# Patient Record
Sex: MALE | Race: WHITE | NOT HISPANIC OR LATINO | Employment: OTHER | URBAN - METROPOLITAN AREA
[De-identification: names, ages, dates, MRNs, and addresses within clinical notes are randomized per-mention and may not be internally consistent; named-entity substitution may affect disease eponyms.]

---

## 2021-11-20 LAB
LEFT EYE DM RETINOPATHY: NEGATIVE
RIGHT EYE DM RETINOPATHY: NEGATIVE

## 2021-11-30 ENCOUNTER — TELEPHONE (OUTPATIENT)
Dept: INTERNAL MEDICINE | Facility: CLINIC | Age: 46
End: 2021-11-30
Payer: COMMERCIAL

## 2021-12-02 ENCOUNTER — HOSPITAL ENCOUNTER (OUTPATIENT)
Dept: RADIOLOGY | Facility: HOSPITAL | Age: 46
Discharge: HOME | End: 2021-12-02
Attending: FAMILY MEDICINE
Payer: COMMERCIAL

## 2021-12-02 ENCOUNTER — OFFICE VISIT (OUTPATIENT)
Dept: INTERNAL MEDICINE | Facility: CLINIC | Age: 46
End: 2021-12-02
Payer: COMMERCIAL

## 2021-12-02 VITALS
OXYGEN SATURATION: 98 % | HEART RATE: 78 BPM | DIASTOLIC BLOOD PRESSURE: 84 MMHG | BODY MASS INDEX: 38.09 KG/M2 | SYSTOLIC BLOOD PRESSURE: 120 MMHG | HEIGHT: 71 IN | WEIGHT: 272.06 LBS | TEMPERATURE: 98 F

## 2021-12-02 DIAGNOSIS — E34.9 TESTOSTERONE DEFICIENCY: ICD-10-CM

## 2021-12-02 DIAGNOSIS — R10.11 RUQ ABDOMINAL PAIN: ICD-10-CM

## 2021-12-02 DIAGNOSIS — G89.29 CHRONIC BILATERAL LOW BACK PAIN WITHOUT SCIATICA: ICD-10-CM

## 2021-12-02 DIAGNOSIS — E11.9 TYPE 2 DIABETES MELLITUS WITHOUT COMPLICATION, WITHOUT LONG-TERM CURRENT USE OF INSULIN: Primary | ICD-10-CM

## 2021-12-02 DIAGNOSIS — E78.5 HYPERLIPIDEMIA, UNSPECIFIED HYPERLIPIDEMIA TYPE: ICD-10-CM

## 2021-12-02 DIAGNOSIS — M54.50 CHRONIC BILATERAL LOW BACK PAIN WITHOUT SCIATICA: ICD-10-CM

## 2021-12-02 DIAGNOSIS — N50.89 TESTICULAR MASS: ICD-10-CM

## 2021-12-02 PROCEDURE — 74019 RADEX ABDOMEN 2 VIEWS: CPT | Mod: TC,PO

## 2021-12-02 PROCEDURE — 96372 THER/PROPH/DIAG INJ SC/IM: CPT | Mod: S$GLB,,, | Performed by: FAMILY MEDICINE

## 2021-12-02 PROCEDURE — 99204 PR OFFICE/OUTPT VISIT, NEW, LEVL IV, 45-59 MIN: ICD-10-PCS | Mod: 25,S$GLB,, | Performed by: FAMILY MEDICINE

## 2021-12-02 PROCEDURE — 99204 OFFICE O/P NEW MOD 45 MIN: CPT | Mod: 25,S$GLB,, | Performed by: FAMILY MEDICINE

## 2021-12-02 PROCEDURE — 99999 PR PBB SHADOW E&M-EST. PATIENT-LVL III: ICD-10-PCS | Mod: PBBFAC,,, | Performed by: FAMILY MEDICINE

## 2021-12-02 PROCEDURE — 72110 X-RAY EXAM L-2 SPINE 4/>VWS: CPT | Mod: TC,PO

## 2021-12-02 PROCEDURE — 74019 XR ABDOMEN FLAT AND ERECT: ICD-10-PCS | Mod: 26,,, | Performed by: RADIOLOGY

## 2021-12-02 PROCEDURE — 74019 RADEX ABDOMEN 2 VIEWS: CPT | Mod: 26,,, | Performed by: RADIOLOGY

## 2021-12-02 PROCEDURE — 96372 PR INJECTION,THERAP/PROPH/DIAG2ST, IM OR SUBCUT: ICD-10-PCS | Mod: S$GLB,,, | Performed by: FAMILY MEDICINE

## 2021-12-02 PROCEDURE — 99999 PR PBB SHADOW E&M-EST. PATIENT-LVL III: CPT | Mod: PBBFAC,,, | Performed by: FAMILY MEDICINE

## 2021-12-02 PROCEDURE — 72110 X-RAY EXAM L-2 SPINE 4/>VWS: CPT | Mod: 26,,, | Performed by: RADIOLOGY

## 2021-12-02 PROCEDURE — 72110 XR LUMBAR SPINE COMPLETE 5 VIEW: ICD-10-PCS | Mod: 26,,, | Performed by: RADIOLOGY

## 2021-12-02 RX ORDER — METFORMIN HYDROCHLORIDE 500 MG/1
500 TABLET ORAL 2 TIMES DAILY WITH MEALS
Qty: 180 TABLET | Refills: 3 | Status: SHIPPED | OUTPATIENT
Start: 2021-12-02 | End: 2022-03-01 | Stop reason: SDUPTHER

## 2021-12-02 RX ORDER — TESTOSTERONE CYPIONATE 200 MG/ML
200 INJECTION, SOLUTION INTRAMUSCULAR
Status: DISCONTINUED | OUTPATIENT
Start: 2021-12-02 | End: 2022-01-21

## 2021-12-02 RX ADMIN — TESTOSTERONE CYPIONATE 200 MG: 200 INJECTION, SOLUTION INTRAMUSCULAR at 11:12

## 2021-12-03 ENCOUNTER — TELEPHONE (OUTPATIENT)
Dept: INTERNAL MEDICINE | Facility: CLINIC | Age: 46
End: 2021-12-03
Payer: COMMERCIAL

## 2021-12-03 RX ORDER — ROSUVASTATIN CALCIUM 20 MG/1
20 TABLET, COATED ORAL DAILY
Qty: 90 TABLET | Refills: 3 | Status: SHIPPED | OUTPATIENT
Start: 2021-12-03 | End: 2022-06-07 | Stop reason: SDUPTHER

## 2021-12-03 RX ORDER — MELOXICAM 15 MG/1
15 TABLET ORAL DAILY
Qty: 30 TABLET | Refills: 1 | Status: SHIPPED | OUTPATIENT
Start: 2021-12-03 | End: 2022-02-03

## 2021-12-05 PROBLEM — G89.29 CHRONIC BILATERAL LOW BACK PAIN WITHOUT SCIATICA: Status: ACTIVE | Noted: 2021-12-05

## 2021-12-05 PROBLEM — M54.50 CHRONIC BILATERAL LOW BACK PAIN WITHOUT SCIATICA: Status: ACTIVE | Noted: 2021-12-05

## 2021-12-05 PROBLEM — N50.89 TESTICULAR MASS: Status: ACTIVE | Noted: 2021-12-05

## 2021-12-06 ENCOUNTER — TELEPHONE (OUTPATIENT)
Dept: INTERNAL MEDICINE | Facility: CLINIC | Age: 46
End: 2021-12-06
Payer: COMMERCIAL

## 2021-12-13 ENCOUNTER — TELEPHONE (OUTPATIENT)
Dept: INTERNAL MEDICINE | Facility: CLINIC | Age: 46
End: 2021-12-13
Payer: COMMERCIAL

## 2021-12-13 DIAGNOSIS — N50.89 TESTICULAR MASS: Primary | ICD-10-CM

## 2021-12-14 ENCOUNTER — TELEPHONE (OUTPATIENT)
Dept: INTERNAL MEDICINE | Facility: CLINIC | Age: 46
End: 2021-12-14
Payer: COMMERCIAL

## 2021-12-16 ENCOUNTER — TELEPHONE (OUTPATIENT)
Dept: INTERNAL MEDICINE | Facility: CLINIC | Age: 46
End: 2021-12-16
Payer: COMMERCIAL

## 2021-12-23 ENCOUNTER — TELEPHONE (OUTPATIENT)
Dept: INTERNAL MEDICINE | Facility: CLINIC | Age: 46
End: 2021-12-23
Payer: COMMERCIAL

## 2021-12-23 ENCOUNTER — PATIENT MESSAGE (OUTPATIENT)
Dept: INTERNAL MEDICINE | Facility: CLINIC | Age: 46
End: 2021-12-23
Payer: COMMERCIAL

## 2021-12-28 ENCOUNTER — OFFICE VISIT (OUTPATIENT)
Dept: UROLOGY | Facility: CLINIC | Age: 46
End: 2021-12-28
Payer: COMMERCIAL

## 2021-12-28 VITALS
SYSTOLIC BLOOD PRESSURE: 121 MMHG | HEIGHT: 71 IN | TEMPERATURE: 98 F | WEIGHT: 270.63 LBS | DIASTOLIC BLOOD PRESSURE: 81 MMHG | HEART RATE: 82 BPM | BODY MASS INDEX: 37.89 KG/M2

## 2021-12-28 DIAGNOSIS — E34.9 TESTOSTERONE DEFICIENCY: Primary | ICD-10-CM

## 2021-12-28 DIAGNOSIS — N50.89 TESTICULAR MASS: ICD-10-CM

## 2021-12-28 PROCEDURE — 3046F HEMOGLOBIN A1C LEVEL >9.0%: CPT | Mod: CPTII,S$GLB,, | Performed by: NURSE PRACTITIONER

## 2021-12-28 PROCEDURE — 3008F PR BODY MASS INDEX (BMI) DOCUMENTED: ICD-10-PCS | Mod: CPTII,S$GLB,, | Performed by: NURSE PRACTITIONER

## 2021-12-28 PROCEDURE — 3008F BODY MASS INDEX DOCD: CPT | Mod: CPTII,S$GLB,, | Performed by: NURSE PRACTITIONER

## 2021-12-28 PROCEDURE — 1159F PR MEDICATION LIST DOCUMENTED IN MEDICAL RECORD: ICD-10-PCS | Mod: CPTII,S$GLB,, | Performed by: NURSE PRACTITIONER

## 2021-12-28 PROCEDURE — 3079F DIAST BP 80-89 MM HG: CPT | Mod: CPTII,S$GLB,, | Performed by: NURSE PRACTITIONER

## 2021-12-28 PROCEDURE — 3046F PR MOST RECENT HEMOGLOBIN A1C LEVEL > 9.0%: ICD-10-PCS | Mod: CPTII,S$GLB,, | Performed by: NURSE PRACTITIONER

## 2021-12-28 PROCEDURE — 99204 OFFICE O/P NEW MOD 45 MIN: CPT | Mod: 25,S$GLB,, | Performed by: NURSE PRACTITIONER

## 2021-12-28 PROCEDURE — 1159F MED LIST DOCD IN RCRD: CPT | Mod: CPTII,S$GLB,, | Performed by: NURSE PRACTITIONER

## 2021-12-28 PROCEDURE — 99204 PR OFFICE/OUTPT VISIT, NEW, LEVL IV, 45-59 MIN: ICD-10-PCS | Mod: 25,S$GLB,, | Performed by: NURSE PRACTITIONER

## 2021-12-28 PROCEDURE — 99999 PR PBB SHADOW E&M-EST. PATIENT-LVL III: CPT | Mod: PBBFAC,,, | Performed by: NURSE PRACTITIONER

## 2021-12-28 PROCEDURE — 99999 PR PBB SHADOW E&M-EST. PATIENT-LVL III: ICD-10-PCS | Mod: PBBFAC,,, | Performed by: NURSE PRACTITIONER

## 2021-12-28 PROCEDURE — 3074F PR MOST RECENT SYSTOLIC BLOOD PRESSURE < 130 MM HG: ICD-10-PCS | Mod: CPTII,S$GLB,, | Performed by: NURSE PRACTITIONER

## 2021-12-28 PROCEDURE — 96372 THER/PROPH/DIAG INJ SC/IM: CPT | Mod: S$GLB,,, | Performed by: NURSE PRACTITIONER

## 2021-12-28 PROCEDURE — 96372 PR INJECTION,THERAP/PROPH/DIAG2ST, IM OR SUBCUT: ICD-10-PCS | Mod: S$GLB,,, | Performed by: NURSE PRACTITIONER

## 2021-12-28 PROCEDURE — 3079F PR MOST RECENT DIASTOLIC BLOOD PRESSURE 80-89 MM HG: ICD-10-PCS | Mod: CPTII,S$GLB,, | Performed by: NURSE PRACTITIONER

## 2021-12-28 PROCEDURE — 3074F SYST BP LT 130 MM HG: CPT | Mod: CPTII,S$GLB,, | Performed by: NURSE PRACTITIONER

## 2021-12-28 RX ORDER — TESTOSTERONE CYPIONATE 200 MG/ML
200 INJECTION, SOLUTION INTRAMUSCULAR
Status: COMPLETED | OUTPATIENT
Start: 2021-12-28 | End: 2021-12-28

## 2021-12-28 RX ADMIN — TESTOSTERONE CYPIONATE 200 MG: 200 INJECTION, SOLUTION INTRAMUSCULAR at 04:12

## 2022-01-06 ENCOUNTER — HOSPITAL ENCOUNTER (OUTPATIENT)
Dept: RADIOLOGY | Facility: HOSPITAL | Age: 47
Discharge: HOME | End: 2022-01-06
Attending: NURSE PRACTITIONER
Payer: COMMERCIAL

## 2022-01-06 DIAGNOSIS — N50.89 TESTICULAR MASS: ICD-10-CM

## 2022-01-06 PROCEDURE — 76870 US EXAM SCROTUM: CPT | Mod: 26,,, | Performed by: RADIOLOGY

## 2022-01-06 PROCEDURE — 76870 US SCROTUM AND TESTICLES: ICD-10-PCS | Mod: 26,,, | Performed by: RADIOLOGY

## 2022-01-06 PROCEDURE — 76870 US EXAM SCROTUM: CPT | Mod: TC

## 2022-01-07 DIAGNOSIS — N50.89 SCROTAL MASS: Primary | ICD-10-CM

## 2022-01-18 ENCOUNTER — PATIENT MESSAGE (OUTPATIENT)
Dept: INTERNAL MEDICINE | Facility: CLINIC | Age: 47
End: 2022-01-18
Payer: COMMERCIAL

## 2022-01-21 ENCOUNTER — TELEPHONE (OUTPATIENT)
Dept: INTERNAL MEDICINE | Facility: CLINIC | Age: 47
End: 2022-01-21

## 2022-01-21 ENCOUNTER — PATIENT MESSAGE (OUTPATIENT)
Dept: INTERNAL MEDICINE | Facility: CLINIC | Age: 47
End: 2022-01-21

## 2022-01-21 ENCOUNTER — CLINICAL SUPPORT (OUTPATIENT)
Dept: INTERNAL MEDICINE | Facility: CLINIC | Age: 47
End: 2022-01-21
Payer: COMMERCIAL

## 2022-01-21 DIAGNOSIS — E34.9 TESTOSTERONE DEFICIENCY: Primary | ICD-10-CM

## 2022-01-21 PROCEDURE — 99999 PR PBB SHADOW E&M-EST. PATIENT-LVL I: ICD-10-PCS | Mod: PBBFAC,,,

## 2022-01-21 PROCEDURE — 96372 THER/PROPH/DIAG INJ SC/IM: CPT | Mod: S$GLB,,, | Performed by: FAMILY MEDICINE

## 2022-01-21 PROCEDURE — 99999 PR PBB SHADOW E&M-EST. PATIENT-LVL I: CPT | Mod: PBBFAC,,,

## 2022-01-21 PROCEDURE — 96372 PR INJECTION,THERAP/PROPH/DIAG2ST, IM OR SUBCUT: ICD-10-PCS | Mod: S$GLB,,, | Performed by: FAMILY MEDICINE

## 2022-01-21 RX ORDER — TESTOSTERONE CYPIONATE 200 MG/ML
200 INJECTION, SOLUTION INTRAMUSCULAR
Status: DISCONTINUED | OUTPATIENT
Start: 2022-01-21 | End: 2022-02-28

## 2022-01-21 RX ORDER — TESTOSTERONE CYPIONATE 200 MG/ML
50 INJECTION, SOLUTION INTRAMUSCULAR
Status: CANCELLED | OUTPATIENT
Start: 2022-01-21 | End: 2022-01-21

## 2022-01-21 RX ADMIN — TESTOSTERONE CYPIONATE 200 MG: 200 INJECTION, SOLUTION INTRAMUSCULAR at 03:01

## 2022-01-21 NOTE — PROGRESS NOTES
Testosterone 200 mg administered via IM injection to right  ventrogluteal. Pt tolerated well. Pt advised to wait 15 mins for observation of adverse reaction. No adverse reaction noted.

## 2022-02-01 ENCOUNTER — PATIENT MESSAGE (OUTPATIENT)
Dept: INTERNAL MEDICINE | Facility: CLINIC | Age: 47
End: 2022-02-01
Payer: COMMERCIAL

## 2022-02-03 RX ORDER — MELOXICAM 15 MG/1
TABLET ORAL
Qty: 30 TABLET | Refills: 1 | Status: SHIPPED | OUTPATIENT
Start: 2022-02-03 | End: 2022-03-09 | Stop reason: SDUPTHER

## 2022-02-17 ENCOUNTER — CLINICAL SUPPORT (OUTPATIENT)
Dept: INTERNAL MEDICINE | Facility: CLINIC | Age: 47
End: 2022-02-17
Payer: COMMERCIAL

## 2022-02-17 ENCOUNTER — LAB VISIT (OUTPATIENT)
Dept: LAB | Facility: HOSPITAL | Age: 47
End: 2022-02-17
Attending: FAMILY MEDICINE
Payer: COMMERCIAL

## 2022-02-17 DIAGNOSIS — E34.9 TESTOSTERONE DEFICIENCY: ICD-10-CM

## 2022-02-17 DIAGNOSIS — E34.9 TESTOSTERONE DEFICIENCY: Primary | ICD-10-CM

## 2022-02-17 PROCEDURE — 99999 PR PBB SHADOW E&M-EST. PATIENT-LVL II: ICD-10-PCS | Mod: PBBFAC,,,

## 2022-02-17 PROCEDURE — 82040 ASSAY OF SERUM ALBUMIN: CPT | Performed by: FAMILY MEDICINE

## 2022-02-17 PROCEDURE — 36415 COLL VENOUS BLD VENIPUNCTURE: CPT | Mod: PO | Performed by: FAMILY MEDICINE

## 2022-02-17 PROCEDURE — 96372 PR INJECTION,THERAP/PROPH/DIAG2ST, IM OR SUBCUT: ICD-10-PCS | Mod: S$GLB,,, | Performed by: FAMILY MEDICINE

## 2022-02-17 PROCEDURE — 99999 PR PBB SHADOW E&M-EST. PATIENT-LVL II: CPT | Mod: PBBFAC,,,

## 2022-02-17 PROCEDURE — 96372 THER/PROPH/DIAG INJ SC/IM: CPT | Mod: S$GLB,,, | Performed by: FAMILY MEDICINE

## 2022-02-17 RX ADMIN — TESTOSTERONE CYPIONATE 200 MG: 200 INJECTION, SOLUTION INTRAMUSCULAR at 01:02

## 2022-02-17 NOTE — PROGRESS NOTES
Testosterone 200 mg administered via IM injection to left  ventrogluteal. Pt tolerated well. Pt advised to wait 15 mins for observation of adverse reaction. No adverse reaction noted.

## 2022-02-23 ENCOUNTER — PATIENT MESSAGE (OUTPATIENT)
Dept: INTERNAL MEDICINE | Facility: CLINIC | Age: 47
End: 2022-02-23
Payer: COMMERCIAL

## 2022-02-23 LAB
ALBUMIN SERPL-MCNC: 4.5 G/DL (ref 3.6–5.1)
SHBG SERPL-SCNC: 17 NMOL/L (ref 10–50)
TESTOST FREE SERPL-MCNC: 27.3 PG/ML (ref 46–224)
TESTOST SERPL-MCNC: 141 NG/DL (ref 250–1100)
TESTOSTERONE.FREE+WB SERPL-MCNC: 56.2 NG/DL (ref 110–575)

## 2022-02-23 RX ORDER — TESTOSTERONE CYPIONATE 200 MG/ML
400 INJECTION, SOLUTION INTRAMUSCULAR
Status: DISCONTINUED | OUTPATIENT
Start: 2022-02-24 | End: 2022-02-28

## 2022-02-24 ENCOUNTER — TELEPHONE (OUTPATIENT)
Dept: INTERNAL MEDICINE | Facility: CLINIC | Age: 47
End: 2022-02-24
Payer: COMMERCIAL

## 2022-02-24 ENCOUNTER — PATIENT MESSAGE (OUTPATIENT)
Dept: INTERNAL MEDICINE | Facility: CLINIC | Age: 47
End: 2022-02-24
Payer: COMMERCIAL

## 2022-02-24 NOTE — TELEPHONE ENCOUNTER
Patient would like to have two injections monthly instead of one . He is requesting testosterone 200 mg every 2 weeks instead of testosterone 400 mg q 28 days and wants to know if he can have one next week .   Please advise

## 2022-02-28 ENCOUNTER — OFFICE VISIT (OUTPATIENT)
Dept: INTERNAL MEDICINE | Facility: CLINIC | Age: 47
End: 2022-02-28
Payer: COMMERCIAL

## 2022-02-28 ENCOUNTER — LAB VISIT (OUTPATIENT)
Dept: LAB | Facility: HOSPITAL | Age: 47
End: 2022-02-28
Attending: FAMILY MEDICINE
Payer: COMMERCIAL

## 2022-02-28 VITALS
HEIGHT: 71 IN | TEMPERATURE: 98 F | HEART RATE: 73 BPM | DIASTOLIC BLOOD PRESSURE: 76 MMHG | OXYGEN SATURATION: 98 % | BODY MASS INDEX: 38.82 KG/M2 | WEIGHT: 277.31 LBS | SYSTOLIC BLOOD PRESSURE: 119 MMHG

## 2022-02-28 DIAGNOSIS — E11.9 TYPE 2 DIABETES MELLITUS WITHOUT COMPLICATION, WITHOUT LONG-TERM CURRENT USE OF INSULIN: ICD-10-CM

## 2022-02-28 DIAGNOSIS — H93.A9 PULSATILE TINNITUS: ICD-10-CM

## 2022-02-28 DIAGNOSIS — E34.9 TESTOSTERONE DEFICIENCY: Primary | ICD-10-CM

## 2022-02-28 LAB
ALBUMIN SERPL BCP-MCNC: 4.1 G/DL (ref 3.5–5.2)
ALP SERPL-CCNC: 62 U/L (ref 55–135)
ALT SERPL W/O P-5'-P-CCNC: 25 U/L (ref 10–44)
ANION GAP SERPL CALC-SCNC: 11 MMOL/L (ref 8–16)
AST SERPL-CCNC: 17 U/L (ref 10–40)
BILIRUB SERPL-MCNC: 0.5 MG/DL (ref 0.1–1)
BUN SERPL-MCNC: 12 MG/DL (ref 6–20)
CALCIUM SERPL-MCNC: 9.5 MG/DL (ref 8.7–10.5)
CHLORIDE SERPL-SCNC: 96 MMOL/L (ref 95–110)
CHOLEST SERPL-MCNC: 162 MG/DL (ref 120–199)
CHOLEST/HDLC SERPL: 4.8 {RATIO} (ref 2–5)
CO2 SERPL-SCNC: 27 MMOL/L (ref 23–29)
CREAT SERPL-MCNC: 0.9 MG/DL (ref 0.5–1.4)
EST. GFR  (AFRICAN AMERICAN): >60 ML/MIN/1.73 M^2
EST. GFR  (NON AFRICAN AMERICAN): >60 ML/MIN/1.73 M^2
ESTIMATED AVG GLUCOSE: 275 MG/DL (ref 68–131)
GLUCOSE SERPL-MCNC: 200 MG/DL (ref 70–110)
HBA1C MFR BLD: 11.2 % (ref 4–5.6)
HDLC SERPL-MCNC: 34 MG/DL (ref 40–75)
HDLC SERPL: 21 % (ref 20–50)
LDLC SERPL CALC-MCNC: 76 MG/DL (ref 63–159)
NONHDLC SERPL-MCNC: 128 MG/DL
POTASSIUM SERPL-SCNC: 4.1 MMOL/L (ref 3.5–5.1)
PROT SERPL-MCNC: 6.8 G/DL (ref 6–8.4)
SODIUM SERPL-SCNC: 134 MMOL/L (ref 136–145)
TRIGL SERPL-MCNC: 260 MG/DL (ref 30–150)

## 2022-02-28 PROCEDURE — 3074F SYST BP LT 130 MM HG: CPT | Mod: CPTII,S$GLB,, | Performed by: FAMILY MEDICINE

## 2022-02-28 PROCEDURE — 96372 PR INJECTION,THERAP/PROPH/DIAG2ST, IM OR SUBCUT: ICD-10-PCS | Mod: S$GLB,,, | Performed by: FAMILY MEDICINE

## 2022-02-28 PROCEDURE — 99999 PR PBB SHADOW E&M-EST. PATIENT-LVL III: ICD-10-PCS | Mod: PBBFAC,,, | Performed by: FAMILY MEDICINE

## 2022-02-28 PROCEDURE — 1159F MED LIST DOCD IN RCRD: CPT | Mod: CPTII,S$GLB,, | Performed by: FAMILY MEDICINE

## 2022-02-28 PROCEDURE — 96372 THER/PROPH/DIAG INJ SC/IM: CPT | Mod: S$GLB,,, | Performed by: FAMILY MEDICINE

## 2022-02-28 PROCEDURE — 83036 HEMOGLOBIN GLYCOSYLATED A1C: CPT | Performed by: FAMILY MEDICINE

## 2022-02-28 PROCEDURE — 3008F PR BODY MASS INDEX (BMI) DOCUMENTED: ICD-10-PCS | Mod: CPTII,S$GLB,, | Performed by: FAMILY MEDICINE

## 2022-02-28 PROCEDURE — 1159F PR MEDICATION LIST DOCUMENTED IN MEDICAL RECORD: ICD-10-PCS | Mod: CPTII,S$GLB,, | Performed by: FAMILY MEDICINE

## 2022-02-28 PROCEDURE — 3078F DIAST BP <80 MM HG: CPT | Mod: CPTII,S$GLB,, | Performed by: FAMILY MEDICINE

## 2022-02-28 PROCEDURE — 3008F BODY MASS INDEX DOCD: CPT | Mod: CPTII,S$GLB,, | Performed by: FAMILY MEDICINE

## 2022-02-28 PROCEDURE — 36415 COLL VENOUS BLD VENIPUNCTURE: CPT | Mod: PO | Performed by: FAMILY MEDICINE

## 2022-02-28 PROCEDURE — 99213 PR OFFICE/OUTPT VISIT, EST, LEVL III, 20-29 MIN: ICD-10-PCS | Mod: 25,S$GLB,, | Performed by: FAMILY MEDICINE

## 2022-02-28 PROCEDURE — 80061 LIPID PANEL: CPT | Performed by: FAMILY MEDICINE

## 2022-02-28 PROCEDURE — 99213 OFFICE O/P EST LOW 20 MIN: CPT | Mod: 25,S$GLB,, | Performed by: FAMILY MEDICINE

## 2022-02-28 PROCEDURE — 3046F PR MOST RECENT HEMOGLOBIN A1C LEVEL > 9.0%: ICD-10-PCS | Mod: CPTII,S$GLB,, | Performed by: FAMILY MEDICINE

## 2022-02-28 PROCEDURE — 3046F HEMOGLOBIN A1C LEVEL >9.0%: CPT | Mod: CPTII,S$GLB,, | Performed by: FAMILY MEDICINE

## 2022-02-28 PROCEDURE — 80053 COMPREHEN METABOLIC PANEL: CPT | Performed by: FAMILY MEDICINE

## 2022-02-28 PROCEDURE — 3074F PR MOST RECENT SYSTOLIC BLOOD PRESSURE < 130 MM HG: ICD-10-PCS | Mod: CPTII,S$GLB,, | Performed by: FAMILY MEDICINE

## 2022-02-28 PROCEDURE — 3078F PR MOST RECENT DIASTOLIC BLOOD PRESSURE < 80 MM HG: ICD-10-PCS | Mod: CPTII,S$GLB,, | Performed by: FAMILY MEDICINE

## 2022-02-28 PROCEDURE — 99999 PR PBB SHADOW E&M-EST. PATIENT-LVL III: CPT | Mod: PBBFAC,,, | Performed by: FAMILY MEDICINE

## 2022-02-28 RX ORDER — TESTOSTERONE CYPIONATE 200 MG/ML
200 INJECTION, SOLUTION INTRAMUSCULAR
Status: DISCONTINUED | OUTPATIENT
Start: 2022-02-28 | End: 2022-05-31

## 2022-02-28 RX ADMIN — TESTOSTERONE CYPIONATE 200 MG: 200 INJECTION, SOLUTION INTRAMUSCULAR at 02:02

## 2022-02-28 NOTE — PROGRESS NOTES
Administered testosterone  200 mg      into  lvg    . Patient tolerated well ,suggested 15 min wait .

## 2022-03-01 RX ORDER — METFORMIN HYDROCHLORIDE 1000 MG/1
1000 TABLET ORAL 2 TIMES DAILY WITH MEALS
Qty: 180 TABLET | Refills: 3 | Status: SHIPPED | OUTPATIENT
Start: 2022-03-01 | End: 2022-06-16

## 2022-03-01 NOTE — PROGRESS NOTES
"Subjective:      Patient ID: Kendrick Nava is a 46 y.o. male.    Chief Complaint: Follow-up      Patient here to discuss testosterone replacement therapy, reports he is feeling tired and fatigued between injections, felt better overall when he was getting injections q.2 weeks.  Also reports often hearing pulse in his ears, unable to localize which side is worse, several nights ago was keeping him awake and had difficulty sleeping because it was so loud.  Denies any congestion, pressure or pain in the ears    Follow-up  Associated symptoms include fatigue. Pertinent negatives include no congestion.     Review of Systems   Constitutional: Positive for fatigue.   HENT: Negative for congestion, ear discharge and ear pain.      Past Medical History:   Diagnosis Date    Diabetes mellitus, type 2     Hyperlipidemia     Testosterone deficiency     LOW Testosterone          History reviewed. No pertinent surgical history.  Family History   Problem Relation Age of Onset    Diabetes type II Mother     Diabetes type II Father     Hypertension Father     Diabetes type II Brother     Cancer Maternal Grandmother     Diabetes Maternal Grandfather     No Known Problems Paternal Grandmother     Heart attack Paternal Grandfather      Social History     Socioeconomic History    Marital status:    Tobacco Use    Smoking status: Current Some Day Smoker     Types: Vaping with nicotine    Smokeless tobacco: Never Used   Substance and Sexual Activity    Alcohol use: Yes     Alcohol/week: 1.0 standard drink     Types: 1 Shots of liquor per week     Comment: once a month    Drug use: Never    Sexual activity: Yes     Partners: Male     Review of patient's allergies indicates:   Allergen Reactions    Lipitor [atorvastatin]      depression       Objective:       /76 (BP Location: Right arm, Patient Position: Sitting, BP Method: Large (Automatic))   Pulse 73   Temp 98.4 °F (36.9 °C) (Tympanic)   Ht 5' 11" " (1.803 m)   Wt 125.8 kg (277 lb 5.4 oz)   SpO2 98%   BMI 38.68 kg/m²   Physical Exam  Constitutional:       General: He is not in acute distress.     Appearance: Normal appearance. He is well-developed. He is not ill-appearing or diaphoretic.   HENT:      Right Ear: Tympanic membrane, ear canal and external ear normal. There is no impacted cerumen.      Left Ear: Tympanic membrane, ear canal and external ear normal. There is no impacted cerumen.      Mouth/Throat:      Mouth: Mucous membranes are moist.      Pharynx: No posterior oropharyngeal erythema.   Cardiovascular:      Rate and Rhythm: Normal rate and regular rhythm.      Heart sounds: Normal heart sounds.   Pulmonary:      Effort: Pulmonary effort is normal.      Breath sounds: Normal breath sounds.   Neurological:      General: No focal deficit present.      Mental Status: He is alert and oriented to person, place, and time.   Psychiatric:         Mood and Affect: Mood normal.         Behavior: Behavior normal.         Thought Content: Thought content normal.         Judgment: Judgment normal.       Assessment:     1. Testosterone deficiency    2. Pulsatile tinnitus      Plan:   Testosterone deficiency    Pulsatile tinnitus    Other orders  -     testosterone cypionate injection 200 mg    Will increase his testosterone replacement to 200 mg q.2 weeks  He needs to have diabetes labs drawn today  Recommended patient try using decongestant or nasal spray, if pulse sound worsens or becomes more frequent may consider ENT referral  Medication List with Changes/Refills   Current Medications    MELOXICAM (MOBIC) 15 MG TABLET    TAKE 1 TABLET(15 MG) BY MOUTH EVERY DAY WITH MEAL    METFORMIN (GLUCOPHAGE) 500 MG TABLET    Take 1 tablet (500 mg total) by mouth 2 (two) times daily with meals.    ROSUVASTATIN (CRESTOR) 20 MG TABLET    Take 1 tablet (20 mg total) by mouth once daily.

## 2022-03-10 ENCOUNTER — PATIENT MESSAGE (OUTPATIENT)
Dept: INTERNAL MEDICINE | Facility: CLINIC | Age: 47
End: 2022-03-10
Payer: COMMERCIAL

## 2022-03-14 ENCOUNTER — CLINICAL SUPPORT (OUTPATIENT)
Dept: INTERNAL MEDICINE | Facility: CLINIC | Age: 47
End: 2022-03-14
Payer: COMMERCIAL

## 2022-03-14 DIAGNOSIS — E34.9 TESTOSTERONE DEFICIENCY: Primary | ICD-10-CM

## 2022-03-14 PROCEDURE — 96372 THER/PROPH/DIAG INJ SC/IM: CPT | Mod: S$GLB,,, | Performed by: FAMILY MEDICINE

## 2022-03-14 PROCEDURE — 96372 PR INJECTION,THERAP/PROPH/DIAG2ST, IM OR SUBCUT: ICD-10-PCS | Mod: S$GLB,,, | Performed by: FAMILY MEDICINE

## 2022-03-14 PROCEDURE — 99999 PR PBB SHADOW E&M-EST. PATIENT-LVL II: ICD-10-PCS | Mod: PBBFAC,,,

## 2022-03-14 PROCEDURE — 99999 PR PBB SHADOW E&M-EST. PATIENT-LVL II: CPT | Mod: PBBFAC,,,

## 2022-03-14 RX ADMIN — TESTOSTERONE CYPIONATE 200 MG: 200 INJECTION, SOLUTION INTRAMUSCULAR at 02:03

## 2022-03-25 ENCOUNTER — PATIENT MESSAGE (OUTPATIENT)
Dept: INTERNAL MEDICINE | Facility: CLINIC | Age: 47
End: 2022-03-25
Payer: COMMERCIAL

## 2022-03-28 ENCOUNTER — CLINICAL SUPPORT (OUTPATIENT)
Dept: INTERNAL MEDICINE | Facility: CLINIC | Age: 47
End: 2022-03-28
Payer: COMMERCIAL

## 2022-03-28 DIAGNOSIS — E34.9 TESTOSTERONE DEFICIENCY: Primary | ICD-10-CM

## 2022-03-28 PROCEDURE — 96372 THER/PROPH/DIAG INJ SC/IM: CPT | Mod: S$GLB,,, | Performed by: FAMILY MEDICINE

## 2022-03-28 PROCEDURE — 99999 PR PBB SHADOW E&M-EST. PATIENT-LVL II: CPT | Mod: PBBFAC,,,

## 2022-03-28 PROCEDURE — 96372 PR INJECTION,THERAP/PROPH/DIAG2ST, IM OR SUBCUT: ICD-10-PCS | Mod: S$GLB,,, | Performed by: FAMILY MEDICINE

## 2022-03-28 PROCEDURE — 99999 PR PBB SHADOW E&M-EST. PATIENT-LVL II: ICD-10-PCS | Mod: PBBFAC,,,

## 2022-03-28 RX ADMIN — TESTOSTERONE CYPIONATE 200 MG: 200 INJECTION, SOLUTION INTRAMUSCULAR at 09:03

## 2022-04-18 ENCOUNTER — CLINICAL SUPPORT (OUTPATIENT)
Dept: INTERNAL MEDICINE | Facility: CLINIC | Age: 47
End: 2022-04-18
Payer: COMMERCIAL

## 2022-04-18 DIAGNOSIS — E34.9 TESTOSTERONE DEFICIENCY: Primary | ICD-10-CM

## 2022-04-18 PROCEDURE — 99999 PR PBB SHADOW E&M-EST. PATIENT-LVL II: CPT | Mod: PBBFAC,,,

## 2022-04-18 PROCEDURE — 96372 THER/PROPH/DIAG INJ SC/IM: CPT | Mod: S$GLB,,, | Performed by: FAMILY MEDICINE

## 2022-04-18 PROCEDURE — 96372 PR INJECTION,THERAP/PROPH/DIAG2ST, IM OR SUBCUT: ICD-10-PCS | Mod: S$GLB,,, | Performed by: FAMILY MEDICINE

## 2022-04-18 PROCEDURE — 99999 PR PBB SHADOW E&M-EST. PATIENT-LVL II: ICD-10-PCS | Mod: PBBFAC,,,

## 2022-04-18 RX ORDER — TESTOSTERONE CYPIONATE 200 MG/ML
200 INJECTION, SOLUTION INTRAMUSCULAR
Status: DISCONTINUED | OUTPATIENT
Start: 2022-04-18 | End: 2022-08-16

## 2022-04-18 RX ADMIN — TESTOSTERONE CYPIONATE 200 MG: 200 INJECTION, SOLUTION INTRAMUSCULAR at 03:04

## 2022-05-02 ENCOUNTER — CLINICAL SUPPORT (OUTPATIENT)
Dept: INTERNAL MEDICINE | Facility: CLINIC | Age: 47
End: 2022-05-02
Payer: COMMERCIAL

## 2022-05-02 PROCEDURE — 96372 PR INJECTION,THERAP/PROPH/DIAG2ST, IM OR SUBCUT: ICD-10-PCS | Mod: S$GLB,,, | Performed by: FAMILY MEDICINE

## 2022-05-02 PROCEDURE — 99999 PR PBB SHADOW E&M-EST. PATIENT-LVL II: ICD-10-PCS | Mod: PBBFAC,,,

## 2022-05-02 PROCEDURE — 99999 PR PBB SHADOW E&M-EST. PATIENT-LVL II: CPT | Mod: PBBFAC,,,

## 2022-05-02 PROCEDURE — 96372 THER/PROPH/DIAG INJ SC/IM: CPT | Mod: S$GLB,,, | Performed by: FAMILY MEDICINE

## 2022-05-02 RX ADMIN — TESTOSTERONE CYPIONATE 200 MG: 200 INJECTION, SOLUTION INTRAMUSCULAR at 02:05

## 2022-05-16 ENCOUNTER — CLINICAL SUPPORT (OUTPATIENT)
Dept: INTERNAL MEDICINE | Facility: CLINIC | Age: 47
End: 2022-05-16
Payer: COMMERCIAL

## 2022-05-16 DIAGNOSIS — E34.9 TESTOSTERONE DEFICIENCY: Primary | ICD-10-CM

## 2022-05-16 PROCEDURE — 96372 THER/PROPH/DIAG INJ SC/IM: CPT | Mod: S$GLB,,, | Performed by: FAMILY MEDICINE

## 2022-05-16 PROCEDURE — 99999 PR PBB SHADOW E&M-EST. PATIENT-LVL II: ICD-10-PCS | Mod: PBBFAC,,,

## 2022-05-16 PROCEDURE — 99999 PR PBB SHADOW E&M-EST. PATIENT-LVL II: CPT | Mod: PBBFAC,,,

## 2022-05-16 PROCEDURE — 96372 PR INJECTION,THERAP/PROPH/DIAG2ST, IM OR SUBCUT: ICD-10-PCS | Mod: S$GLB,,, | Performed by: FAMILY MEDICINE

## 2022-05-16 RX ORDER — MELOXICAM 15 MG/1
TABLET ORAL
Qty: 30 TABLET | Refills: 1 | Status: SHIPPED | OUTPATIENT
Start: 2022-05-16 | End: 2022-06-16 | Stop reason: SDUPTHER

## 2022-05-16 RX ADMIN — TESTOSTERONE CYPIONATE 200 MG: 200 INJECTION, SOLUTION INTRAMUSCULAR at 01:05

## 2022-05-16 NOTE — TELEPHONE ENCOUNTER
Refill Routing Note   Medication(s) are not appropriate for processing by [facility] for the following reason(s):      - Outside of protocol  - Unclear if patient follows with you     [facility] action(s):  Route          Medication reconciliation completed: No     Appointments  past 12m or future 3m with PCP    Date Provider   Last Visit   2/28/2022 Genevieve Mclaughlin MD   Next Visit   6/13/2022 Genevieve Mclaughlin MD   ED visits in past 90 days: 0        Note composed:9:39 PM 05/15/2022

## 2022-05-31 ENCOUNTER — PATIENT MESSAGE (OUTPATIENT)
Dept: INTERNAL MEDICINE | Facility: CLINIC | Age: 47
End: 2022-05-31
Payer: COMMERCIAL

## 2022-05-31 RX ORDER — TESTOSTERONE CYPIONATE 200 MG/ML
200 INJECTION, SOLUTION INTRAMUSCULAR
Status: ACTIVE | OUTPATIENT
Start: 2022-06-06 | End: 2022-08-15

## 2022-06-02 ENCOUNTER — CLINICAL SUPPORT (OUTPATIENT)
Dept: INTERNAL MEDICINE | Facility: CLINIC | Age: 47
End: 2022-06-02
Payer: COMMERCIAL

## 2022-06-02 DIAGNOSIS — E34.9 TESTOSTERONE DEFICIENCY: Primary | ICD-10-CM

## 2022-06-02 PROCEDURE — 96372 PR INJECTION,THERAP/PROPH/DIAG2ST, IM OR SUBCUT: ICD-10-PCS | Mod: S$GLB,,, | Performed by: FAMILY MEDICINE

## 2022-06-02 PROCEDURE — 99999 PR PBB SHADOW E&M-EST. PATIENT-LVL I: CPT | Mod: PBBFAC,,,

## 2022-06-02 PROCEDURE — 96372 THER/PROPH/DIAG INJ SC/IM: CPT | Mod: S$GLB,,, | Performed by: FAMILY MEDICINE

## 2022-06-02 PROCEDURE — 99999 PR PBB SHADOW E&M-EST. PATIENT-LVL I: ICD-10-PCS | Mod: PBBFAC,,,

## 2022-06-02 RX ADMIN — TESTOSTERONE CYPIONATE 200 MG: 200 INJECTION, SOLUTION INTRAMUSCULAR at 02:06

## 2022-06-06 ENCOUNTER — LAB VISIT (OUTPATIENT)
Dept: LAB | Facility: HOSPITAL | Age: 47
End: 2022-06-06
Attending: FAMILY MEDICINE
Payer: COMMERCIAL

## 2022-06-06 DIAGNOSIS — E11.9 TYPE 2 DIABETES MELLITUS WITHOUT COMPLICATION, WITHOUT LONG-TERM CURRENT USE OF INSULIN: ICD-10-CM

## 2022-06-06 PROCEDURE — 80053 COMPREHEN METABOLIC PANEL: CPT | Performed by: FAMILY MEDICINE

## 2022-06-06 PROCEDURE — 83036 HEMOGLOBIN GLYCOSYLATED A1C: CPT | Performed by: FAMILY MEDICINE

## 2022-06-06 PROCEDURE — 36415 COLL VENOUS BLD VENIPUNCTURE: CPT | Mod: PO | Performed by: FAMILY MEDICINE

## 2022-06-07 LAB
ALBUMIN SERPL BCP-MCNC: 4.4 G/DL (ref 3.5–5.2)
ALP SERPL-CCNC: 56 U/L (ref 55–135)
ALT SERPL W/O P-5'-P-CCNC: 25 U/L (ref 10–44)
ANION GAP SERPL CALC-SCNC: 10 MMOL/L (ref 8–16)
AST SERPL-CCNC: 18 U/L (ref 10–40)
BILIRUB SERPL-MCNC: 0.6 MG/DL (ref 0.1–1)
BUN SERPL-MCNC: 12 MG/DL (ref 6–20)
CALCIUM SERPL-MCNC: 9 MG/DL (ref 8.7–10.5)
CHLORIDE SERPL-SCNC: 101 MMOL/L (ref 95–110)
CO2 SERPL-SCNC: 27 MMOL/L (ref 23–29)
CREAT SERPL-MCNC: 0.9 MG/DL (ref 0.5–1.4)
EST. GFR  (AFRICAN AMERICAN): >60 ML/MIN/1.73 M^2
EST. GFR  (NON AFRICAN AMERICAN): >60 ML/MIN/1.73 M^2
ESTIMATED AVG GLUCOSE: 237 MG/DL (ref 68–131)
GLUCOSE SERPL-MCNC: 192 MG/DL (ref 70–110)
HBA1C MFR BLD: 9.9 % (ref 4–5.6)
POTASSIUM SERPL-SCNC: 4.4 MMOL/L (ref 3.5–5.1)
PROT SERPL-MCNC: 6.9 G/DL (ref 6–8.4)
SODIUM SERPL-SCNC: 138 MMOL/L (ref 136–145)

## 2022-06-08 DIAGNOSIS — E11.9 TYPE 2 DIABETES MELLITUS WITHOUT COMPLICATION: ICD-10-CM

## 2022-06-13 ENCOUNTER — PATIENT OUTREACH (OUTPATIENT)
Dept: ADMINISTRATIVE | Facility: HOSPITAL | Age: 47
End: 2022-06-13
Payer: COMMERCIAL

## 2022-06-13 NOTE — LETTER
AUTHORIZATION FOR RELEASE OF   CONFIDENTIAL INFORMATION      We are seeing Kendrick Nava, date of birth 1975, in the clinic at [facility]. Genevieve Mclaughlin MD is the patient's PCP. Kendrick Nava has an outstanding lab/procedure at the time we reviewed his chart. In order to help keep his health information updated, he has authorized us to request the following medical record(s):        (  )  MAMMOGRAM                                      (  )  COLONOSCOPY      (  )  PAP SMEAR                                          (  )  OUTSIDE LAB RESULTS     (  )  DEXA SCAN                                          ( x )  EYE EXAM            (  )  FOOT EXAM                                          (  )  ENTIRE RECORD     (  )  OUTSIDE IMMUNIZATIONS                 (  )  _______________         Please fax records to [facility], Jeanenne C Brignac, MD, [phone]     If you have any questions, please contact    Halina VERDUGO [facility] at [phone]        Patient Name: Kendrick Nava  : 1975  Patient Phone #: [phone]

## 2022-06-13 NOTE — PROGRESS NOTES
Working eye exam report; Called patient who states that he had an eye exam at the end of 2021 @ [facility] [address] [place] [address]  - Will complete dm eye exam at next visit.     CAROL faxed to [address] to request eye exam records. Remind me set 1 week.

## 2022-06-16 ENCOUNTER — OFFICE VISIT (OUTPATIENT)
Dept: INTERNAL MEDICINE | Facility: CLINIC | Age: 47
End: 2022-06-16
Payer: COMMERCIAL

## 2022-06-16 ENCOUNTER — LAB VISIT (OUTPATIENT)
Dept: LAB | Facility: HOSPITAL | Age: 47
End: 2022-06-16
Attending: FAMILY MEDICINE
Payer: COMMERCIAL

## 2022-06-16 VITALS
TEMPERATURE: 98 F | HEIGHT: 71 IN | HEART RATE: 91 BPM | DIASTOLIC BLOOD PRESSURE: 64 MMHG | WEIGHT: 269.63 LBS | SYSTOLIC BLOOD PRESSURE: 129 MMHG | OXYGEN SATURATION: 96 % | BODY MASS INDEX: 37.75 KG/M2

## 2022-06-16 DIAGNOSIS — E34.9 TESTOSTERONE DEFICIENCY: ICD-10-CM

## 2022-06-16 DIAGNOSIS — G89.29 CHRONIC BILATERAL LOW BACK PAIN WITHOUT SCIATICA: ICD-10-CM

## 2022-06-16 DIAGNOSIS — E11.9 TYPE 2 DIABETES MELLITUS WITHOUT COMPLICATION, WITHOUT LONG-TERM CURRENT USE OF INSULIN: Primary | ICD-10-CM

## 2022-06-16 DIAGNOSIS — M54.50 CHRONIC BILATERAL LOW BACK PAIN WITHOUT SCIATICA: ICD-10-CM

## 2022-06-16 DIAGNOSIS — E11.9 TYPE 2 DIABETES MELLITUS WITHOUT COMPLICATION: ICD-10-CM

## 2022-06-16 PROCEDURE — 99214 PR OFFICE/OUTPT VISIT, EST, LEVL IV, 30-39 MIN: ICD-10-PCS | Mod: S$GLB,,, | Performed by: FAMILY MEDICINE

## 2022-06-16 PROCEDURE — 3008F PR BODY MASS INDEX (BMI) DOCUMENTED: ICD-10-PCS | Mod: CPTII,S$GLB,, | Performed by: FAMILY MEDICINE

## 2022-06-16 PROCEDURE — 3078F DIAST BP <80 MM HG: CPT | Mod: CPTII,S$GLB,, | Performed by: FAMILY MEDICINE

## 2022-06-16 PROCEDURE — 99999 PR PBB SHADOW E&M-EST. PATIENT-LVL IV: ICD-10-PCS | Mod: PBBFAC,,, | Performed by: FAMILY MEDICINE

## 2022-06-16 PROCEDURE — 1159F MED LIST DOCD IN RCRD: CPT | Mod: CPTII,S$GLB,, | Performed by: FAMILY MEDICINE

## 2022-06-16 PROCEDURE — 82043 UR ALBUMIN QUANTITATIVE: CPT | Performed by: FAMILY MEDICINE

## 2022-06-16 PROCEDURE — 3060F PR POS MICROALBUMINURIA RESULT DOCUMENTED/REVIEW: ICD-10-PCS | Mod: CPTII,S$GLB,, | Performed by: FAMILY MEDICINE

## 2022-06-16 PROCEDURE — 3008F BODY MASS INDEX DOCD: CPT | Mod: CPTII,S$GLB,, | Performed by: FAMILY MEDICINE

## 2022-06-16 PROCEDURE — 3066F PR DOCUMENTATION OF TREATMENT FOR NEPHROPATHY: ICD-10-PCS | Mod: CPTII,S$GLB,, | Performed by: FAMILY MEDICINE

## 2022-06-16 PROCEDURE — 1159F PR MEDICATION LIST DOCUMENTED IN MEDICAL RECORD: ICD-10-PCS | Mod: CPTII,S$GLB,, | Performed by: FAMILY MEDICINE

## 2022-06-16 PROCEDURE — 3078F PR MOST RECENT DIASTOLIC BLOOD PRESSURE < 80 MM HG: ICD-10-PCS | Mod: CPTII,S$GLB,, | Performed by: FAMILY MEDICINE

## 2022-06-16 PROCEDURE — 3060F POS MICROALBUMINURIA REV: CPT | Mod: CPTII,S$GLB,, | Performed by: FAMILY MEDICINE

## 2022-06-16 PROCEDURE — 82570 ASSAY OF URINE CREATININE: CPT | Performed by: FAMILY MEDICINE

## 2022-06-16 PROCEDURE — 3046F HEMOGLOBIN A1C LEVEL >9.0%: CPT | Mod: CPTII,S$GLB,, | Performed by: FAMILY MEDICINE

## 2022-06-16 PROCEDURE — 3074F PR MOST RECENT SYSTOLIC BLOOD PRESSURE < 130 MM HG: ICD-10-PCS | Mod: CPTII,S$GLB,, | Performed by: FAMILY MEDICINE

## 2022-06-16 PROCEDURE — 3074F SYST BP LT 130 MM HG: CPT | Mod: CPTII,S$GLB,, | Performed by: FAMILY MEDICINE

## 2022-06-16 PROCEDURE — 99214 OFFICE O/P EST MOD 30 MIN: CPT | Mod: S$GLB,,, | Performed by: FAMILY MEDICINE

## 2022-06-16 PROCEDURE — 99999 PR PBB SHADOW E&M-EST. PATIENT-LVL IV: CPT | Mod: PBBFAC,,, | Performed by: FAMILY MEDICINE

## 2022-06-16 PROCEDURE — 3046F PR MOST RECENT HEMOGLOBIN A1C LEVEL > 9.0%: ICD-10-PCS | Mod: CPTII,S$GLB,, | Performed by: FAMILY MEDICINE

## 2022-06-16 PROCEDURE — 3066F NEPHROPATHY DOC TX: CPT | Mod: CPTII,S$GLB,, | Performed by: FAMILY MEDICINE

## 2022-06-16 RX ORDER — METFORMIN HYDROCHLORIDE 750 MG/1
750 TABLET, EXTENDED RELEASE ORAL 2 TIMES DAILY WITH MEALS
Qty: 60 TABLET | Refills: 11 | Status: SHIPPED | OUTPATIENT
Start: 2022-06-16 | End: 2022-11-21 | Stop reason: SDUPTHER

## 2022-06-16 RX ORDER — EMPAGLIFLOZIN 25 MG/1
25 TABLET, FILM COATED ORAL DAILY
Qty: 30 TABLET | Refills: 11 | Status: SHIPPED | OUTPATIENT
Start: 2022-06-16 | End: 2023-04-04 | Stop reason: SDUPTHER

## 2022-06-16 RX ORDER — TESTOSTERONE CYPIONATE 200 MG/ML
300 INJECTION, SOLUTION INTRAMUSCULAR
Status: ACTIVE | OUTPATIENT
Start: 2022-06-16 | End: 2022-07-28

## 2022-06-17 LAB
ALBUMIN/CREAT UR: 123.2 UG/MG (ref 0–30)
CREAT UR-MCNC: 280 MG/DL (ref 23–375)
MICROALBUMIN UR DL<=1MG/L-MCNC: 345 UG/ML

## 2022-06-18 NOTE — PROGRESS NOTES
Subjective:      Patient ID: Kendrick Nava is a 46 y.o. male.    Chief Complaint: Follow-up      The patient is here today for routine follow up. Labs drawn earlier discussed today with the patient.  A1c improved to 9.9 - patient taking medication as prescribed but reports difficulty eating as he should, works long hours with long commute, eats a lot of fast foods.  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   Reports continued fatigue, not feeling better with increased dose testosterone.    Follow-up  Associated symptoms include fatigue. Pertinent negatives include no abdominal pain or coughing.     Review of Systems   Constitutional: Positive for fatigue.   Respiratory: Negative for cough and shortness of breath.    Cardiovascular: Negative for leg swelling.   Gastrointestinal: Negative for abdominal pain.   Musculoskeletal: Positive for back pain.     Past Medical History:   Diagnosis Date    Diabetes mellitus, type 2     Hyperlipidemia     Testosterone deficiency     LOW Testosterone          History reviewed. No pertinent surgical history.  Family History   Problem Relation Age of Onset    Diabetes type II Mother     Diabetes type II Father     Hypertension Father     Diabetes type II Brother     Cancer Maternal Grandmother     Diabetes Maternal Grandfather     No Known Problems Paternal Grandmother     Heart attack Paternal Grandfather      Social History     Socioeconomic History    Marital status:    Tobacco Use    Smoking status: Current Some Day Smoker     Types: Vaping with nicotine    Smokeless tobacco: Never Used   Substance and Sexual Activity    Alcohol use: Yes     Alcohol/week: 1.0 standard drink     Types: 1 Shots of liquor per week     Comment: once a month    Drug use: Never    Sexual activity: Yes     Partners: Male     Review of patient's allergies indicates:   Allergen Reactions    Lipitor [atorvastatin]      depression  "      Objective:       /64 (BP Location: Right arm, Patient Position: Sitting, BP Method: Large (Automatic))   Pulse 91   Temp 98 °F (36.7 °C) (Tympanic)   Ht 5' 11" (1.803 m)   Wt 122.3 kg (269 lb 10 oz)   SpO2 96%   BMI 37.60 kg/m²   Physical Exam  Constitutional:       General: He is not in acute distress.     Appearance: Normal appearance. He is well-developed. He is not ill-appearing or diaphoretic.   Cardiovascular:      Rate and Rhythm: Normal rate and regular rhythm.      Heart sounds: Normal heart sounds.   Pulmonary:      Effort: Pulmonary effort is normal.      Breath sounds: Normal breath sounds.   Neurological:      General: No focal deficit present.      Mental Status: He is alert and oriented to person, place, and time.   Psychiatric:         Mood and Affect: Mood normal.         Behavior: Behavior normal.         Thought Content: Thought content normal.         Judgment: Judgment normal.       Assessment:     1. Type 2 diabetes mellitus without complication, without long-term current use of insulin    2. Chronic bilateral low back pain without sciatica    3. Testosterone deficiency      Plan:   Type 2 diabetes mellitus without complication, without long-term current use of insulin  -     Lipid Panel; Future; Expected date: 09/14/2022  -     Hemoglobin A1C; Future; Expected date: 09/14/2022    Chronic bilateral low back pain without sciatica  -     Ambulatory referral/consult to Pain Clinic; Future; Expected date: 06/23/2022    Testosterone deficiency  -     Testosterone Panel; Future; Expected date: 09/14/2022    Other orders  -     metFORMIN (GLUCOPHAGE-XR) 750 MG ER 24hr tablet; Take 1 tablet (750 mg total) by mouth 2 (two) times daily with meals.  Dispense: 60 tablet; Refill: 11  -     empagliflozin (JARDIANCE) 25 mg tablet; Take 1 tablet (25 mg total) by mouth once daily.  Dispense: 30 tablet; Refill: 11  -     testosterone cypionate injection 300 mg    Above labs in 3 months prior to " visit with me.      Medication List with Changes/Refills   New Medications    EMPAGLIFLOZIN (JARDIANCE) 25 MG TABLET    Take 1 tablet (25 mg total) by mouth once daily.    METFORMIN (GLUCOPHAGE-XR) 750 MG ER 24HR TABLET    Take 1 tablet (750 mg total) by mouth 2 (two) times daily with meals.   Current Medications    ROSUVASTATIN (CRESTOR) 20 MG TABLET    Take 1 tablet (20 mg total) by mouth once daily.   Changed and/or Refilled Medications    Modified Medication Previous Medication    MELOXICAM (MOBIC) 15 MG TABLET meloxicam (MOBIC) 15 MG tablet       Take 1 tablet (15 mg total) by mouth once daily.    TAKE 1 TABLET(15 MG) BY MOUTH EVERY DAY   Discontinued Medications    MELOXICAM (MOBIC) 15 MG TABLET    Take 1 tablet (15 mg total) by mouth once daily.    METFORMIN (GLUCOPHAGE) 1000 MG TABLET    Take 1 tablet (1,000 mg total) by mouth 2 (two) times daily with meals.

## 2022-06-20 ENCOUNTER — CLINICAL SUPPORT (OUTPATIENT)
Dept: INTERNAL MEDICINE | Facility: CLINIC | Age: 47
End: 2022-06-20
Payer: COMMERCIAL

## 2022-06-20 VITALS — SYSTOLIC BLOOD PRESSURE: 122 MMHG | DIASTOLIC BLOOD PRESSURE: 80 MMHG

## 2022-06-20 PROCEDURE — 99999 PR PBB SHADOW E&M-EST. PATIENT-LVL II: CPT | Mod: PBBFAC,,,

## 2022-06-20 PROCEDURE — 96372 THER/PROPH/DIAG INJ SC/IM: CPT | Mod: S$GLB,,, | Performed by: FAMILY MEDICINE

## 2022-06-20 PROCEDURE — 99999 PR PBB SHADOW E&M-EST. PATIENT-LVL II: ICD-10-PCS | Mod: PBBFAC,,,

## 2022-06-20 PROCEDURE — 96372 PR INJECTION,THERAP/PROPH/DIAG2ST, IM OR SUBCUT: ICD-10-PCS | Mod: S$GLB,,, | Performed by: FAMILY MEDICINE

## 2022-06-20 RX ORDER — ROSUVASTATIN CALCIUM 20 MG/1
20 TABLET, COATED ORAL DAILY
Qty: 90 TABLET | Refills: 3 | Status: SHIPPED | OUTPATIENT
Start: 2022-06-20 | End: 2023-04-04 | Stop reason: SDUPTHER

## 2022-06-20 RX ADMIN — TESTOSTERONE CYPIONATE 300 MG: 200 INJECTION, SOLUTION INTRAMUSCULAR at 02:06

## 2022-06-20 NOTE — PROGRESS NOTES
2nd attempt  CAROL faxed to [address] to request eye exam records. Remind me set 1 week.

## 2022-06-20 NOTE — PROGRESS NOTES
Pt in clinic. Testosterone 300 mg giving to left ventrogluteal. Site cleansed with alcohol and injection given.15 min shot time explained. Pt verbalized understanding. Tolerated well.

## 2022-06-20 NOTE — TELEPHONE ENCOUNTER
No new care gaps identified.  [facility] Embedded Care Gaps. Reference number: [id number]. 6/20/2022   3:06:01 PM CDT

## 2022-07-05 ENCOUNTER — CLINICAL SUPPORT (OUTPATIENT)
Dept: INTERNAL MEDICINE | Facility: CLINIC | Age: 47
End: 2022-07-05
Payer: COMMERCIAL

## 2022-07-05 PROCEDURE — 96372 PR INJECTION,THERAP/PROPH/DIAG2ST, IM OR SUBCUT: ICD-10-PCS | Mod: S$GLB,,, | Performed by: FAMILY MEDICINE

## 2022-07-05 PROCEDURE — 99999 PR PBB SHADOW E&M-EST. PATIENT-LVL II: CPT | Mod: PBBFAC,,,

## 2022-07-05 PROCEDURE — 96372 THER/PROPH/DIAG INJ SC/IM: CPT | Mod: S$GLB,,, | Performed by: FAMILY MEDICINE

## 2022-07-05 PROCEDURE — 99999 PR PBB SHADOW E&M-EST. PATIENT-LVL II: ICD-10-PCS | Mod: PBBFAC,,,

## 2022-07-05 RX ADMIN — TESTOSTERONE CYPIONATE 300 MG: 200 INJECTION, SOLUTION INTRAMUSCULAR at 02:07

## 2022-07-05 NOTE — PROGRESS NOTES
Administered testosterone 300mg into Right ventrogluteal . Patient tolerated well ,suggested 15 min wait .

## 2022-07-14 ENCOUNTER — PATIENT MESSAGE (OUTPATIENT)
Dept: INTERNAL MEDICINE | Facility: CLINIC | Age: 47
End: 2022-07-14
Payer: COMMERCIAL

## 2022-07-15 RX ORDER — TRAMADOL HYDROCHLORIDE 50 MG/1
50 TABLET ORAL EVERY 6 HOURS
Qty: 20 TABLET | Refills: 0 | Status: SHIPPED | OUTPATIENT
Start: 2022-07-15 | End: 2022-11-21

## 2022-07-15 NOTE — TELEPHONE ENCOUNTER
Patient is currently taking meloxicam, requesting something stronger until appt with pain management.  Any recommendations? Please advise.

## 2022-07-15 NOTE — TELEPHONE ENCOUNTER
Please notify Rx for tramadol sent in, can take with the meloxicam.  Needs ultrasound scheduled this month and then follow up with Urology  Needs routine labs scheduled for September than follow-up with me

## 2022-07-19 ENCOUNTER — CLINICAL SUPPORT (OUTPATIENT)
Dept: INTERNAL MEDICINE | Facility: CLINIC | Age: 47
End: 2022-07-19
Payer: COMMERCIAL

## 2022-07-19 PROCEDURE — 99999 PR PBB SHADOW E&M-EST. PATIENT-LVL II: ICD-10-PCS | Mod: PBBFAC,,,

## 2022-07-19 PROCEDURE — 96372 PR INJECTION,THERAP/PROPH/DIAG2ST, IM OR SUBCUT: ICD-10-PCS | Mod: S$GLB,,, | Performed by: FAMILY MEDICINE

## 2022-07-19 PROCEDURE — 96372 THER/PROPH/DIAG INJ SC/IM: CPT | Mod: S$GLB,,, | Performed by: FAMILY MEDICINE

## 2022-07-19 PROCEDURE — 99999 PR PBB SHADOW E&M-EST. PATIENT-LVL II: CPT | Mod: PBBFAC,,,

## 2022-07-19 RX ORDER — TESTOSTERONE CYPIONATE 200 MG/ML
300 INJECTION, SOLUTION INTRAMUSCULAR ONCE
Status: COMPLETED | OUTPATIENT
Start: 2022-07-19 | End: 2022-07-19

## 2022-07-19 RX ADMIN — TESTOSTERONE CYPIONATE 300 MG: 200 INJECTION, SOLUTION INTRAMUSCULAR at 03:07

## 2022-07-19 NOTE — PROGRESS NOTES
Administered testosterone 300mg into left ventrogluteal. Patient tolerated well, no adverse reaction noted. Advise 15 minute wait. Pt voiced understanding.

## 2022-07-29 ENCOUNTER — HOSPITAL ENCOUNTER (OUTPATIENT)
Dept: RADIOLOGY | Facility: HOSPITAL | Age: 47
Discharge: HOME | End: 2022-07-29
Attending: NURSE PRACTITIONER
Payer: COMMERCIAL

## 2022-07-29 DIAGNOSIS — N50.89 SCROTAL MASS: ICD-10-CM

## 2022-07-29 PROCEDURE — 76870 US EXAM SCROTUM: CPT | Mod: 26,,, | Performed by: RADIOLOGY

## 2022-07-29 PROCEDURE — 76870 US SCROTUM AND TESTICLES: ICD-10-PCS | Mod: 26,,, | Performed by: RADIOLOGY

## 2022-07-29 PROCEDURE — 76870 US EXAM SCROTUM: CPT | Mod: TC

## 2022-08-02 ENCOUNTER — CLINICAL SUPPORT (OUTPATIENT)
Dept: INTERNAL MEDICINE | Facility: CLINIC | Age: 47
End: 2022-08-02
Payer: COMMERCIAL

## 2022-08-02 ENCOUNTER — PATIENT MESSAGE (OUTPATIENT)
Dept: UROLOGY | Facility: CLINIC | Age: 47
End: 2022-08-02
Payer: COMMERCIAL

## 2022-08-02 DIAGNOSIS — N50.89 SCROTAL MASS: Primary | ICD-10-CM

## 2022-08-02 PROCEDURE — 99999 PR PBB SHADOW E&M-EST. PATIENT-LVL II: CPT | Mod: PBBFAC,,,

## 2022-08-02 PROCEDURE — 96372 PR INJECTION,THERAP/PROPH/DIAG2ST, IM OR SUBCUT: ICD-10-PCS | Mod: S$GLB,,, | Performed by: FAMILY MEDICINE

## 2022-08-02 PROCEDURE — 96372 THER/PROPH/DIAG INJ SC/IM: CPT | Mod: S$GLB,,, | Performed by: FAMILY MEDICINE

## 2022-08-02 PROCEDURE — 99999 PR PBB SHADOW E&M-EST. PATIENT-LVL II: ICD-10-PCS | Mod: PBBFAC,,,

## 2022-08-02 RX ADMIN — TESTOSTERONE CYPIONATE 200 MG: 200 INJECTION, SOLUTION INTRAMUSCULAR at 03:08

## 2022-08-02 NOTE — PROGRESS NOTES
Administered testosterone 300mg into Right ventroglueal. Patient tolerated well, no adverse reaction noted. Advise 15 minute wait. Pt voiced understanding.

## 2022-08-16 ENCOUNTER — CLINICAL SUPPORT (OUTPATIENT)
Dept: INTERNAL MEDICINE | Facility: CLINIC | Age: 47
End: 2022-08-16
Payer: COMMERCIAL

## 2022-08-16 ENCOUNTER — TELEPHONE (OUTPATIENT)
Dept: INTERNAL MEDICINE | Facility: CLINIC | Age: 47
End: 2022-08-16
Payer: COMMERCIAL

## 2022-08-16 DIAGNOSIS — E34.9 TESTOSTERONE DEFICIENCY: Primary | ICD-10-CM

## 2022-08-16 PROCEDURE — 99999 PR PBB SHADOW E&M-EST. PATIENT-LVL II: CPT | Mod: PBBFAC,,,

## 2022-08-16 PROCEDURE — 96372 THER/PROPH/DIAG INJ SC/IM: CPT | Mod: S$GLB,,, | Performed by: FAMILY MEDICINE

## 2022-08-16 PROCEDURE — 96372 PR INJECTION,THERAP/PROPH/DIAG2ST, IM OR SUBCUT: ICD-10-PCS | Mod: S$GLB,,, | Performed by: FAMILY MEDICINE

## 2022-08-16 PROCEDURE — 99999 PR PBB SHADOW E&M-EST. PATIENT-LVL II: ICD-10-PCS | Mod: PBBFAC,,,

## 2022-08-16 RX ORDER — TESTOSTERONE CYPIONATE 200 MG/ML
300 INJECTION, SOLUTION INTRAMUSCULAR
Status: DISCONTINUED | OUTPATIENT
Start: 2022-08-16 | End: 2022-11-03

## 2022-08-16 RX ORDER — TESTOSTERONE CYPIONATE 200 MG/ML
200 INJECTION, SOLUTION INTRAMUSCULAR
Status: DISCONTINUED | OUTPATIENT
Start: 2022-08-16 | End: 2022-08-16

## 2022-08-16 RX ADMIN — TESTOSTERONE CYPIONATE 300 MG: 200 INJECTION, SOLUTION INTRAMUSCULAR at 01:08

## 2022-08-16 NOTE — PROGRESS NOTES
Administered testosterone 300 mg     into LVG     . Patient tolerated well, no adverse reaction noted. Advise 15 minute wait. Pt voiced understanding.

## 2022-08-29 ENCOUNTER — CLINICAL SUPPORT (OUTPATIENT)
Dept: INTERNAL MEDICINE | Facility: CLINIC | Age: 47
End: 2022-08-29
Payer: COMMERCIAL

## 2022-08-29 ENCOUNTER — OFFICE VISIT (OUTPATIENT)
Dept: PAIN MEDICINE | Facility: CLINIC | Age: 47
End: 2022-08-29
Payer: COMMERCIAL

## 2022-08-29 VITALS
HEART RATE: 81 BPM | BODY MASS INDEX: 36.41 KG/M2 | RESPIRATION RATE: 20 BRPM | WEIGHT: 260.06 LBS | SYSTOLIC BLOOD PRESSURE: 123 MMHG | HEIGHT: 71 IN | DIASTOLIC BLOOD PRESSURE: 74 MMHG

## 2022-08-29 DIAGNOSIS — M47.816 LUMBAR SPONDYLOSIS: ICD-10-CM

## 2022-08-29 DIAGNOSIS — G89.29 CHRONIC BILATERAL LOW BACK PAIN WITHOUT SCIATICA: ICD-10-CM

## 2022-08-29 DIAGNOSIS — M54.9 DORSALGIA, UNSPECIFIED: Primary | ICD-10-CM

## 2022-08-29 DIAGNOSIS — M54.50 CHRONIC BILATERAL LOW BACK PAIN WITHOUT SCIATICA: ICD-10-CM

## 2022-08-29 DIAGNOSIS — M79.18 MYALGIA, OTHER SITE: ICD-10-CM

## 2022-08-29 PROCEDURE — 3046F PR MOST RECENT HEMOGLOBIN A1C LEVEL > 9.0%: ICD-10-PCS | Mod: CPTII,S$GLB,, | Performed by: PHYSICAL MEDICINE & REHABILITATION

## 2022-08-29 PROCEDURE — 99204 PR OFFICE/OUTPT VISIT, NEW, LEVL IV, 45-59 MIN: ICD-10-PCS | Mod: S$GLB,,, | Performed by: PHYSICAL MEDICINE & REHABILITATION

## 2022-08-29 PROCEDURE — 99999 PR PBB SHADOW E&M-EST. PATIENT-LVL II: CPT | Mod: PBBFAC,,,

## 2022-08-29 PROCEDURE — 3060F POS MICROALBUMINURIA REV: CPT | Mod: CPTII,S$GLB,, | Performed by: PHYSICAL MEDICINE & REHABILITATION

## 2022-08-29 PROCEDURE — 3046F HEMOGLOBIN A1C LEVEL >9.0%: CPT | Mod: CPTII,S$GLB,, | Performed by: PHYSICAL MEDICINE & REHABILITATION

## 2022-08-29 PROCEDURE — 99999 PR PBB SHADOW E&M-EST. PATIENT-LVL IV: ICD-10-PCS | Mod: PBBFAC,,, | Performed by: PHYSICAL MEDICINE & REHABILITATION

## 2022-08-29 PROCEDURE — 1159F MED LIST DOCD IN RCRD: CPT | Mod: CPTII,S$GLB,, | Performed by: PHYSICAL MEDICINE & REHABILITATION

## 2022-08-29 PROCEDURE — 3008F PR BODY MASS INDEX (BMI) DOCUMENTED: ICD-10-PCS | Mod: CPTII,S$GLB,, | Performed by: PHYSICAL MEDICINE & REHABILITATION

## 2022-08-29 PROCEDURE — 1160F RVW MEDS BY RX/DR IN RCRD: CPT | Mod: CPTII,S$GLB,, | Performed by: PHYSICAL MEDICINE & REHABILITATION

## 2022-08-29 PROCEDURE — 3066F NEPHROPATHY DOC TX: CPT | Mod: CPTII,S$GLB,, | Performed by: PHYSICAL MEDICINE & REHABILITATION

## 2022-08-29 PROCEDURE — 3060F PR POS MICROALBUMINURIA RESULT DOCUMENTED/REVIEW: ICD-10-PCS | Mod: CPTII,S$GLB,, | Performed by: PHYSICAL MEDICINE & REHABILITATION

## 2022-08-29 PROCEDURE — 96372 THER/PROPH/DIAG INJ SC/IM: CPT | Mod: S$GLB,,, | Performed by: FAMILY MEDICINE

## 2022-08-29 PROCEDURE — 3008F BODY MASS INDEX DOCD: CPT | Mod: CPTII,S$GLB,, | Performed by: PHYSICAL MEDICINE & REHABILITATION

## 2022-08-29 PROCEDURE — 3078F PR MOST RECENT DIASTOLIC BLOOD PRESSURE < 80 MM HG: ICD-10-PCS | Mod: CPTII,S$GLB,, | Performed by: PHYSICAL MEDICINE & REHABILITATION

## 2022-08-29 PROCEDURE — 3078F DIAST BP <80 MM HG: CPT | Mod: CPTII,S$GLB,, | Performed by: PHYSICAL MEDICINE & REHABILITATION

## 2022-08-29 PROCEDURE — 3066F PR DOCUMENTATION OF TREATMENT FOR NEPHROPATHY: ICD-10-PCS | Mod: CPTII,S$GLB,, | Performed by: PHYSICAL MEDICINE & REHABILITATION

## 2022-08-29 PROCEDURE — 1159F PR MEDICATION LIST DOCUMENTED IN MEDICAL RECORD: ICD-10-PCS | Mod: CPTII,S$GLB,, | Performed by: PHYSICAL MEDICINE & REHABILITATION

## 2022-08-29 PROCEDURE — 99999 PR PBB SHADOW E&M-EST. PATIENT-LVL IV: CPT | Mod: PBBFAC,,, | Performed by: PHYSICAL MEDICINE & REHABILITATION

## 2022-08-29 PROCEDURE — 99999 PR PBB SHADOW E&M-EST. PATIENT-LVL II: ICD-10-PCS | Mod: PBBFAC,,,

## 2022-08-29 PROCEDURE — 96372 PR INJECTION,THERAP/PROPH/DIAG2ST, IM OR SUBCUT: ICD-10-PCS | Mod: S$GLB,,, | Performed by: FAMILY MEDICINE

## 2022-08-29 PROCEDURE — 3074F PR MOST RECENT SYSTOLIC BLOOD PRESSURE < 130 MM HG: ICD-10-PCS | Mod: CPTII,S$GLB,, | Performed by: PHYSICAL MEDICINE & REHABILITATION

## 2022-08-29 PROCEDURE — 3074F SYST BP LT 130 MM HG: CPT | Mod: CPTII,S$GLB,, | Performed by: PHYSICAL MEDICINE & REHABILITATION

## 2022-08-29 PROCEDURE — 99204 OFFICE O/P NEW MOD 45 MIN: CPT | Mod: S$GLB,,, | Performed by: PHYSICAL MEDICINE & REHABILITATION

## 2022-08-29 PROCEDURE — 1160F PR REVIEW ALL MEDS BY PRESCRIBER/CLIN PHARMACIST DOCUMENTED: ICD-10-PCS | Mod: CPTII,S$GLB,, | Performed by: PHYSICAL MEDICINE & REHABILITATION

## 2022-08-29 RX ORDER — HYDROCODONE BITARTRATE AND ACETAMINOPHEN 5; 325 MG/1; MG/1
1 TABLET ORAL EVERY 8 HOURS PRN
Qty: 21 TABLET | Refills: 0 | Status: SHIPPED | OUTPATIENT
Start: 2022-08-29 | End: 2022-09-05

## 2022-08-29 RX ADMIN — TESTOSTERONE CYPIONATE 300 MG: 200 INJECTION, SOLUTION INTRAMUSCULAR at 02:08

## 2022-08-29 NOTE — PROGRESS NOTES
Administered testosterone   300 mg    into  RVG    . Patient tolerated well, no adverse reaction noted. Advise 15 minute wait.

## 2022-08-29 NOTE — PROGRESS NOTES
New Patient Chronic Pain Note (Initial Visit)    Referring Physician: Genevieve Mclaughlin MD    PCP: Genevieve Mclaughlin MD    Chief Complaint:   Chief Complaint   Patient presents with    Back Pain    Leg Pain        SUBJECTIVE:    Kendrick Nava is a 46 y.o. male who presents to the clinic for the evaluation of back pain.  He was referred by his primary care provider for further evaluation and management of this pain.  The pain started a few years ago following a fall from a ladder in 2018 and symptoms have been unchanged.The pain is located in the right thoracolumbar area and radiates to the lumbosacral region with occasional radiation into the left lower extremity extending to the foot and ankle.  The pain is described as  aching, numbness, tingling burning  and is rated as 6/10. The pain is rated with a score of  4/10 on the BEST day and a score of 8/10 on the WORST day.  Symptoms interfere with daily activity. The pain is exacerbated by work activities, prolonged standing or sitting.  The pain is mitigated by activity modification.     Patient denies night fever/night sweats, urinary incontinence, bowel incontinence, significant weight loss, significant motor weakness, and loss of sensations.    Pain Disability Index Review:     Last 3 PDI Scores 8/29/2022   Pain Disability Index (PDI) 48       Non-Pharmacologic Treatments:  Physical Therapy/Home Exercise: yes  Ice/Heat:yes  TENS: no  Acupuncture: no  Massage: yes  Chiropractic: yes    Other: no      Pain Medications:  - Opioids:  Tramadol  - Adjuvant Medications:  Mobic  - Anti-Coagulants:  None     report:  Reviewed and consistent with medication use as prescribed.      Pain Procedures:   Denies      Imaging:   Outside CT and MRI of the thoracic lumbar spine reviewed 2018    X-ray lumbar spine 12/02/2021:  The vertebral bodies of the lumbar spine demonstrate a normal height and alignment.  There appears to be mild chronic vertebral body height loss at  the T11 and T12 levels with some spondylosis noted at these levels.  The disc space heights appear to be relatively well maintained.  Mild spondylosis noted anteriorly at the L3-4 and to a lesser degree the L4-5 levels.  No pars defects are noted.       Past Medical History:   Diagnosis Date    Diabetes mellitus, type 2     Hyperlipidemia     Testosterone deficiency     LOW Testosterone     History reviewed. No pertinent surgical history.  Social History     Socioeconomic History    Marital status:    Tobacco Use    Smoking status: Some Days     Types: Vaping with nicotine    Smokeless tobacco: Never   Substance and Sexual Activity    Alcohol use: Yes     Alcohol/week: 1.0 standard drink     Types: 1 Shots of liquor per week     Comment: once a month    Drug use: Never    Sexual activity: Yes     Partners: Male     Family History   Problem Relation Age of Onset    Diabetes type II Mother     Diabetes type II Father     Hypertension Father     Diabetes type II Brother     Cancer Maternal Grandmother     Diabetes Maternal Grandfather     No Known Problems Paternal Grandmother     Heart attack Paternal Grandfather        Review of patient's allergies indicates:   Allergen Reactions    Lipitor [atorvastatin]      depression       Current Outpatient Medications   Medication Sig    empagliflozin (JARDIANCE) 25 mg tablet Take 1 tablet (25 mg total) by mouth once daily.    meloxicam (MOBIC) 15 MG tablet Take 1 tablet (15 mg total) by mouth once daily.    metFORMIN (GLUCOPHAGE-XR) 750 MG ER 24hr tablet Take 1 tablet (750 mg total) by mouth 2 (two) times daily with meals.    rosuvastatin (CRESTOR) 20 MG tablet Take 1 tablet (20 mg total) by mouth once daily.    HYDROcodone-acetaminophen (NORCO) 5-325 mg per tablet Take 1 tablet by mouth every 8 (eight) hours as needed for Pain.    traMADoL (ULTRAM) 50 mg tablet Take 1 tablet (50 mg total) by mouth every 6 (six) hours. (Patient not taking: Reported on 8/29/2022)  "    Current Facility-Administered Medications   Medication    testosterone cypionate injection 300 mg       Review of Systems     GENERAL:  No weight loss, malaise or fevers.  HEENT:   No recent changes in vision or hearing  NECK:  Negative for lumps, no difficulty with swallowing.  RESPIRATORY:  Negative for cough, wheezing or shortness of breath, patient denies any recent URI.  CARDIOVASCULAR:  Negative for chest pain, leg swelling or palpitations.  GI:  Negative for abdominal discomfort, blood in stools or black stools or change in bowel habits.  MUSCULOSKELETAL:  See HPI.  SKIN:  Negative for lesions, rash, and itching.  PSYCH:  No mood disorder or recent psychosocial stressors.  Patients sleep is not disturbed secondary to pain.  HEMATOLOGY/LYMPHOLOGY:  Negative for prolonged bleeding, bruising easily or swollen nodes.  Patient is not currently taking any anti-coagulants  NEURO:   No history of headaches, syncope, paralysis, seizures or tremors.  All other reviewed and negative other than HPI.    OBJECTIVE:    /74 (BP Location: Left arm)   Pulse 81   Resp 20   Ht 5' 11" (1.803 m)   Wt 117.9 kg (260 lb 0.5 oz)   BMI 36.27 kg/m²         Physical Exam    GENERAL: Well appearing, in no acute distress, alert and oriented x3.  PSYCH:  Mood and affect appropriate.  SKIN: Skin color, texture, turgor normal, no rashes or lesions.  HEAD/FACE:  Normocephalic, atraumatic. Cranial nerves grossly intact.  CV: RRR with palpation of the radial artery.  PULM: No evidence of respiratory difficulty, symmetric chest rise.  GI:  Soft and non-tender.  BACK: Straight leg raising in the sitting and supine positions is equivocal to radicular pain.  Moderate pain to palpation over the facet joints of the lumbar spine and lumbar paraspinals, mostly on the left around the thoracolumbar junction.  Limited range of motion secondary to pain reproduction.  EXTREMITIES: Peripheral joint ROM is full and pain free without obvious " instability or laxity in all four extremities. No deformities, edema, or skin discoloration. Good capillary refill.  MUSCULOSKELETAL:  Hip and knee provocative maneuvers are negative.  There is no pain with palpation over the sacroiliac joints bilaterally.  FABERs test is negative.  FADIRs test is negative.   Bilateral upper and lower extremity strength is normal and symmetric.  No atrophy or tone abnormalities are noted.  NEURO: Bilateral upper and lower extremity coordination and muscle stretch reflexes are physiologic and symmetric.  Plantar response are downgoing. No clonus.  No loss of sensation is noted.  GAIT: normal.      LABS:  Lab Results   Component Value Date    WBC 7.52 12/02/2021    HGB 14.6 12/02/2021    HCT 43.5 12/02/2021    MCV 82 12/02/2021     12/02/2021       CMP  Sodium   Date Value Ref Range Status   06/06/2022 138 136 - 145 mmol/L Final     Potassium   Date Value Ref Range Status   06/06/2022 4.4 3.5 - 5.1 mmol/L Final     Chloride   Date Value Ref Range Status   06/06/2022 101 95 - 110 mmol/L Final     CO2   Date Value Ref Range Status   06/06/2022 27 23 - 29 mmol/L Final     Glucose   Date Value Ref Range Status   06/06/2022 192 (H) 70 - 110 mg/dL Final     BUN   Date Value Ref Range Status   06/06/2022 12 6 - 20 mg/dL Final     Creatinine   Date Value Ref Range Status   06/06/2022 0.9 0.5 - 1.4 mg/dL Final     Calcium   Date Value Ref Range Status   06/06/2022 9.0 8.7 - 10.5 mg/dL Final     Total Protein   Date Value Ref Range Status   06/06/2022 6.9 6.0 - 8.4 g/dL Final     Albumin   Date Value Ref Range Status   06/06/2022 4.4 3.5 - 5.2 g/dL Final   02/17/2022 4.5 3.6 - 5.1 g/dL Final     Total Bilirubin   Date Value Ref Range Status   06/06/2022 0.6 0.1 - 1.0 mg/dL Final     Comment:     For infants and newborns, interpretation of results should be based  on gestational age, weight and in agreement with clinical  observations.    Premature Infant recommended reference ranges:  Up  to 24 hours.............<8.0 mg/dL  Up to 48 hours............<12.0 mg/dL  3-5 days..................<15.0 mg/dL  6-29 days.................<15.0 mg/dL       Alkaline Phosphatase   Date Value Ref Range Status   06/06/2022 56 55 - 135 U/L Final     AST   Date Value Ref Range Status   06/06/2022 18 10 - 40 U/L Final     ALT   Date Value Ref Range Status   06/06/2022 25 10 - 44 U/L Final     Anion Gap   Date Value Ref Range Status   06/06/2022 10 8 - 16 mmol/L Final     eGFR if    Date Value Ref Range Status   06/06/2022 >60.0 >60 mL/min/1.73 m^2 Final     eGFR if non    Date Value Ref Range Status   06/06/2022 >60.0 >60 mL/min/1.73 m^2 Final     Comment:     Calculation used to obtain the estimated glomerular filtration  rate (eGFR) is the CKD-EPI equation.          Lab Results   Component Value Date    HGBA1C 9.9 (H) 06/06/2022             ASSESSMENT: 46 y.o. year old male with chronic lower back pain, consistent with     1. Dorsalgia, unspecified  CT Lumbar Spine Without Contrast      2. Chronic bilateral low back pain without sciatica  Ambulatory referral/consult to Pain Clinic      3. Myalgia, other site        4. Lumbar spondylosis              PLAN:   - Interventions:  None at this time .    - Anticoagulation use: no       - Medications: I have stressed the importance of physical activity and a home exercise plan to help with pain and improve health. and Patient can continue with medications for now since they are providing benefits, using them appropriately, and without side effects.     Provide short course of Norco 5/325 mg Q 8 p.r.n., 21 tablets, 0 refills.  I reviewed the  and is consistent patient's history.    - Therapy:  Advised patient continue with activities as tolerated    - Psychological:  Discussed coping mechanisms to help address chronic pain issues    - Labs:  Reviewed    - Imaging: Reviewed available imaging with patient and answered any questions they had  regarding study.  Obtain CT lumbar spine    - Consults/Referrals:  None at this time    - Records:  Reviewed/Obtain old records from outside physicians and imaging    - Follow up visit: return to clinic as needed, will contact patient following CT lumbar spine    - Counseled patient regarding the importance of activity modification and physical therapy    - This condition does not require this patient to take time off of work, and the primary goal of our Pain Management services is to improve the patient's functional capacity.    - Patient Questions: Answered all of the patient's questions regarding diagnosis, therapy, and treatment        The above plan and management options were discussed at length with patient. Patient is in agreement with the above and verbalized understanding.    I discussed the goals of interventional chronic pain management with the patient on today's visit.  I explained the utility of injections for diagnostic and therapeutic purposes.  We discussed a multimodal approach to pain including treating the patient's given worst pain at any given time.  We will use a systematic approach to addressing pain.  We will also adopt a multimodal approach that includes injections, adjuvant medications, physical therapy, at times psychiatry.  There may be a limited role for opioid use intermittently in the treatment of pain, more particularly for acute pain although no one approach can be used as a sole treatment modality.    I emphasized the importance of regular exercise, core strengthening and stretching, diet and weight loss as a cornerstone of long-term pain management.      Nico Angeles MD  Interventional Pain Management  [facility]    Disclaimer:  This note was prepared using voice recognition system and is likely to have sound alike errors that may have been overlooked even after proof reading.  Please call me with any questions

## 2022-09-12 ENCOUNTER — CLINICAL SUPPORT (OUTPATIENT)
Dept: FAMILY MEDICINE | Facility: CLINIC | Age: 47
End: 2022-09-12
Payer: COMMERCIAL

## 2022-09-12 DIAGNOSIS — E34.9 TESTOSTERONE DEFICIENCY: Primary | ICD-10-CM

## 2022-09-12 PROCEDURE — 96372 PR INJECTION,THERAP/PROPH/DIAG2ST, IM OR SUBCUT: ICD-10-PCS | Mod: S$GLB,,, | Performed by: FAMILY MEDICINE

## 2022-09-12 PROCEDURE — 99999 PR PBB SHADOW E&M-EST. PATIENT-LVL II: ICD-10-PCS | Mod: PBBFAC,,,

## 2022-09-12 PROCEDURE — 96372 THER/PROPH/DIAG INJ SC/IM: CPT | Mod: S$GLB,,, | Performed by: FAMILY MEDICINE

## 2022-09-12 PROCEDURE — 99999 PR PBB SHADOW E&M-EST. PATIENT-LVL II: CPT | Mod: PBBFAC,,,

## 2022-09-12 RX ADMIN — TESTOSTERONE CYPIONATE 300 MG: 200 INJECTION, SOLUTION INTRAMUSCULAR at 02:09

## 2022-09-12 NOTE — PROGRESS NOTES
Testosterone 300 Mg given IM  LEFT ventrogluteal , patient tolerated well, recommended 15 minute wait to watch for adverse reactions.

## 2022-09-22 ENCOUNTER — TELEPHONE (OUTPATIENT)
Dept: INTERNAL MEDICINE | Facility: CLINIC | Age: 47
End: 2022-09-22
Payer: COMMERCIAL

## 2022-09-23 ENCOUNTER — PATIENT OUTREACH (OUTPATIENT)
Dept: ADMINISTRATIVE | Facility: HOSPITAL | Age: 47
End: 2022-09-23
Payer: COMMERCIAL

## 2022-09-23 NOTE — LETTER
AUTHORIZATION FOR RELEASE OF   CONFIDENTIAL INFORMATION    Dear Narendra Carson MD,    We are seeing Kendrick Nava, date of birth 1975, in the clinic at [facility]. Genevieve Mclaughlin MD is the patient's PCP. Kendrick Nava has an outstanding lab/procedure at the time we reviewed his chart. In order to help keep his health information updated, he has authorized us to request the following medical record(s):        (  )  MAMMOGRAM                                      (  )  COLONOSCOPY      (  )  PAP SMEAR                                          (  )  OUTSIDE LAB RESULTS     (  )  DEXA SCAN                                          ( x ) DIABETIC EYE EXAM            (  )  FOOT EXAM                                          (  )  ENTIRE RECORD     (  )  OUTSIDE IMMUNIZATIONS                 (  )  _______________         Please fax records to [facility], Jeanenne C Brignac, MD, [phone]     If you have any questions, please contact   Niki KRAMER LPN   Care Coordination   [facility]  Phone [phone]      Patient Name: Kendrick Nava  : 1975  MRN [id number]  Patient Phone #: [phone]

## 2022-09-23 NOTE — PROGRESS NOTES
Working Diabetic Eye Exam Report.    Pt has reported eye exam completed at [facility] on [facility].  Spoke to Lula with Dr Narendra Carson MD and she confirmed pt had eye exam completed Nov 2021. He does not have an appt scheduled at this time for 2022.  Requested a copy of report. She said she would give to  on his return Monday and have it sent over.

## 2022-09-26 ENCOUNTER — CLINICAL SUPPORT (OUTPATIENT)
Dept: INTERNAL MEDICINE | Facility: CLINIC | Age: 47
End: 2022-09-26
Payer: COMMERCIAL

## 2022-09-26 PROCEDURE — 99999 PR PBB SHADOW E&M-EST. PATIENT-LVL II: ICD-10-PCS | Mod: PBBFAC,,,

## 2022-09-26 PROCEDURE — 96372 PR INJECTION,THERAP/PROPH/DIAG2ST, IM OR SUBCUT: ICD-10-PCS | Mod: S$GLB,,, | Performed by: FAMILY MEDICINE

## 2022-09-26 PROCEDURE — 96372 THER/PROPH/DIAG INJ SC/IM: CPT | Mod: S$GLB,,, | Performed by: FAMILY MEDICINE

## 2022-09-26 PROCEDURE — 99999 PR PBB SHADOW E&M-EST. PATIENT-LVL II: CPT | Mod: PBBFAC,,,

## 2022-09-26 RX ADMIN — TESTOSTERONE CYPIONATE 300 MG: 200 INJECTION, SOLUTION INTRAMUSCULAR at 12:09

## 2022-09-26 NOTE — PROGRESS NOTES
Administered testosterone   300  mg  into      . Patient tolerated well, no adverse reaction noted. Advise 15 minute wait.

## 2022-10-10 ENCOUNTER — CLINICAL SUPPORT (OUTPATIENT)
Dept: INTERNAL MEDICINE | Facility: CLINIC | Age: 47
End: 2022-10-10
Payer: COMMERCIAL

## 2022-10-10 PROCEDURE — 96372 THER/PROPH/DIAG INJ SC/IM: CPT | Mod: S$GLB,,, | Performed by: FAMILY MEDICINE

## 2022-10-10 PROCEDURE — 99999 PR PBB SHADOW E&M-EST. PATIENT-LVL II: CPT | Mod: PBBFAC,,,

## 2022-10-10 PROCEDURE — 96372 PR INJECTION,THERAP/PROPH/DIAG2ST, IM OR SUBCUT: ICD-10-PCS | Mod: S$GLB,,, | Performed by: FAMILY MEDICINE

## 2022-10-10 PROCEDURE — 99999 PR PBB SHADOW E&M-EST. PATIENT-LVL II: ICD-10-PCS | Mod: PBBFAC,,,

## 2022-10-10 RX ADMIN — TESTOSTERONE CYPIONATE 300 MG: 200 INJECTION, SOLUTION INTRAMUSCULAR at 03:10

## 2022-10-10 NOTE — PROGRESS NOTES
Administered testosterone  300 mg     into   lvg   . Patient tolerated well, no adverse reaction noted. Advise 15 minute wait.

## 2022-10-24 ENCOUNTER — CLINICAL SUPPORT (OUTPATIENT)
Dept: INTERNAL MEDICINE | Facility: CLINIC | Age: 47
End: 2022-10-24
Payer: COMMERCIAL

## 2022-10-24 PROCEDURE — 99999 PR PBB SHADOW E&M-EST. PATIENT-LVL II: CPT | Mod: PBBFAC,,,

## 2022-10-24 PROCEDURE — 96372 PR INJECTION,THERAP/PROPH/DIAG2ST, IM OR SUBCUT: ICD-10-PCS | Mod: S$GLB,,, | Performed by: FAMILY MEDICINE

## 2022-10-24 PROCEDURE — 96372 THER/PROPH/DIAG INJ SC/IM: CPT | Mod: S$GLB,,, | Performed by: FAMILY MEDICINE

## 2022-10-24 PROCEDURE — 99999 PR PBB SHADOW E&M-EST. PATIENT-LVL II: ICD-10-PCS | Mod: PBBFAC,,,

## 2022-10-24 RX ADMIN — TESTOSTERONE CYPIONATE 300 MG: 200 INJECTION, SOLUTION INTRAMUSCULAR at 02:10

## 2022-11-03 ENCOUNTER — PATIENT MESSAGE (OUTPATIENT)
Dept: INTERNAL MEDICINE | Facility: CLINIC | Age: 47
End: 2022-11-03
Payer: COMMERCIAL

## 2022-11-03 RX ORDER — MELOXICAM 15 MG/1
TABLET ORAL
Qty: 30 TABLET | Refills: 0 | Status: SHIPPED | OUTPATIENT
Start: 2022-11-03 | End: 2022-11-21

## 2022-11-03 NOTE — TELEPHONE ENCOUNTER
Please notify needs labs then appointment with me.  He cannot have testosterone injection on Monday unless he has labs prior to it

## 2022-11-03 NOTE — TELEPHONE ENCOUNTER
Spoke with patient labs scheduled same day testosterone injection , educated has to be drawn before injection can be given will schedule appt to see dr calderon when given injection

## 2022-11-04 ENCOUNTER — LAB VISIT (OUTPATIENT)
Dept: LAB | Facility: HOSPITAL | Age: 47
End: 2022-11-04
Attending: FAMILY MEDICINE
Payer: COMMERCIAL

## 2022-11-04 DIAGNOSIS — E34.9 TESTOSTERONE DEFICIENCY: ICD-10-CM

## 2022-11-04 DIAGNOSIS — E11.9 TYPE 2 DIABETES MELLITUS WITHOUT COMPLICATION, WITHOUT LONG-TERM CURRENT USE OF INSULIN: ICD-10-CM

## 2022-11-04 LAB
CHOLEST SERPL-MCNC: 138 MG/DL (ref 120–199)
CHOLEST/HDLC SERPL: 3.8 {RATIO} (ref 2–5)
ESTIMATED AVG GLUCOSE: 183 MG/DL (ref 68–131)
HBA1C MFR BLD: 8 % (ref 4–5.6)
HDLC SERPL-MCNC: 36 MG/DL (ref 40–75)
HDLC SERPL: 26.1 % (ref 20–50)
LDLC SERPL CALC-MCNC: 69.8 MG/DL (ref 63–159)
NONHDLC SERPL-MCNC: 102 MG/DL
TRIGL SERPL-MCNC: 161 MG/DL (ref 30–150)

## 2022-11-04 PROCEDURE — 83036 HEMOGLOBIN GLYCOSYLATED A1C: CPT | Performed by: FAMILY MEDICINE

## 2022-11-04 PROCEDURE — 36415 COLL VENOUS BLD VENIPUNCTURE: CPT | Mod: PO | Performed by: FAMILY MEDICINE

## 2022-11-04 PROCEDURE — 84270 ASSAY OF SEX HORMONE GLOBUL: CPT | Performed by: FAMILY MEDICINE

## 2022-11-04 PROCEDURE — 80061 LIPID PANEL: CPT | Performed by: FAMILY MEDICINE

## 2022-11-07 ENCOUNTER — TELEPHONE (OUTPATIENT)
Dept: INTERNAL MEDICINE | Facility: CLINIC | Age: 47
End: 2022-11-07

## 2022-11-07 ENCOUNTER — CLINICAL SUPPORT (OUTPATIENT)
Dept: INTERNAL MEDICINE | Facility: CLINIC | Age: 47
End: 2022-11-07
Payer: COMMERCIAL

## 2022-11-07 PROCEDURE — 96372 PR INJECTION,THERAP/PROPH/DIAG2ST, IM OR SUBCUT: ICD-10-PCS | Mod: S$GLB,,, | Performed by: FAMILY MEDICINE

## 2022-11-07 PROCEDURE — 99999 PR PBB SHADOW E&M-EST. PATIENT-LVL II: ICD-10-PCS | Mod: PBBFAC,,,

## 2022-11-07 PROCEDURE — 99999 PR PBB SHADOW E&M-EST. PATIENT-LVL II: CPT | Mod: PBBFAC,,,

## 2022-11-07 PROCEDURE — 96372 THER/PROPH/DIAG INJ SC/IM: CPT | Mod: S$GLB,,, | Performed by: FAMILY MEDICINE

## 2022-11-07 RX ORDER — TESTOSTERONE CYPIONATE 200 MG/ML
300 INJECTION, SOLUTION INTRAMUSCULAR
Status: COMPLETED | OUTPATIENT
Start: 2022-11-07 | End: 2022-11-07

## 2022-11-07 RX ADMIN — TESTOSTERONE CYPIONATE 300 MG: 200 INJECTION, SOLUTION INTRAMUSCULAR at 09:11

## 2022-11-10 LAB
ALBUMIN SERPL-MCNC: 4.8 G/DL (ref 3.6–5.1)
SHBG SERPL-SCNC: 17 NMOL/L (ref 10–50)
TESTOST FREE SERPL-MCNC: 75.4 PG/ML (ref 46–224)
TESTOST SERPL-MCNC: 368 NG/DL (ref 250–1100)
TESTOSTERONE.FREE+WB SERPL-MCNC: 164.8 NG/DL (ref 110–575)

## 2022-11-21 ENCOUNTER — OFFICE VISIT (OUTPATIENT)
Dept: INTERNAL MEDICINE | Facility: CLINIC | Age: 47
End: 2022-11-21
Payer: COMMERCIAL

## 2022-11-21 ENCOUNTER — TELEPHONE (OUTPATIENT)
Dept: INTERNAL MEDICINE | Facility: CLINIC | Age: 47
End: 2022-11-21
Payer: COMMERCIAL

## 2022-11-21 ENCOUNTER — PATIENT MESSAGE (OUTPATIENT)
Dept: ADMINISTRATIVE | Facility: HOSPITAL | Age: 47
End: 2022-11-21
Payer: COMMERCIAL

## 2022-11-21 VITALS
OXYGEN SATURATION: 97 % | BODY MASS INDEX: 35.83 KG/M2 | DIASTOLIC BLOOD PRESSURE: 80 MMHG | SYSTOLIC BLOOD PRESSURE: 130 MMHG | WEIGHT: 255.94 LBS | TEMPERATURE: 97 F | HEART RATE: 94 BPM | HEIGHT: 71 IN

## 2022-11-21 DIAGNOSIS — E34.9 TESTOSTERONE DEFICIENCY: ICD-10-CM

## 2022-11-21 DIAGNOSIS — E78.5 HYPERLIPIDEMIA, UNSPECIFIED HYPERLIPIDEMIA TYPE: ICD-10-CM

## 2022-11-21 DIAGNOSIS — E11.9 TYPE 2 DIABETES MELLITUS WITHOUT COMPLICATION, WITHOUT LONG-TERM CURRENT USE OF INSULIN: Primary | ICD-10-CM

## 2022-11-21 DIAGNOSIS — N50.89 TESTICULAR MASS: ICD-10-CM

## 2022-11-21 PROCEDURE — 99214 PR OFFICE/OUTPT VISIT, EST, LEVL IV, 30-39 MIN: ICD-10-PCS | Mod: 25,S$GLB,, | Performed by: FAMILY MEDICINE

## 2022-11-21 PROCEDURE — 99214 OFFICE O/P EST MOD 30 MIN: CPT | Mod: 25,S$GLB,, | Performed by: FAMILY MEDICINE

## 2022-11-21 PROCEDURE — 3008F BODY MASS INDEX DOCD: CPT | Mod: CPTII,S$GLB,, | Performed by: FAMILY MEDICINE

## 2022-11-21 PROCEDURE — 3075F PR MOST RECENT SYSTOLIC BLOOD PRESS GE 130-139MM HG: ICD-10-PCS | Mod: CPTII,S$GLB,, | Performed by: FAMILY MEDICINE

## 2022-11-21 PROCEDURE — 3079F PR MOST RECENT DIASTOLIC BLOOD PRESSURE 80-89 MM HG: ICD-10-PCS | Mod: CPTII,S$GLB,, | Performed by: FAMILY MEDICINE

## 2022-11-21 PROCEDURE — 96372 THER/PROPH/DIAG INJ SC/IM: CPT | Mod: S$GLB,,, | Performed by: FAMILY MEDICINE

## 2022-11-21 PROCEDURE — 3066F PR DOCUMENTATION OF TREATMENT FOR NEPHROPATHY: ICD-10-PCS | Mod: CPTII,S$GLB,, | Performed by: FAMILY MEDICINE

## 2022-11-21 PROCEDURE — 3075F SYST BP GE 130 - 139MM HG: CPT | Mod: CPTII,S$GLB,, | Performed by: FAMILY MEDICINE

## 2022-11-21 PROCEDURE — 1159F PR MEDICATION LIST DOCUMENTED IN MEDICAL RECORD: ICD-10-PCS | Mod: CPTII,S$GLB,, | Performed by: FAMILY MEDICINE

## 2022-11-21 PROCEDURE — 3066F NEPHROPATHY DOC TX: CPT | Mod: CPTII,S$GLB,, | Performed by: FAMILY MEDICINE

## 2022-11-21 PROCEDURE — 99999 PR PBB SHADOW E&M-EST. PATIENT-LVL IV: ICD-10-PCS | Mod: PBBFAC,,, | Performed by: FAMILY MEDICINE

## 2022-11-21 PROCEDURE — 96372 PR INJECTION,THERAP/PROPH/DIAG2ST, IM OR SUBCUT: ICD-10-PCS | Mod: S$GLB,,, | Performed by: FAMILY MEDICINE

## 2022-11-21 PROCEDURE — 1159F MED LIST DOCD IN RCRD: CPT | Mod: CPTII,S$GLB,, | Performed by: FAMILY MEDICINE

## 2022-11-21 PROCEDURE — 3079F DIAST BP 80-89 MM HG: CPT | Mod: CPTII,S$GLB,, | Performed by: FAMILY MEDICINE

## 2022-11-21 PROCEDURE — 3052F HG A1C>EQUAL 8.0%<EQUAL 9.0%: CPT | Mod: CPTII,S$GLB,, | Performed by: FAMILY MEDICINE

## 2022-11-21 PROCEDURE — 3060F PR POS MICROALBUMINURIA RESULT DOCUMENTED/REVIEW: ICD-10-PCS | Mod: CPTII,S$GLB,, | Performed by: FAMILY MEDICINE

## 2022-11-21 PROCEDURE — 3060F POS MICROALBUMINURIA REV: CPT | Mod: CPTII,S$GLB,, | Performed by: FAMILY MEDICINE

## 2022-11-21 PROCEDURE — 3008F PR BODY MASS INDEX (BMI) DOCUMENTED: ICD-10-PCS | Mod: CPTII,S$GLB,, | Performed by: FAMILY MEDICINE

## 2022-11-21 PROCEDURE — 99999 PR PBB SHADOW E&M-EST. PATIENT-LVL IV: CPT | Mod: PBBFAC,,, | Performed by: FAMILY MEDICINE

## 2022-11-21 PROCEDURE — 3052F PR MOST RECENT HEMOGLOBIN A1C LEVEL 8.0 - < 9.0%: ICD-10-PCS | Mod: CPTII,S$GLB,, | Performed by: FAMILY MEDICINE

## 2022-11-21 RX ORDER — METFORMIN HYDROCHLORIDE 750 MG/1
TABLET, EXTENDED RELEASE ORAL
Qty: 90 TABLET | Refills: 11 | Status: SHIPPED | OUTPATIENT
Start: 2022-11-21 | End: 2022-12-19 | Stop reason: SDUPTHER

## 2022-11-21 RX ORDER — TESTOSTERONE CYPIONATE 200 MG/ML
300 INJECTION, SOLUTION INTRAMUSCULAR
Status: COMPLETED | OUTPATIENT
Start: 2022-11-21 | End: 2022-12-19

## 2022-11-21 RX ORDER — HYDROCODONE BITARTRATE AND ACETAMINOPHEN 5; 325 MG/1; MG/1
1 TABLET ORAL EVERY 6 HOURS PRN
Qty: 21 TABLET | Refills: 0 | Status: SHIPPED | OUTPATIENT
Start: 2022-11-21 | End: 2023-01-13 | Stop reason: SDUPTHER

## 2022-11-21 RX ADMIN — TESTOSTERONE CYPIONATE 300 MG: 200 INJECTION, SOLUTION INTRAMUSCULAR at 04:11

## 2022-11-22 RX ORDER — TESTOSTERONE CYPIONATE 200 MG/ML
300 INJECTION, SOLUTION INTRAMUSCULAR
Status: ACTIVE | OUTPATIENT
Start: 2022-11-22 | End: 2023-01-03

## 2022-11-22 NOTE — PROGRESS NOTES
Subjective:      Patient ID: Kendrick Nava is a 47 y.o. male.    Chief Complaint: Follow-up      Patient here for routine follow up on diabetes, HTN, hyperlipidemia, low testosterone. Reviewed labs drawn recently today with the patient.   A1c is improved 8.0.  Patient reports taking medications regularly but having difficulty adhering to diet  Lipids at goal.  Blood pressure controlled today.  Testosterone levels in normal range.  Patient reports continued severe back pain-had CT ordered by his pain management doctor which he has been unable to get scheduled.  Requesting refill on hydrocodone, takes about half for the tablets daily only when needed.   reinforced this.    Follow-up  Associated symptoms include arthralgias.   Review of Systems   Constitutional:  Negative for appetite change.   Respiratory:  Negative for shortness of breath.    Cardiovascular:  Negative for leg swelling.   Musculoskeletal:  Positive for arthralgias and back pain.   Past Medical History:   Diagnosis Date    Diabetes mellitus, type 2     Hyperlipidemia     Testosterone deficiency     LOW Testosterone          No past surgical history on file.  Family History   Problem Relation Age of Onset    Diabetes type II Mother     Diabetes type II Father     Hypertension Father     Diabetes type II Brother     Cancer Maternal Grandmother     Diabetes Maternal Grandfather     No Known Problems Paternal Grandmother     Heart attack Paternal Grandfather      Social History     Socioeconomic History    Marital status:    Tobacco Use    Smoking status: Some Days     Types: Vaping with nicotine    Smokeless tobacco: Never   Substance and Sexual Activity    Alcohol use: Yes     Alcohol/week: 1.0 standard drink     Types: 1 Shots of liquor per week     Comment: once a month    Drug use: Never    Sexual activity: Yes     Partners: Male     Review of patient's allergies indicates:   Allergen Reactions    Lipitor [atorvastatin]      depression  "      Objective:       /80 (BP Location: Right arm, Patient Position: Sitting, BP Method: Medium (Manual))   Pulse 94   Temp 97.2 °F (36.2 °C) (Temporal)   Ht 5' 11" (1.803 m)   Wt 116.1 kg (255 lb 15.3 oz)   SpO2 97%   BMI 35.70 kg/m²   Physical Exam  Constitutional:       General: He is not in acute distress.     Appearance: Normal appearance. He is well-developed. He is not ill-appearing or diaphoretic.   Cardiovascular:      Rate and Rhythm: Normal rate and regular rhythm.      Heart sounds: Normal heart sounds.   Pulmonary:      Effort: Pulmonary effort is normal.      Breath sounds: Normal breath sounds.   Musculoskeletal:      Right lower leg: No edema.      Left lower leg: No edema.   Neurological:      General: No focal deficit present.      Mental Status: He is alert and oriented to person, place, and time.   Psychiatric:         Mood and Affect: Mood normal.         Behavior: Behavior normal.         Thought Content: Thought content normal.         Judgment: Judgment normal.     Assessment:     1. Type 2 diabetes mellitus without complication, without long-term current use of insulin    2. Testicular mass    3. Hyperlipidemia, unspecified hyperlipidemia type    4. Testosterone deficiency      Plan:   Type 2 diabetes mellitus without complication, without long-term current use of insulin  -     Hemoglobin A1C; Future; Expected date: 02/19/2023  -     Lipid Panel; Future; Expected date: 02/19/2023  -     Comprehensive Metabolic Panel; Future; Expected date: 02/19/2023  -     CBC Auto Differential; Future; Expected date: 02/19/2023    Testicular mass  -     testosterone cypionate injection 300 mg    Hyperlipidemia, unspecified hyperlipidemia type    Testosterone deficiency    Other orders  -     metFORMIN (GLUCOPHAGE-XR) 750 MG ER 24hr tablet; Take two tablets with breakfast and one in the evenings.  Dispense: 90 tablet; Refill: 11  -     HYDROcodone-acetaminophen (NORCO) 5-325 mg per tablet; " Take 1 tablet by mouth every 6 (six) hours as needed for Pain.  Dispense: 21 tablet; Refill: 0    Above labs in 3 months prior to visit with me.  Continue all other current medications.     Medication List with Changes/Refills   New Medications    HYDROCODONE-ACETAMINOPHEN (NORCO) 5-325 MG PER TABLET    Take 1 tablet by mouth every 6 (six) hours as needed for Pain.   Current Medications    EMPAGLIFLOZIN (JARDIANCE) 25 MG TABLET    Take 1 tablet (25 mg total) by mouth once daily.    ROSUVASTATIN (CRESTOR) 20 MG TABLET    Take 1 tablet (20 mg total) by mouth once daily.   Changed and/or Refilled Medications    Modified Medication Previous Medication    METFORMIN (GLUCOPHAGE-XR) 750 MG ER 24HR TABLET metFORMIN (GLUCOPHAGE-XR) 750 MG ER 24hr tablet       Take two tablets with breakfast and one in the evenings.    Take 1 tablet (750 mg total) by mouth 2 (two) times daily with meals.   Discontinued Medications    MELOXICAM (MOBIC) 15 MG TABLET    TAKE 1 TABLET(15 MG) BY MOUTH EVERY DAY    TRAMADOL (ULTRAM) 50 MG TABLET    Take 1 tablet (50 mg total) by mouth every 6 (six) hours.

## 2022-11-23 ENCOUNTER — PATIENT MESSAGE (OUTPATIENT)
Dept: INTERNAL MEDICINE | Facility: CLINIC | Age: 47
End: 2022-11-23
Payer: COMMERCIAL

## 2022-11-23 ENCOUNTER — E-CONSULT (OUTPATIENT)
Dept: UROLOGY | Facility: CLINIC | Age: 47
End: 2022-11-23
Payer: COMMERCIAL

## 2022-11-23 DIAGNOSIS — N50.89 SCROTAL MASS: Primary | ICD-10-CM

## 2022-11-23 PROCEDURE — 99446 PR INTERPROF, PHONE/INTERNET/EHR, CONSULT, 5-10 MINS: ICD-10-PCS | Mod: S$GLB,,, | Performed by: UROLOGY

## 2022-11-23 PROCEDURE — 99446 NTRPROF PH1/NTRNET/EHR 5-10: CPT | Mod: S$GLB,,, | Performed by: UROLOGY

## 2022-11-23 NOTE — PROGRESS NOTES
[facility] - Urology  Response for E-Consult     Patient Name: Kendrick Nava  MRN: [id number]  Primary Care Provider: Genevieve Mclaughlin MD   Requesting Provider: Genevieve Mclaughlin MD      Findings:  Left scrotal mass    Still persistent left scrotal mass seen on scrotal imaging.  It appears to have actually decreased or remained stable since last time it was viewed by ultrasound.  This is not a testicular lesion consistent with testicular carcinoma, no indication to stop testosterone replacement therapy.  Patient had questions of whether imaging needed to be continued.  At this point I do not believe that Q 6 month imaging is warranted, as this appears to be stable.  Would continue yearly imaging, unless patient would want to pursue surgical excision.  This concerned is due to the fact that it is not a cyst, but a solid lesion.  Would be happy to see him in the office for further discussions.    I did not speak to the requesting provider verbally about this.     Total time of Consultation: 10 minute    Percentage of time spent on verbal/written discussion: 50%     *This eConsult is based on the clinical data available to me and is furnished without benefit of a physical examination. The eConsult will need to be interpreted in light of any clinical issues or changes in patient status not available to me at the time of filing this eConsults. Significant changes in patient condition or level of acuity should result in immediate formal consultation and reevaluation. Please alert me if you have further questions.    Thank you for your consult.     MD Colette Dietz - Urology

## 2022-12-02 ENCOUNTER — HOSPITAL ENCOUNTER (OUTPATIENT)
Dept: RADIOLOGY | Facility: HOSPITAL | Age: 47
Discharge: HOME | End: 2022-12-02
Attending: PHYSICAL MEDICINE & REHABILITATION
Payer: COMMERCIAL

## 2022-12-02 DIAGNOSIS — M54.9 DORSALGIA, UNSPECIFIED: ICD-10-CM

## 2022-12-02 PROCEDURE — 72131 CT LUMBAR SPINE W/O DYE: CPT | Mod: TC

## 2022-12-03 LAB
LEFT EYE DM RETINOPATHY: NEGATIVE
RIGHT EYE DM RETINOPATHY: NEGATIVE

## 2022-12-05 ENCOUNTER — CLINICAL SUPPORT (OUTPATIENT)
Dept: INTERNAL MEDICINE | Facility: CLINIC | Age: 47
End: 2022-12-05
Payer: COMMERCIAL

## 2022-12-05 PROCEDURE — 96372 THER/PROPH/DIAG INJ SC/IM: CPT | Mod: S$GLB,,, | Performed by: FAMILY MEDICINE

## 2022-12-05 PROCEDURE — 96372 PR INJECTION,THERAP/PROPH/DIAG2ST, IM OR SUBCUT: ICD-10-PCS | Mod: S$GLB,,, | Performed by: FAMILY MEDICINE

## 2022-12-05 PROCEDURE — 99999 PR PBB SHADOW E&M-EST. PATIENT-LVL II: ICD-10-PCS | Mod: PBBFAC,,,

## 2022-12-05 PROCEDURE — 99999 PR PBB SHADOW E&M-EST. PATIENT-LVL II: CPT | Mod: PBBFAC,,,

## 2022-12-05 RX ADMIN — TESTOSTERONE CYPIONATE 300 MG: 200 INJECTION, SOLUTION INTRAMUSCULAR at 03:12

## 2022-12-19 ENCOUNTER — CLINICAL SUPPORT (OUTPATIENT)
Dept: INTERNAL MEDICINE | Facility: CLINIC | Age: 47
End: 2022-12-19
Payer: COMMERCIAL

## 2022-12-19 PROCEDURE — 96372 PR INJECTION,THERAP/PROPH/DIAG2ST, IM OR SUBCUT: ICD-10-PCS | Mod: S$GLB,,, | Performed by: FAMILY MEDICINE

## 2022-12-19 PROCEDURE — 99999 PR PBB SHADOW E&M-EST. PATIENT-LVL II: ICD-10-PCS | Mod: PBBFAC,,,

## 2022-12-19 PROCEDURE — 96372 THER/PROPH/DIAG INJ SC/IM: CPT | Mod: S$GLB,,, | Performed by: FAMILY MEDICINE

## 2022-12-19 PROCEDURE — 99999 PR PBB SHADOW E&M-EST. PATIENT-LVL II: CPT | Mod: PBBFAC,,,

## 2022-12-19 RX ADMIN — TESTOSTERONE CYPIONATE 300 MG: 200 INJECTION, SOLUTION INTRAMUSCULAR at 01:12

## 2022-12-20 DIAGNOSIS — Z12.31 OTHER SCREENING MAMMOGRAM: ICD-10-CM

## 2022-12-28 ENCOUNTER — PATIENT MESSAGE (OUTPATIENT)
Dept: PAIN MEDICINE | Facility: CLINIC | Age: 47
End: 2022-12-28
Payer: COMMERCIAL

## 2023-01-03 ENCOUNTER — CLINICAL SUPPORT (OUTPATIENT)
Dept: INTERNAL MEDICINE | Facility: CLINIC | Age: 48
End: 2023-01-03
Payer: COMMERCIAL

## 2023-01-03 ENCOUNTER — TELEPHONE (OUTPATIENT)
Dept: INTERNAL MEDICINE | Facility: CLINIC | Age: 48
End: 2023-01-03
Payer: COMMERCIAL

## 2023-01-03 DIAGNOSIS — E34.9 TESTOSTERONE DEFICIENCY: Primary | ICD-10-CM

## 2023-01-03 PROCEDURE — 99999 PR PBB SHADOW E&M-EST. PATIENT-LVL II: ICD-10-PCS | Mod: PBBFAC,,,

## 2023-01-03 PROCEDURE — 96372 THER/PROPH/DIAG INJ SC/IM: CPT | Mod: S$GLB,,, | Performed by: FAMILY MEDICINE

## 2023-01-03 PROCEDURE — 99999 PR PBB SHADOW E&M-EST. PATIENT-LVL II: CPT | Mod: PBBFAC,,,

## 2023-01-03 PROCEDURE — 96372 PR INJECTION,THERAP/PROPH/DIAG2ST, IM OR SUBCUT: ICD-10-PCS | Mod: S$GLB,,, | Performed by: FAMILY MEDICINE

## 2023-01-03 RX ORDER — TESTOSTERONE CYPIONATE 200 MG/ML
300 INJECTION, SOLUTION INTRAMUSCULAR
Status: DISCONTINUED | OUTPATIENT
Start: 2023-01-03 | End: 2023-04-03

## 2023-01-03 RX ADMIN — TESTOSTERONE CYPIONATE 300 MG: 200 INJECTION, SOLUTION INTRAMUSCULAR at 09:01

## 2023-01-13 ENCOUNTER — OFFICE VISIT (OUTPATIENT)
Dept: PAIN MEDICINE | Facility: CLINIC | Age: 48
End: 2023-01-13
Payer: COMMERCIAL

## 2023-01-13 VITALS
HEIGHT: 71 IN | HEART RATE: 75 BPM | DIASTOLIC BLOOD PRESSURE: 63 MMHG | WEIGHT: 258.19 LBS | BODY MASS INDEX: 36.15 KG/M2 | SYSTOLIC BLOOD PRESSURE: 121 MMHG | RESPIRATION RATE: 17 BRPM

## 2023-01-13 DIAGNOSIS — M54.16 LUMBAR RADICULOPATHY: Primary | ICD-10-CM

## 2023-01-13 PROCEDURE — 3074F SYST BP LT 130 MM HG: CPT | Mod: CPTII,S$GLB,, | Performed by: PHYSICIAN ASSISTANT

## 2023-01-13 PROCEDURE — 99214 PR OFFICE/OUTPT VISIT, EST, LEVL IV, 30-39 MIN: ICD-10-PCS | Mod: S$GLB,,, | Performed by: PHYSICIAN ASSISTANT

## 2023-01-13 PROCEDURE — 3008F BODY MASS INDEX DOCD: CPT | Mod: CPTII,S$GLB,, | Performed by: PHYSICIAN ASSISTANT

## 2023-01-13 PROCEDURE — 99999 PR PBB SHADOW E&M-EST. PATIENT-LVL III: ICD-10-PCS | Mod: PBBFAC,,, | Performed by: PHYSICIAN ASSISTANT

## 2023-01-13 PROCEDURE — 1160F RVW MEDS BY RX/DR IN RCRD: CPT | Mod: CPTII,S$GLB,, | Performed by: PHYSICIAN ASSISTANT

## 2023-01-13 PROCEDURE — 3008F PR BODY MASS INDEX (BMI) DOCUMENTED: ICD-10-PCS | Mod: CPTII,S$GLB,, | Performed by: PHYSICIAN ASSISTANT

## 2023-01-13 PROCEDURE — 3074F PR MOST RECENT SYSTOLIC BLOOD PRESSURE < 130 MM HG: ICD-10-PCS | Mod: CPTII,S$GLB,, | Performed by: PHYSICIAN ASSISTANT

## 2023-01-13 PROCEDURE — 99999 PR PBB SHADOW E&M-EST. PATIENT-LVL III: CPT | Mod: PBBFAC,,, | Performed by: PHYSICIAN ASSISTANT

## 2023-01-13 PROCEDURE — 3078F DIAST BP <80 MM HG: CPT | Mod: CPTII,S$GLB,, | Performed by: PHYSICIAN ASSISTANT

## 2023-01-13 PROCEDURE — 1159F MED LIST DOCD IN RCRD: CPT | Mod: CPTII,S$GLB,, | Performed by: PHYSICIAN ASSISTANT

## 2023-01-13 PROCEDURE — 3078F PR MOST RECENT DIASTOLIC BLOOD PRESSURE < 80 MM HG: ICD-10-PCS | Mod: CPTII,S$GLB,, | Performed by: PHYSICIAN ASSISTANT

## 2023-01-13 PROCEDURE — 1160F PR REVIEW ALL MEDS BY PRESCRIBER/CLIN PHARMACIST DOCUMENTED: ICD-10-PCS | Mod: CPTII,S$GLB,, | Performed by: PHYSICIAN ASSISTANT

## 2023-01-13 PROCEDURE — 1159F PR MEDICATION LIST DOCUMENTED IN MEDICAL RECORD: ICD-10-PCS | Mod: CPTII,S$GLB,, | Performed by: PHYSICIAN ASSISTANT

## 2023-01-13 PROCEDURE — 99214 OFFICE O/P EST MOD 30 MIN: CPT | Mod: S$GLB,,, | Performed by: PHYSICIAN ASSISTANT

## 2023-01-13 RX ORDER — HYDROCODONE BITARTRATE AND ACETAMINOPHEN 5; 325 MG/1; MG/1
1 TABLET ORAL EVERY 6 HOURS PRN
Qty: 21 TABLET | Refills: 0 | Status: SHIPPED | OUTPATIENT
Start: 2023-01-13 | End: 2023-03-13 | Stop reason: SDUPTHER

## 2023-01-13 RX ORDER — GABAPENTIN 300 MG/1
CAPSULE ORAL
Qty: 90 CAPSULE | Refills: 1 | Status: SHIPPED | OUTPATIENT
Start: 2023-01-13 | End: 2023-03-07 | Stop reason: SDUPTHER

## 2023-01-13 NOTE — PROGRESS NOTES
Est Patient Chronic Pain Note (Follow up Visit)    Referring Physician: No ref. provider found    PCP: Genevieve Mclaughlin MD    Chief Complaint:   No chief complaint on file.       SUBJECTIVE:  Interval History (1/13/2023):  Kendrick Nava presents today for follow-up visit.  Patient was last seen on Visit date not found. Patient reports pain as 5/10 today.  He has been performing physician directed exercises targeting sciatic nerve pain for several weeks without improvement. He reports continued pain in his thoracolumbar region but his worst pain is persistently along his lumbosacral region in a band-like distribution with radiation into his right lower extremity along L5/S1 distribution. Reports occasional radiation into his left leg. He installs italo for a living and reports prolonged bending and stooping worsens his symptoms. He reports by the end of the day he walks with a limp.    CT lumbar spine 12/02/2022     FINDINGS:  No acute fracture or dislocation.  Moderate disc height loss T11-12 and T12-L1.  Mild disc height loss at L1-2 through L4-5.  Mild facet arthropathy.  No significant endplate sclerosis.  Mild Schmorl's node formation T12-L1 and L1-2.     SI joints unremarkable.  Mild aortoiliac atherosclerotic calcification.     Impression:     No acute abnormality identified.  Chronic mild discogenic degenerative change as described above    CT lumbar spine 07/22/2018  1.  Acute, traumatic fractures involving the left L2, left L3, and left L4 transverse processes.     2.  Multilevel lumbar degenerative disc disease, spondylosis, and stenoses, as discussed above. This is probably most prominent at L5-S1 on the right, with there is mass effect on and posterior displacement of the right S1 nerve root within the lateral recess, with right lateral recess stenosis.    Initial HPI 08/29/2022  Kendrick Nava is a 47 y.o. male who presents to the clinic for the evaluation of back pain.  He was referred by  his primary care provider for further evaluation and management of this pain.  The pain started a few years ago following a fall from a ladder in 2018 and symptoms have been unchanged.The pain is located in the right thoracolumbar area and radiates to the lumbosacral region with occasional radiation into the left lower extremity extending to the foot and ankle.  The pain is described as  aching, numbness, tingling burning  and is rated as 6/10. The pain is rated with a score of  4/10 on the BEST day and a score of 8/10 on the WORST day.  Symptoms interfere with daily activity. The pain is exacerbated by work activities, prolonged standing or sitting.  The pain is mitigated by activity modification.     Patient denies night fever/night sweats, urinary incontinence, bowel incontinence, significant weight loss, significant motor weakness, and loss of sensations.    Pain Disability Index Review:     Last 3 PDI Scores 1/13/2023 8/29/2022   Pain Disability Index (PDI) 50 48       Non-Pharmacologic Treatments:  Physical Therapy/Home Exercise: yes  Ice/Heat:yes  TENS: no  Acupuncture: no  Massage: yes  Chiropractic: yes    Other: no      Pain Medications:  - Opioids:  Tramadol  - Adjuvant Medications:  Mobic  - Anti-Coagulants:  None     report:  Reviewed and consistent with medication use as prescribed.      Pain Procedures:   Denies      Imaging:   Outside CT and MRI of the thoracic lumbar spine reviewed 2018    X-ray lumbar spine 12/02/2021:  The vertebral bodies of the lumbar spine demonstrate a normal height and alignment.  There appears to be mild chronic vertebral body height loss at the T11 and T12 levels with some spondylosis noted at these levels.  The disc space heights appear to be relatively well maintained.  Mild spondylosis noted anteriorly at the L3-4 and to a lesser degree the L4-5 levels.  No pars defects are noted.       Past Medical History:   Diagnosis Date    Diabetes mellitus, type 2      Hyperlipidemia     Testosterone deficiency     LOW Testosterone     History reviewed. No pertinent surgical history.  Social History     Socioeconomic History    Marital status:    Tobacco Use    Smoking status: Some Days     Types: Vaping with nicotine    Smokeless tobacco: Never   Substance and Sexual Activity    Alcohol use: Yes     Alcohol/week: 1.0 standard drink     Types: 1 Shots of liquor per week     Comment: once a month    Drug use: Never    Sexual activity: Yes     Partners: Male     Family History   Problem Relation Age of Onset    Diabetes type II Mother     Diabetes type II Father     Hypertension Father     Diabetes type II Brother     Cancer Maternal Grandmother     Diabetes Maternal Grandfather     No Known Problems Paternal Grandmother     Heart attack Paternal Grandfather        Review of patient's allergies indicates:   Allergen Reactions    Lipitor [atorvastatin]      depression       Current Outpatient Medications   Medication Sig    empagliflozin (JARDIANCE) 25 mg tablet Take 1 tablet (25 mg total) by mouth once daily.    metFORMIN (GLUCOPHAGE-XR) 750 MG ER 24hr tablet Take two tablets with breakfast and one in the evenings.    rosuvastatin (CRESTOR) 20 MG tablet Take 1 tablet (20 mg total) by mouth once daily.    gabapentin (NEURONTIN) 300 MG capsule Take 1 capsule (300 mg total) by mouth every evening for 7 days, THEN 2 capsules (600 mg total) every evening for 7 days, THEN 3 capsules (900 mg total) every evening for 16 days.    HYDROcodone-acetaminophen (NORCO) 5-325 mg per tablet Take 1 tablet by mouth every 6 (six) hours as needed for Pain.     Current Facility-Administered Medications   Medication    testosterone cypionate injection 300 mg       Review of Systems     GENERAL:  No weight loss, malaise or fevers.  HEENT:   No recent changes in vision or hearing  NECK:  Negative for lumps, no difficulty with swallowing.  RESPIRATORY:  Negative for cough, wheezing or shortness of  "breath, patient denies any recent URI.  CARDIOVASCULAR:  Negative for chest pain, leg swelling or palpitations.  GI:  Negative for abdominal discomfort, blood in stools or black stools or change in bowel habits.  MUSCULOSKELETAL:  See HPI.  SKIN:  Negative for lesions, rash, and itching.  PSYCH:  No mood disorder or recent psychosocial stressors.  Patients sleep is not disturbed secondary to pain.  HEMATOLOGY/LYMPHOLOGY:  Negative for prolonged bleeding, bruising easily or swollen nodes.  Patient is not currently taking any anti-coagulants  NEURO:   No history of headaches, syncope, paralysis, seizures or tremors.  All other reviewed and negative other than HPI.    OBJECTIVE:    /63   Pulse 75   Resp 17   Ht 5' 11" (1.803 m)   Wt 117.1 kg (258 lb 2.5 oz)   BMI 36.01 kg/m²         Physical Exam    GENERAL: Well appearing, in no acute distress, alert and oriented x3.  PSYCH:  Mood and affect appropriate.  SKIN: Skin color, texture, turgor normal, no rashes or lesions.  HEAD/FACE:  Normocephalic, atraumatic. Cranial nerves grossly intact.  CV: RRR with palpation of the radial artery.  PULM: No evidence of respiratory difficulty, symmetric chest rise.  GI:  Soft and non-tender.  BACK: Straight leg raising in the sitting and supine positions is positive to radicular pain on the right, equivocal to radicular pain on the left.  Moderate pain to palpation over the facet joints of the lumbar spine and lumbar paraspinals, mostly on the left   Limited range of motion secondary to pain reproduction.  EXTREMITIES: Peripheral joint ROM is full and pain free without obvious instability or laxity in all four extremities. No deformities, edema, or skin discoloration. Good capillary refill.  MUSCULOSKELETAL:  Hip and knee provocative maneuvers are negative.  There is no pain with palpation over the sacroiliac joints bilaterally.  FABERs test is negative.  FADIRs test is negative.   Bilateral upper and lower extremity " strength is normal and symmetric.  No atrophy or tone abnormalities are noted.  NEURO: Bilateral upper and lower extremity coordination and muscle stretch reflexes are physiologic and symmetric.  Plantar response are downgoing. No clonus.  No loss of sensation is noted.  GAIT: normal.      LABS:  Lab Results   Component Value Date    WBC 7.52 12/02/2021    HGB 14.6 12/02/2021    HCT 43.5 12/02/2021    MCV 82 12/02/2021     12/02/2021       CMP  Sodium   Date Value Ref Range Status   06/06/2022 138 136 - 145 mmol/L Final     Potassium   Date Value Ref Range Status   06/06/2022 4.4 3.5 - 5.1 mmol/L Final     Chloride   Date Value Ref Range Status   06/06/2022 101 95 - 110 mmol/L Final     CO2   Date Value Ref Range Status   06/06/2022 27 23 - 29 mmol/L Final     Glucose   Date Value Ref Range Status   06/06/2022 192 (H) 70 - 110 mg/dL Final     BUN   Date Value Ref Range Status   06/06/2022 12 6 - 20 mg/dL Final     Creatinine   Date Value Ref Range Status   06/06/2022 0.9 0.5 - 1.4 mg/dL Final     Calcium   Date Value Ref Range Status   06/06/2022 9.0 8.7 - 10.5 mg/dL Final     Total Protein   Date Value Ref Range Status   06/06/2022 6.9 6.0 - 8.4 g/dL Final     Albumin   Date Value Ref Range Status   11/04/2022 4.8 3.6 - 5.1 g/dL Final     Total Bilirubin   Date Value Ref Range Status   06/06/2022 0.6 0.1 - 1.0 mg/dL Final     Comment:     For infants and newborns, interpretation of results should be based  on gestational age, weight and in agreement with clinical  observations.    Premature Infant recommended reference ranges:  Up to 24 hours.............<8.0 mg/dL  Up to 48 hours............<12.0 mg/dL  3-5 days..................<15.0 mg/dL  6-29 days.................<15.0 mg/dL       Alkaline Phosphatase   Date Value Ref Range Status   06/06/2022 56 55 - 135 U/L Final     AST   Date Value Ref Range Status   06/06/2022 18 10 - 40 U/L Final     ALT   Date Value Ref Range Status   06/06/2022 25 10 - 44 U/L  Final     Anion Gap   Date Value Ref Range Status   06/06/2022 10 8 - 16 mmol/L Final     eGFR if    Date Value Ref Range Status   06/06/2022 >60.0 >60 mL/min/1.73 m^2 Final     eGFR if non    Date Value Ref Range Status   06/06/2022 >60.0 >60 mL/min/1.73 m^2 Final     Comment:     Calculation used to obtain the estimated glomerular filtration  rate (eGFR) is the CKD-EPI equation.          Lab Results   Component Value Date    HGBA1C 8.0 (H) 11/04/2022             ASSESSMENT: 47 y.o. year old male with chronic lower back pain, consistent with     1. Lumbar radiculopathy  gabapentin (NEURONTIN) 300 MG capsule    IR JONATAN Lumbar w/ Img    Case Request-RAD/Other Procedure Area: Lumbar L5/S1 IL JONATAN with right paramedian approach RN IV Sedation              PLAN:   - Interventions: Schedule for lumbar L5/S1 IL JONATAN with right paramedian approach to see if this offers relief from lumbar radiculopathy,     - Anticoagulation use: no       - Medications: I have stressed the importance of physical activity and a home exercise plan to help with pain and improve health. and Patient can continue with medications for now since they are providing benefits, using them appropriately, and without side effects.     Provide short course of Norco 5/325 mg Q 8 p.r.n., 21 tablets, 0 refills.  I reviewed the  and is consistent patient's history.  - Will start gabapentin 900mg/day with titration instructions. Start with 1 capsule at bedtime for 7 days, then increase to 2 capsules at bedtime for 7 days, then increase the dose to 3 capsules at night.      - Therapy:  Advised patient continue with activities as tolerated    - Psychological:  Discussed coping mechanisms to help address chronic pain issues    - Labs:  Reviewed    - Imaging: Reviewed available imaging with patient and answered any questions they had regarding study.      - Consults/Referrals:  None at this time    - Records:  Reviewed/Obtain old  records from outside physicians and imaging    - Follow up visit: 4 weeks after injection    - Counseled patient regarding the importance of activity modification and physical therapy    - This condition does not require this patient to take time off of work, and the primary goal of our Pain Management services is to improve the patient's functional capacity.    - Patient Questions: Answered all of the patient's questions regarding diagnosis, therapy, and treatment        The above plan and management options were discussed at length with patient. Patient is in agreement with the above and verbalized understanding.    I discussed the goals of interventional chronic pain management with the patient on today's visit.  I explained the utility of injections for diagnostic and therapeutic purposes.  We discussed a multimodal approach to pain including treating the patient's given worst pain at any given time.  We will use a systematic approach to addressing pain.  We will also adopt a multimodal approach that includes injections, adjuvant medications, physical therapy, at times psychiatry.  There may be a limited role for opioid use intermittently in the treatment of pain, more particularly for acute pain although no one approach can be used as a sole treatment modality.    I emphasized the importance of regular exercise, core strengthening and stretching, diet and weight loss as a cornerstone of long-term pain management.      Eduarda Tsang PA-C  Interventional Pain Management  [facility]    Disclaimer:  This note was prepared using voice recognition system and is likely to have sound alike errors that may have been overlooked even after proof reading.  Please call me with any questions

## 2023-01-18 ENCOUNTER — CLINICAL SUPPORT (OUTPATIENT)
Dept: INTERNAL MEDICINE | Facility: CLINIC | Age: 48
End: 2023-01-18
Payer: COMMERCIAL

## 2023-01-18 PROCEDURE — 96372 PR INJECTION,THERAP/PROPH/DIAG2ST, IM OR SUBCUT: ICD-10-PCS | Mod: S$GLB,,, | Performed by: FAMILY MEDICINE

## 2023-01-18 PROCEDURE — 99999 PR PBB SHADOW E&M-EST. PATIENT-LVL II: CPT | Mod: PBBFAC,,,

## 2023-01-18 PROCEDURE — 99999 PR PBB SHADOW E&M-EST. PATIENT-LVL II: ICD-10-PCS | Mod: PBBFAC,,,

## 2023-01-18 PROCEDURE — 96372 THER/PROPH/DIAG INJ SC/IM: CPT | Mod: S$GLB,,, | Performed by: FAMILY MEDICINE

## 2023-01-18 RX ADMIN — TESTOSTERONE CYPIONATE 300 MG: 200 INJECTION, SOLUTION INTRAMUSCULAR at 03:01

## 2023-01-18 NOTE — PROGRESS NOTES
Depotestosterone 300 mg ( 1.5 ml ) given IM in left ventral gluteus   Lot #: [id number]   Exp: 02/28/25   NDC #: 0755-7336-04  Manufactory: Pfiter     Pt waited 15 minutes without a reaction notices

## 2023-01-20 ENCOUNTER — PATIENT MESSAGE (OUTPATIENT)
Dept: PAIN MEDICINE | Facility: CLINIC | Age: 48
End: 2023-01-20
Payer: COMMERCIAL

## 2023-01-20 DIAGNOSIS — M54.9 DORSALGIA, UNSPECIFIED: Primary | ICD-10-CM

## 2023-01-20 RX ORDER — CYCLOBENZAPRINE HCL 10 MG
10 TABLET ORAL NIGHTLY
Qty: 30 TABLET | Refills: 1 | Status: SHIPPED | OUTPATIENT
Start: 2023-01-20 | End: 2023-02-20

## 2023-01-20 RX ORDER — KETOROLAC TROMETHAMINE 10 MG/1
10 TABLET, FILM COATED ORAL EVERY 6 HOURS
Qty: 20 TABLET | Refills: 0 | Status: SHIPPED | OUTPATIENT
Start: 2023-01-20 | End: 2023-01-25

## 2023-01-26 ENCOUNTER — PATIENT MESSAGE (OUTPATIENT)
Dept: PAIN MEDICINE | Facility: HOSPITAL | Age: 48
End: 2023-01-26
Payer: COMMERCIAL

## 2023-01-26 NOTE — PRE-PROCEDURE INSTRUCTIONS
Spoke with patient regarding procedure scheduled on 2.2    Arrival time 0715    Has patient been sick with fever or on antibiotics within the last 7 days? No    Does the patient have any open wounds, sores or rashes? No    Does the patient have any recent fractures? no    Has patient received a vaccination within the last 7 days? No    Received the COVID vaccination? yes    Has the patient stopped all medications as directed? HOLD DM MEDS AM OF PROCEDURE    Does patient have a pacemaker and or defibrillator? no    Does the patient have a ride to and from procedure and someone reliable to remain with patient? WIFE     Is the patient diabetic? YES    Does the patient have sleep apnea? Or use O2 at home? No and no     Is the patient receiving sedation? yes    Is the patient instructed to remain NPO beginning at midnight the night before their procedure? yes    Procedure location confirmed with patient? Yes    Covid- Denies signs/symptoms. Instructed to notify PAT/MD if any changes.

## 2023-02-02 ENCOUNTER — PATIENT MESSAGE (OUTPATIENT)
Dept: INTERNAL MEDICINE | Facility: CLINIC | Age: 48
End: 2023-02-02

## 2023-02-02 ENCOUNTER — HOSPITAL ENCOUNTER (OUTPATIENT)
Facility: HOSPITAL | Age: 48
Discharge: HOME | End: 2023-02-02
Attending: PHYSICAL MEDICINE & REHABILITATION | Admitting: PHYSICAL MEDICINE & REHABILITATION
Payer: COMMERCIAL

## 2023-02-02 ENCOUNTER — CLINICAL SUPPORT (OUTPATIENT)
Dept: INTERNAL MEDICINE | Facility: CLINIC | Age: 48
End: 2023-02-02
Payer: COMMERCIAL

## 2023-02-02 VITALS
WEIGHT: 256.5 LBS | HEIGHT: 71 IN | DIASTOLIC BLOOD PRESSURE: 72 MMHG | HEART RATE: 75 BPM | SYSTOLIC BLOOD PRESSURE: 133 MMHG | BODY MASS INDEX: 35.91 KG/M2 | TEMPERATURE: 97 F | RESPIRATION RATE: 16 BRPM | OXYGEN SATURATION: 100 %

## 2023-02-02 DIAGNOSIS — M54.16 LUMBAR RADICULOPATHY: Primary | ICD-10-CM

## 2023-02-02 LAB — POCT GLUCOSE: 151 MG/DL (ref 70–110)

## 2023-02-02 PROCEDURE — 96372 THER/PROPH/DIAG INJ SC/IM: CPT | Mod: S$GLB,,, | Performed by: FAMILY MEDICINE

## 2023-02-02 PROCEDURE — 62323 PR INJ LUMBAR/SACRAL, W/IMAGING GUIDANCE: ICD-10-PCS | Mod: ,,, | Performed by: PHYSICAL MEDICINE & REHABILITATION

## 2023-02-02 PROCEDURE — 99999 PR PBB SHADOW E&M-EST. PATIENT-LVL II: ICD-10-PCS | Mod: PBBFAC,,,

## 2023-02-02 PROCEDURE — 25500020 PHARM REV CODE 255: Performed by: PHYSICAL MEDICINE & REHABILITATION

## 2023-02-02 PROCEDURE — 99999 PR PBB SHADOW E&M-EST. PATIENT-LVL II: CPT | Mod: PBBFAC,,,

## 2023-02-02 PROCEDURE — 62323 NJX INTERLAMINAR LMBR/SAC: CPT | Performed by: PHYSICAL MEDICINE & REHABILITATION

## 2023-02-02 PROCEDURE — 62323 NJX INTERLAMINAR LMBR/SAC: CPT | Mod: ,,, | Performed by: PHYSICAL MEDICINE & REHABILITATION

## 2023-02-02 PROCEDURE — 63600175 PHARM REV CODE 636 W HCPCS: Performed by: PHYSICAL MEDICINE & REHABILITATION

## 2023-02-02 PROCEDURE — 96372 PR INJECTION,THERAP/PROPH/DIAG2ST, IM OR SUBCUT: ICD-10-PCS | Mod: S$GLB,,, | Performed by: FAMILY MEDICINE

## 2023-02-02 PROCEDURE — 25000003 PHARM REV CODE 250: Performed by: PHYSICAL MEDICINE & REHABILITATION

## 2023-02-02 RX ORDER — ONDANSETRON 2 MG/ML
4 INJECTION INTRAMUSCULAR; INTRAVENOUS ONCE AS NEEDED
Status: DISCONTINUED | OUTPATIENT
Start: 2023-02-02 | End: 2023-02-02 | Stop reason: HOSPADM

## 2023-02-02 RX ORDER — METHYLPREDNISOLONE ACETATE 40 MG/ML
INJECTION, SUSPENSION INTRA-ARTICULAR; INTRALESIONAL; INTRAMUSCULAR; SOFT TISSUE
Status: DISCONTINUED | OUTPATIENT
Start: 2023-02-02 | End: 2023-02-02 | Stop reason: HOSPADM

## 2023-02-02 RX ORDER — FENTANYL CITRATE 50 UG/ML
INJECTION, SOLUTION INTRAMUSCULAR; INTRAVENOUS
Status: DISCONTINUED | OUTPATIENT
Start: 2023-02-02 | End: 2023-02-02 | Stop reason: HOSPADM

## 2023-02-02 RX ORDER — MIDAZOLAM HYDROCHLORIDE 1 MG/ML
INJECTION, SOLUTION INTRAMUSCULAR; INTRAVENOUS
Status: DISCONTINUED | OUTPATIENT
Start: 2023-02-02 | End: 2023-02-02 | Stop reason: HOSPADM

## 2023-02-02 RX ORDER — BUPIVACAINE HYDROCHLORIDE 2.5 MG/ML
INJECTION, SOLUTION EPIDURAL; INFILTRATION; INTRACAUDAL
Status: DISCONTINUED | OUTPATIENT
Start: 2023-02-02 | End: 2023-02-02 | Stop reason: HOSPADM

## 2023-02-02 RX ADMIN — TESTOSTERONE CYPIONATE 300 MG: 200 INJECTION, SOLUTION INTRAMUSCULAR at 10:02

## 2023-02-02 NOTE — DISCHARGE SUMMARY
Discharge Note  Short Stay      SUMMARY     Admit Date: 2/2/2023    Attending Physician: Nico Angeles MD        Discharge Physician: Nico Angeles MD        Discharge Date: 2/2/2023 8:00 AM    Procedure(s) (LRB):  Lumbar L5/S1 IL JONATAN with right paramedian approach RN IV Sedation (N/A)    Final Diagnosis: Lumbar radiculopathy [M54.16]    Disposition: Home or self care    Patient Instructions:   Current Discharge Medication List        CONTINUE these medications which have NOT CHANGED    Details   cyclobenzaprine (FLEXERIL) 10 MG tablet Take 1 tablet (10 mg total) by mouth every evening. May cause drowsiness.  Qty: 30 tablet, Refills: 1    Associated Diagnoses: Dorsalgia, unspecified      empagliflozin (JARDIANCE) 25 mg tablet Take 1 tablet (25 mg total) by mouth once daily.  Qty: 30 tablet, Refills: 11      gabapentin (NEURONTIN) 300 MG capsule Take 1 capsule (300 mg total) by mouth every evening for 7 days, THEN 2 capsules (600 mg total) every evening for 7 days, THEN 3 capsules (900 mg total) every evening for 16 days.  Qty: 90 capsule, Refills: 1    Associated Diagnoses: Lumbar radiculopathy      HYDROcodone-acetaminophen (NORCO) 5-325 mg per tablet Take 1 tablet by mouth every 6 (six) hours as needed for Pain.  Qty: 21 tablet, Refills: 0    Comments: Quantity prescribed more than 7 day supply? No      metFORMIN (GLUCOPHAGE-XR) 750 MG ER 24hr tablet Take two tablets with breakfast and one in the evenings.  Qty: 90 tablet, Refills: 11      rosuvastatin (CRESTOR) 20 MG tablet Take 1 tablet (20 mg total) by mouth once daily.  Qty: 90 tablet, Refills: 3                 Discharge Diagnosis: Lumbar radiculopathy [M54.16]  Condition on Discharge: Stable with no complications to procedure   Diet on Discharge: Same as before.  Activity: as per instruction sheet.  Discharge to: Home with a responsible adult.  Follow up: 2-4 weeks       Please call the office at [phone] if you experience any weakness or loss  of sensation, fever > 101.5, pain uncontrolled with oral medications, persistent nausea/vomiting/or diarrhea, redness or drainage from the incisions, or any other worrisome concerns. If physician on call was not reached or could not communicate with our office for any reason please go to the nearest emergency department

## 2023-02-02 NOTE — OP NOTE
Lumbar Interlaminar Epidural Steroid Injection under Fluoroscopic Guidance.   Time-out taken to identify patient and procedure prior to starting the procedure.     02/02/2023    PROCEDURE: Interlaminar epidural steroid injection under fluoroscopic guidance.     Pre-Op diagnosis: Lumbar radiculopathy [M54.16]    Post-Op diagnosis: Lumbar radiculopathy [M54.16]    PHYSICIAN: Nico Angeles MD    ASSISTANTS: None     SEDATION: Conscious sedation provided by M.D    The patient was monitored with continuous pulse oximetry, EKG, and intermittent blood pressure monitors, immediately prior to administration of sedation.  The patient was hemodynamically stable throughout the entire process was responsive to voice, and breathing spontaneously.  Supplemental O2 was provided at 2L/min via nasal cannula.  Patient was comfortable for the duration of the procedure.     There was a total of 2mg IV Midazolam and 75mcg Fentanyl titrated for the procedure    Total sedation time was <10 minutes      ESTIMATED BLOOD LOSS: none.     COMPLICATIONS: none.     SPECIMENS: none    TECHNIQUE: With the patient laying in a prone position, the area was prepped and draped in the usual sterile fashion using ChloraPrep and a fenestrated drape. 1% lidocaine was given using a 27-gauge needle by raising a wheal and going down to the hub of the needle over the L5/S1 interlaminar space.  The interlaminar space was then approached with a 3.5 inch 18-gauge Touhy needle was introduced under fluoroscopic guidance in the AP and Lateral view. Once the Ligamentum flavum was encountered loss of resistance to saline was used to enter the epidural space. With positive loss of resistance and negative CSF or Blood, 3mL contrast dye Omnipaque (300mg/ml) was injected to confirm placement and there was no vascular runoff. Then 1ml 40mg/ml Depomedrol + 1mL 0.25% Bupivicaine + 8mL preservative free normal saline was injected slowly. Displacement of the radio opaque  contrast after injection of the medication confirmed that the medication went into the area of the epidural space.  The patient tolerated the procedure well.       The patient was monitored after the procedure.   They were given post-procedure and discharge instructions to follow at home.  The patient was discharged in a stable condition.

## 2023-02-02 NOTE — DISCHARGE INSTRUCTIONS

## 2023-02-02 NOTE — H&P
"HPI  Patient presenting for Procedure(s) (LRB):  Lumbar L5/S1 IL JONATAN with right paramedian approach RN IV Sedation (N/A)     Patient on Anti-coagulation No    No health changes since previous encounter    Past Medical History:   Diagnosis Date    Diabetes mellitus, type 2     Hyperlipidemia     Testosterone deficiency     LOW Testosterone     History reviewed. No pertinent surgical history.  Review of patient's allergies indicates:   Allergen Reactions    Lipitor [atorvastatin]      depression        No current facility-administered medications on file prior to encounter.     Current Outpatient Medications on File Prior to Encounter   Medication Sig Dispense Refill    empagliflozin (JARDIANCE) 25 mg tablet Take 1 tablet (25 mg total) by mouth once daily. 30 tablet 11    gabapentin (NEURONTIN) 300 MG capsule Take 1 capsule (300 mg total) by mouth every evening for 7 days, THEN 2 capsules (600 mg total) every evening for 7 days, THEN 3 capsules (900 mg total) every evening for 16 days. 90 capsule 1    HYDROcodone-acetaminophen (NORCO) 5-325 mg per tablet Take 1 tablet by mouth every 6 (six) hours as needed for Pain. 21 tablet 0    metFORMIN (GLUCOPHAGE-XR) 750 MG ER 24hr tablet Take two tablets with breakfast and one in the evenings. 90 tablet 11    rosuvastatin (CRESTOR) 20 MG tablet Take 1 tablet (20 mg total) by mouth once daily. 90 tablet 3        PMHx, PSHx, Allergies, Medications reviewed in epic    ROS negative except pain complaints in HPI    OBJECTIVE:    /66 (BP Location: Right arm, Patient Position: Sitting)   Pulse 87   Temp 97.6 °F (36.4 °C) (Temporal)   Resp 16   Ht 5' 11" (1.803 m)   Wt 116.3 kg (256 lb 8.1 oz)   SpO2 99%   BMI 35.78 kg/m²     PHYSICAL EXAMINATION:    GENERAL: Well appearing, in no acute distress, alert and oriented x3.  PSYCH:  Mood and affect appropriate.  SKIN: Skin color, texture, turgor normal, no rashes or lesions which will impact the procedure.  CV: RRR with " palpation of the radial artery.  PULM: No evidence of respiratory difficulty, symmetric chest rise. Clear to auscultation.  NEURO: Cranial nerves grossly intact.    Plan:    Proceed with procedure as planned Procedure(s) (LRB):  Lumbar L5/S1 IL JONATAN with right paramedian approach RN IV Sedation (N/A)    Nico Angeles MD  02/02/2023

## 2023-02-13 ENCOUNTER — PATIENT OUTREACH (OUTPATIENT)
Dept: ADMINISTRATIVE | Facility: HOSPITAL | Age: 48
End: 2023-02-13
Payer: COMMERCIAL

## 2023-02-16 ENCOUNTER — LAB VISIT (OUTPATIENT)
Dept: LAB | Facility: HOSPITAL | Age: 48
End: 2023-02-16
Attending: FAMILY MEDICINE
Payer: COMMERCIAL

## 2023-02-16 ENCOUNTER — CLINICAL SUPPORT (OUTPATIENT)
Dept: INTERNAL MEDICINE | Facility: CLINIC | Age: 48
End: 2023-02-16
Payer: COMMERCIAL

## 2023-02-16 DIAGNOSIS — E11.9 TYPE 2 DIABETES MELLITUS WITHOUT COMPLICATION, WITHOUT LONG-TERM CURRENT USE OF INSULIN: ICD-10-CM

## 2023-02-16 LAB
ALBUMIN SERPL BCP-MCNC: 4.3 G/DL (ref 3.5–5.2)
ALP SERPL-CCNC: 61 U/L (ref 55–135)
ALT SERPL W/O P-5'-P-CCNC: 27 U/L (ref 10–44)
ANION GAP SERPL CALC-SCNC: 12 MMOL/L (ref 8–16)
AST SERPL-CCNC: 26 U/L (ref 10–40)
BASOPHILS # BLD AUTO: 0.05 K/UL (ref 0–0.2)
BASOPHILS NFR BLD: 0.6 % (ref 0–1.9)
BILIRUB SERPL-MCNC: 0.5 MG/DL (ref 0.1–1)
BUN SERPL-MCNC: 10 MG/DL (ref 6–20)
CALCIUM SERPL-MCNC: 9.4 MG/DL (ref 8.7–10.5)
CHLORIDE SERPL-SCNC: 103 MMOL/L (ref 95–110)
CHOLEST SERPL-MCNC: 140 MG/DL (ref 120–199)
CHOLEST/HDLC SERPL: 3.5 {RATIO} (ref 2–5)
CO2 SERPL-SCNC: 23 MMOL/L (ref 23–29)
CREAT SERPL-MCNC: 0.9 MG/DL (ref 0.5–1.4)
DIFFERENTIAL METHOD: NORMAL
EOSINOPHIL # BLD AUTO: 0.4 K/UL (ref 0–0.5)
EOSINOPHIL NFR BLD: 4.6 % (ref 0–8)
ERYTHROCYTE [DISTWIDTH] IN BLOOD BY AUTOMATED COUNT: 13.2 % (ref 11.5–14.5)
EST. GFR  (NO RACE VARIABLE): >60 ML/MIN/1.73 M^2
GLUCOSE SERPL-MCNC: 166 MG/DL (ref 70–110)
HCT VFR BLD AUTO: 45.5 % (ref 40–54)
HDLC SERPL-MCNC: 40 MG/DL (ref 40–75)
HDLC SERPL: 28.6 % (ref 20–50)
HGB BLD-MCNC: 14.9 G/DL (ref 14–18)
IMM GRANULOCYTES # BLD AUTO: 0.02 K/UL (ref 0–0.04)
IMM GRANULOCYTES NFR BLD AUTO: 0.3 % (ref 0–0.5)
LDLC SERPL CALC-MCNC: 60.8 MG/DL (ref 63–159)
LYMPHOCYTES # BLD AUTO: 2.5 K/UL (ref 1–4.8)
LYMPHOCYTES NFR BLD: 32.3 % (ref 18–48)
MCH RBC QN AUTO: 28 PG (ref 27–31)
MCHC RBC AUTO-ENTMCNC: 32.7 G/DL (ref 32–36)
MCV RBC AUTO: 86 FL (ref 82–98)
MONOCYTES # BLD AUTO: 0.4 K/UL (ref 0.3–1)
MONOCYTES NFR BLD: 4.6 % (ref 4–15)
NEUTROPHILS # BLD AUTO: 4.5 K/UL (ref 1.8–7.7)
NEUTROPHILS NFR BLD: 57.6 % (ref 38–73)
NONHDLC SERPL-MCNC: 100 MG/DL
NRBC BLD-RTO: 0 /100 WBC
PLATELET # BLD AUTO: 249 K/UL (ref 150–450)
PMV BLD AUTO: 11.1 FL (ref 9.2–12.9)
POTASSIUM SERPL-SCNC: 4.3 MMOL/L (ref 3.5–5.1)
PROT SERPL-MCNC: 7.2 G/DL (ref 6–8.4)
RBC # BLD AUTO: 5.32 M/UL (ref 4.6–6.2)
SODIUM SERPL-SCNC: 138 MMOL/L (ref 136–145)
TRIGL SERPL-MCNC: 196 MG/DL (ref 30–150)
WBC # BLD AUTO: 7.86 K/UL (ref 3.9–12.7)

## 2023-02-16 PROCEDURE — 83036 HEMOGLOBIN GLYCOSYLATED A1C: CPT | Performed by: FAMILY MEDICINE

## 2023-02-16 PROCEDURE — 80061 LIPID PANEL: CPT | Performed by: FAMILY MEDICINE

## 2023-02-16 PROCEDURE — 85025 COMPLETE CBC W/AUTO DIFF WBC: CPT | Performed by: FAMILY MEDICINE

## 2023-02-16 PROCEDURE — 96372 PR INJECTION,THERAP/PROPH/DIAG2ST, IM OR SUBCUT: ICD-10-PCS | Mod: S$GLB,,, | Performed by: FAMILY MEDICINE

## 2023-02-16 PROCEDURE — 96372 THER/PROPH/DIAG INJ SC/IM: CPT | Mod: S$GLB,,, | Performed by: FAMILY MEDICINE

## 2023-02-16 PROCEDURE — 80053 COMPREHEN METABOLIC PANEL: CPT | Performed by: FAMILY MEDICINE

## 2023-02-16 PROCEDURE — 99999 PR PBB SHADOW E&M-EST. PATIENT-LVL II: ICD-10-PCS | Mod: PBBFAC,,,

## 2023-02-16 PROCEDURE — 36415 COLL VENOUS BLD VENIPUNCTURE: CPT | Mod: PO | Performed by: FAMILY MEDICINE

## 2023-02-16 PROCEDURE — 99999 PR PBB SHADOW E&M-EST. PATIENT-LVL II: CPT | Mod: PBBFAC,,,

## 2023-02-16 RX ADMIN — TESTOSTERONE CYPIONATE 300 MG: 200 INJECTION, SOLUTION INTRAMUSCULAR at 02:02

## 2023-02-16 NOTE — PROGRESS NOTES
Depotestosterone 300 mg ( 1.5 ml ) given IM in left ventral gluteus  Lot #: [id number]   Exp: 02/30/25    NDC #: 0545-4546-20  Manufactory: Pf[employer]      Pt waited 15 minutes without a reaction notices

## 2023-02-17 LAB
ESTIMATED AVG GLUCOSE: 174 MG/DL (ref 68–131)
HBA1C MFR BLD: 7.7 % (ref 4–5.6)

## 2023-02-20 ENCOUNTER — OFFICE VISIT (OUTPATIENT)
Dept: INTERNAL MEDICINE | Facility: CLINIC | Age: 48
End: 2023-02-20
Payer: COMMERCIAL

## 2023-02-20 VITALS
DIASTOLIC BLOOD PRESSURE: 78 MMHG | TEMPERATURE: 98 F | HEART RATE: 98 BPM | BODY MASS INDEX: 35.95 KG/M2 | OXYGEN SATURATION: 98 % | SYSTOLIC BLOOD PRESSURE: 136 MMHG | HEIGHT: 71 IN | WEIGHT: 256.81 LBS

## 2023-02-20 DIAGNOSIS — E34.9 TESTOSTERONE DEFICIENCY: ICD-10-CM

## 2023-02-20 DIAGNOSIS — E11.9 TYPE 2 DIABETES MELLITUS WITHOUT COMPLICATION, WITHOUT LONG-TERM CURRENT USE OF INSULIN: Primary | ICD-10-CM

## 2023-02-20 DIAGNOSIS — E66.01 SEVERE OBESITY (BMI 35.0-39.9) WITH COMORBIDITY: ICD-10-CM

## 2023-02-20 DIAGNOSIS — E78.5 HYPERLIPIDEMIA, UNSPECIFIED HYPERLIPIDEMIA TYPE: ICD-10-CM

## 2023-02-20 PROCEDURE — 3075F SYST BP GE 130 - 139MM HG: CPT | Mod: CPTII,S$GLB,, | Performed by: FAMILY MEDICINE

## 2023-02-20 PROCEDURE — 99999 PR PBB SHADOW E&M-EST. PATIENT-LVL III: CPT | Mod: PBBFAC,,, | Performed by: FAMILY MEDICINE

## 2023-02-20 PROCEDURE — 1159F MED LIST DOCD IN RCRD: CPT | Mod: CPTII,S$GLB,, | Performed by: FAMILY MEDICINE

## 2023-02-20 PROCEDURE — 3008F BODY MASS INDEX DOCD: CPT | Mod: CPTII,S$GLB,, | Performed by: FAMILY MEDICINE

## 2023-02-20 PROCEDURE — 99999 PR PBB SHADOW E&M-EST. PATIENT-LVL III: ICD-10-PCS | Mod: PBBFAC,,, | Performed by: FAMILY MEDICINE

## 2023-02-20 PROCEDURE — 99214 PR OFFICE/OUTPT VISIT, EST, LEVL IV, 30-39 MIN: ICD-10-PCS | Mod: S$GLB,,, | Performed by: FAMILY MEDICINE

## 2023-02-20 PROCEDURE — 3078F PR MOST RECENT DIASTOLIC BLOOD PRESSURE < 80 MM HG: ICD-10-PCS | Mod: CPTII,S$GLB,, | Performed by: FAMILY MEDICINE

## 2023-02-20 PROCEDURE — 99214 OFFICE O/P EST MOD 30 MIN: CPT | Mod: S$GLB,,, | Performed by: FAMILY MEDICINE

## 2023-02-20 PROCEDURE — 3075F PR MOST RECENT SYSTOLIC BLOOD PRESS GE 130-139MM HG: ICD-10-PCS | Mod: CPTII,S$GLB,, | Performed by: FAMILY MEDICINE

## 2023-02-20 PROCEDURE — 3051F HG A1C>EQUAL 7.0%<8.0%: CPT | Mod: CPTII,S$GLB,, | Performed by: FAMILY MEDICINE

## 2023-02-20 PROCEDURE — 3051F PR MOST RECENT HEMOGLOBIN A1C LEVEL 7.0 - < 8.0%: ICD-10-PCS | Mod: CPTII,S$GLB,, | Performed by: FAMILY MEDICINE

## 2023-02-20 PROCEDURE — 3008F PR BODY MASS INDEX (BMI) DOCUMENTED: ICD-10-PCS | Mod: CPTII,S$GLB,, | Performed by: FAMILY MEDICINE

## 2023-02-20 PROCEDURE — 3078F DIAST BP <80 MM HG: CPT | Mod: CPTII,S$GLB,, | Performed by: FAMILY MEDICINE

## 2023-02-20 PROCEDURE — 1159F PR MEDICATION LIST DOCUMENTED IN MEDICAL RECORD: ICD-10-PCS | Mod: CPTII,S$GLB,, | Performed by: FAMILY MEDICINE

## 2023-02-22 PROBLEM — E66.01 SEVERE OBESITY (BMI 35.0-39.9) WITH COMORBIDITY: Status: ACTIVE | Noted: 2023-02-22

## 2023-02-22 NOTE — PROGRESS NOTES
Subjective:      Patient ID: Kendrick Nava is a 47 y.o. male.    Chief Complaint: Follow-up      Patient here for routine follow up on diabetes, HTN, hyperlipidemia, hypotestosteronism. Reviewed labs drawn recently today with the patient.   A1c is improved to 707   Lipids at goal.  Blood pressure controlled today.  Kidney function is stable.  No new problems today    Follow-up  Pertinent negatives include no abdominal pain, chest pain or congestion.   Review of Systems   Constitutional:  Negative for activity change and appetite change.   HENT:  Negative for congestion.    Respiratory:  Negative for shortness of breath.    Cardiovascular:  Negative for chest pain and palpitations.   Gastrointestinal:  Negative for abdominal pain.   Past Medical History:   Diagnosis Date    Diabetes mellitus, type 2     Hyperlipidemia     Testosterone deficiency     LOW Testosterone          Past Surgical History:   Procedure Laterality Date    EPIDURAL STEROID INJECTION N/A 2/2/2023    Procedure: Lumbar L5/S1 IL JONATAN with right paramedian approach RN IV Sedation;  Surgeon: Nico Angeles MD;  Location: [facility];  Service: Pain Management;  Laterality: N/A;     Family History   Problem Relation Age of Onset    Diabetes type II Mother     Diabetes type II Father     Hypertension Father     Diabetes type II Brother     Cancer Maternal Grandmother     Diabetes Maternal Grandfather     No Known Problems Paternal Grandmother     Heart attack Paternal Grandfather      Social History     Socioeconomic History    Marital status:    Tobacco Use    Smoking status: Some Days     Types: Vaping with nicotine    Smokeless tobacco: Never   Substance and Sexual Activity    Alcohol use: Yes     Alcohol/week: 1.0 standard drink     Types: 1 Shots of liquor per week     Comment: once a month    Drug use: Never    Sexual activity: Yes     Partners: Male     Review of patient's allergies indicates:   Allergen Reactions    Lipitor  "[atorvastatin]      depression       Objective:       /78 (BP Location: Left arm, Patient Position: Sitting, BP Method: Large (Manual))   Pulse 98   Temp 97.8 °F (36.6 °C) (Tympanic)   Ht 5' 11" (1.803 m)   Wt 116.5 kg (256 lb 13.4 oz)   SpO2 98%   BMI 35.82 kg/m²   Physical Exam  Constitutional:       General: He is not in acute distress.     Appearance: Normal appearance. He is well-developed. He is not ill-appearing or diaphoretic.   Cardiovascular:      Rate and Rhythm: Normal rate and regular rhythm.      Heart sounds: Normal heart sounds.   Pulmonary:      Effort: Pulmonary effort is normal.      Breath sounds: Normal breath sounds.   Musculoskeletal:      Right lower leg: No edema.      Left lower leg: No edema.   Neurological:      General: No focal deficit present.      Mental Status: He is alert and oriented to person, place, and time.   Psychiatric:         Mood and Affect: Mood normal.         Behavior: Behavior normal.         Thought Content: Thought content normal.         Judgment: Judgment normal.   Protective Sensation (w/ 10 gram monofilament):  Right: Intact  Left: Intact    Visual Inspection:  Normal -  Bilateral    Pedal Pulses:   Right: Present  Left: Present    Posterior tibialis:   Right:Present  Left: Present   Assessment:     1. Type 2 diabetes mellitus without complication, without long-term current use of insulin    2. Hyperlipidemia, unspecified hyperlipidemia type    3. Testosterone deficiency    4. Severe obesity (BMI 35.0-39.9) with comorbidity      Plan:   Type 2 diabetes mellitus without complication, without long-term current use of insulin  -     Hemoglobin A1C; Future; Expected date: 05/21/2023  -     Lipid Panel; Future; Expected date: 05/21/2023  -     Comprehensive Metabolic Panel; Future; Expected date: 05/21/2023  -     Testosterone; Future; Expected date: 05/21/2023    Hyperlipidemia, unspecified hyperlipidemia type    Testosterone deficiency    Severe obesity " (BMI 35.0-39.9) with comorbidity    Continue all other current medications.   Above labs in 3 months prior to visit with me.    Medication List with Changes/Refills   Current Medications    EMPAGLIFLOZIN (JARDIANCE) 25 MG TABLET    Take 1 tablet (25 mg total) by mouth once daily.    GABAPENTIN (NEURONTIN) 300 MG CAPSULE    Take 1 capsule (300 mg total) by mouth every evening for 7 days, THEN 2 capsules (600 mg total) every evening for 7 days, THEN 3 capsules (900 mg total) every evening for 16 days.    HYDROCODONE-ACETAMINOPHEN (NORCO) 5-325 MG PER TABLET    Take 1 tablet by mouth every 6 (six) hours as needed for Pain.    METFORMIN (GLUCOPHAGE-XR) 750 MG ER 24HR TABLET    Take two tablets with breakfast and one in the evenings.    ROSUVASTATIN (CRESTOR) 20 MG TABLET    Take 1 tablet (20 mg total) by mouth once daily.

## 2023-03-02 ENCOUNTER — PATIENT MESSAGE (OUTPATIENT)
Dept: INTERNAL MEDICINE | Facility: CLINIC | Age: 48
End: 2023-03-02
Payer: COMMERCIAL

## 2023-03-03 ENCOUNTER — CLINICAL SUPPORT (OUTPATIENT)
Dept: INTERNAL MEDICINE | Facility: CLINIC | Age: 48
End: 2023-03-03
Payer: COMMERCIAL

## 2023-03-03 PROCEDURE — 99999 PR PBB SHADOW E&M-EST. PATIENT-LVL II: ICD-10-PCS | Mod: PBBFAC,,,

## 2023-03-03 PROCEDURE — 96372 THER/PROPH/DIAG INJ SC/IM: CPT | Mod: S$GLB,,, | Performed by: FAMILY MEDICINE

## 2023-03-03 PROCEDURE — 99999 PR PBB SHADOW E&M-EST. PATIENT-LVL II: CPT | Mod: PBBFAC,,,

## 2023-03-03 PROCEDURE — 96372 PR INJECTION,THERAP/PROPH/DIAG2ST, IM OR SUBCUT: ICD-10-PCS | Mod: S$GLB,,, | Performed by: FAMILY MEDICINE

## 2023-03-03 RX ADMIN — TESTOSTERONE CYPIONATE 300 MG: 200 INJECTION, SOLUTION INTRAMUSCULAR at 09:03

## 2023-03-03 NOTE — PROGRESS NOTES
Pt instructed to wait 15 minutes following injection for possible reaction. pt verbalized understanding and tolerated well. Scheduled next nurse visit 14 days from today.

## 2023-03-08 RX ORDER — GABAPENTIN 300 MG/1
900 CAPSULE ORAL NIGHTLY
Qty: 90 CAPSULE | Refills: 2 | Status: SHIPPED | OUTPATIENT
Start: 2023-03-08 | End: 2023-05-02 | Stop reason: DRUGHIGH

## 2023-03-13 ENCOUNTER — OFFICE VISIT (OUTPATIENT)
Dept: PAIN MEDICINE | Facility: CLINIC | Age: 48
End: 2023-03-13
Payer: COMMERCIAL

## 2023-03-13 VITALS
SYSTOLIC BLOOD PRESSURE: 126 MMHG | DIASTOLIC BLOOD PRESSURE: 80 MMHG | HEART RATE: 78 BPM | HEIGHT: 71 IN | WEIGHT: 252.19 LBS | BODY MASS INDEX: 35.31 KG/M2

## 2023-03-13 DIAGNOSIS — M54.16 LUMBAR RADICULOPATHY: Primary | ICD-10-CM

## 2023-03-13 DIAGNOSIS — M54.50 CHRONIC BILATERAL LOW BACK PAIN WITHOUT SCIATICA: ICD-10-CM

## 2023-03-13 DIAGNOSIS — M47.816 LUMBAR SPONDYLOSIS: ICD-10-CM

## 2023-03-13 DIAGNOSIS — G89.29 CHRONIC BILATERAL LOW BACK PAIN WITHOUT SCIATICA: ICD-10-CM

## 2023-03-13 PROCEDURE — 1159F MED LIST DOCD IN RCRD: CPT | Mod: CPTII,S$GLB,, | Performed by: PHYSICAL MEDICINE & REHABILITATION

## 2023-03-13 PROCEDURE — 3051F HG A1C>EQUAL 7.0%<8.0%: CPT | Mod: CPTII,S$GLB,, | Performed by: PHYSICAL MEDICINE & REHABILITATION

## 2023-03-13 PROCEDURE — 3074F SYST BP LT 130 MM HG: CPT | Mod: CPTII,S$GLB,, | Performed by: PHYSICAL MEDICINE & REHABILITATION

## 2023-03-13 PROCEDURE — 3008F BODY MASS INDEX DOCD: CPT | Mod: CPTII,S$GLB,, | Performed by: PHYSICAL MEDICINE & REHABILITATION

## 2023-03-13 PROCEDURE — 99214 OFFICE O/P EST MOD 30 MIN: CPT | Mod: S$GLB,,, | Performed by: PHYSICAL MEDICINE & REHABILITATION

## 2023-03-13 PROCEDURE — 3079F PR MOST RECENT DIASTOLIC BLOOD PRESSURE 80-89 MM HG: ICD-10-PCS | Mod: CPTII,S$GLB,, | Performed by: PHYSICAL MEDICINE & REHABILITATION

## 2023-03-13 PROCEDURE — 3008F PR BODY MASS INDEX (BMI) DOCUMENTED: ICD-10-PCS | Mod: CPTII,S$GLB,, | Performed by: PHYSICAL MEDICINE & REHABILITATION

## 2023-03-13 PROCEDURE — 99214 PR OFFICE/OUTPT VISIT, EST, LEVL IV, 30-39 MIN: ICD-10-PCS | Mod: S$GLB,,, | Performed by: PHYSICAL MEDICINE & REHABILITATION

## 2023-03-13 PROCEDURE — 1159F PR MEDICATION LIST DOCUMENTED IN MEDICAL RECORD: ICD-10-PCS | Mod: CPTII,S$GLB,, | Performed by: PHYSICAL MEDICINE & REHABILITATION

## 2023-03-13 PROCEDURE — 3051F PR MOST RECENT HEMOGLOBIN A1C LEVEL 7.0 - < 8.0%: ICD-10-PCS | Mod: CPTII,S$GLB,, | Performed by: PHYSICAL MEDICINE & REHABILITATION

## 2023-03-13 PROCEDURE — 3074F PR MOST RECENT SYSTOLIC BLOOD PRESSURE < 130 MM HG: ICD-10-PCS | Mod: CPTII,S$GLB,, | Performed by: PHYSICAL MEDICINE & REHABILITATION

## 2023-03-13 PROCEDURE — 99999 PR PBB SHADOW E&M-EST. PATIENT-LVL III: CPT | Mod: PBBFAC,,, | Performed by: PHYSICAL MEDICINE & REHABILITATION

## 2023-03-13 PROCEDURE — 99999 PR PBB SHADOW E&M-EST. PATIENT-LVL III: ICD-10-PCS | Mod: PBBFAC,,, | Performed by: PHYSICAL MEDICINE & REHABILITATION

## 2023-03-13 PROCEDURE — 3079F DIAST BP 80-89 MM HG: CPT | Mod: CPTII,S$GLB,, | Performed by: PHYSICAL MEDICINE & REHABILITATION

## 2023-03-13 RX ORDER — HYDROCODONE BITARTRATE AND ACETAMINOPHEN 5; 325 MG/1; MG/1
1 TABLET ORAL EVERY 6 HOURS PRN
Qty: 21 TABLET | Refills: 0 | Status: SHIPPED | OUTPATIENT
Start: 2023-03-13 | End: 2023-04-04 | Stop reason: SDUPTHER

## 2023-03-13 NOTE — PROGRESS NOTES
Est Patient Chronic Pain Note (Follow up Visit)      Chief Complaint:   Chief Complaint   Patient presents with    Low-back Pain        SUBJECTIVE:  Kendrick Nava is a 47 y.o. male presents today for injection follow-up.  He was last seen for L5-S1 IL JONATAN on 02/02/2023.  He reports that this resulted in 80+ % relief for the initial 2 weeks, but now has decreased to about 50-60% relief.  He feels that the pain is of the same type and quality and is in the same location..      Patient denies night fever/night sweats, urinary incontinence, bowel incontinence, significant weight loss, significant motor weakness, and loss of sensations.    Pain Disability Index Review:  Last 3 PDI Scores 1/13/2023 8/29/2022   Pain Disability Index (PDI) 50 48       Interval History (1/13/2023):  Kendrick Nava presents today for follow-up visit.  Patient was last seen on Visit date not found. Patient reports pain as 5/10 today.  He has been performing physician directed exercises targeting sciatic nerve pain for several weeks without improvement. He reports continued pain in his thoracolumbar region but his worst pain is persistently along his lumbosacral region in a band-like distribution with radiation into his right lower extremity along L5/S1 distribution. Reports occasional radiation into his left leg. He installs italo for a living and reports prolonged bending and stooping worsens his symptoms. He reports by the end of the day he walks with a limp.      Initial HPI 08/29/2022  Kendrick Nava is a 47 y.o. male who presents to the clinic for the evaluation of back pain.  He was referred by his primary care provider for further evaluation and management of this pain.  The pain started a few years ago following a fall from a ladder in 2018 and symptoms have been unchanged.The pain is located in the right thoracolumbar area and radiates to the lumbosacral region with occasional radiation into the left lower extremity  extending to the foot and ankle.  The pain is described as aching, numbness, tingling burning and is rated as 6/10. The pain is rated with a score of  4/10 on the BEST day and a score of 8/10 on the WORST day.  Symptoms interfere with daily activity. The pain is exacerbated by work activities, prolonged standing or sitting.  The pain is mitigated by activity modification.       Non-Pharmacologic Treatments:  Physical Therapy/Home Exercise: yes  Ice/Heat:yes  TENS: no  Acupuncture: no  Massage: yes  Chiropractic: yes    Other: no      Pain Medications:  - Opioids:  Tramadol  - Adjuvant Medications:  Mobic  - Anti-Coagulants:  None     report:  Reviewed and consistent with medication use as prescribed.      Pain Procedures:   -02/02/2023:  L5-S1 IL JONATAN, 80% relief for the initial 2 weeks with continued 50-60% relief      Imaging:   Outside CT and MRI of the thoracic lumbar spine reviewed 2018    CT lumbar spine 12/02/2022     FINDINGS:  No acute fracture or dislocation.  Moderate disc height loss T11-12 and T12-L1.  Mild disc height loss at L1-2 through L4-5.  Mild facet arthropathy.  No significant endplate sclerosis.  Mild Schmorl's node formation T12-L1 and L1-2.     SI joints unremarkable.  Mild aortoiliac atherosclerotic calcification.     Impression:     No acute abnormality identified.  Chronic mild discogenic degenerative change as described above    CT lumbar spine 07/22/2018  1.  Acute, traumatic fractures involving the left L2, left L3, and left L4 transverse processes.     2.  Multilevel lumbar degenerative disc disease, spondylosis, and stenoses, as discussed above. This is probably most prominent at L5-S1 on the right, with there is mass effect on and posterior displacement of the right S1 nerve root within the lateral recess, with right lateral recess stenosis.    X-ray lumbar spine 12/02/2021:  The vertebral bodies of the lumbar spine demonstrate a normal height and alignment.  There appears to be  mild chronic vertebral body height loss at the T11 and T12 levels with some spondylosis noted at these levels.  The disc space heights appear to be relatively well maintained.  Mild spondylosis noted anteriorly at the L3-4 and to a lesser degree the L4-5 levels.  No pars defects are noted.       Past Medical History:   Diagnosis Date    Diabetes mellitus, type 2     Hyperlipidemia     Testosterone deficiency     LOW Testosterone     Past Surgical History:   Procedure Laterality Date    EPIDURAL STEROID INJECTION N/A 2/2/2023    Procedure: Lumbar L5/S1 IL JONATAN with right paramedian approach RN IV Sedation;  Surgeon: Nico Angeles MD;  Location: [facility];  Service: Pain Management;  Laterality: N/A;     Social History     Socioeconomic History    Marital status:    Tobacco Use    Smoking status: Some Days     Types: Vaping with nicotine    Smokeless tobacco: Never   Substance and Sexual Activity    Alcohol use: Yes     Alcohol/week: 1.0 standard drink     Types: 1 Shots of liquor per week     Comment: once a month    Drug use: Never    Sexual activity: Yes     Partners: Male     Family History   Problem Relation Age of Onset    Diabetes type II Mother     Diabetes type II Father     Hypertension Father     Diabetes type II Brother     Cancer Maternal Grandmother     Diabetes Maternal Grandfather     No Known Problems Paternal Grandmother     Heart attack Paternal Grandfather        Review of patient's allergies indicates:   Allergen Reactions    Lipitor [atorvastatin]      depression       Current Outpatient Medications   Medication Sig    empagliflozin (JARDIANCE) 25 mg tablet Take 1 tablet (25 mg total) by mouth once daily.    gabapentin (NEURONTIN) 300 MG capsule Take 3 capsules (900 mg total) by mouth every evening.    metFORMIN (GLUCOPHAGE-XR) 750 MG ER 24hr tablet Take two tablets with breakfast and one in the evenings.    rosuvastatin (CRESTOR) 20 MG tablet Take 1 tablet (20 mg total) by mouth  "once daily.    HYDROcodone-acetaminophen (NORCO) 5-325 mg per tablet Take 1 tablet by mouth every 6 (six) hours as needed for Pain.     Current Facility-Administered Medications   Medication    testosterone cypionate injection 300 mg       Review of Systems     GENERAL:  No weight loss, malaise or fevers.  HEENT:   No recent changes in vision or hearing  NECK:  Negative for lumps, no difficulty with swallowing.  RESPIRATORY:  Negative for cough, wheezing or shortness of breath, patient denies any recent URI.  CARDIOVASCULAR:  Negative for chest pain, leg swelling or palpitations.  GI:  Negative for abdominal discomfort, blood in stools or black stools or change in bowel habits.  MUSCULOSKELETAL:  See HPI.  SKIN:  Negative for lesions, rash, and itching.  PSYCH:  No mood disorder or recent psychosocial stressors.  Patients sleep is not disturbed secondary to pain.  HEMATOLOGY/LYMPHOLOGY:  Negative for prolonged bleeding, bruising easily or swollen nodes.  Patient is not currently taking any anti-coagulants  NEURO:   No history of headaches, syncope, paralysis, seizures or tremors.  All other reviewed and negative other than HPI.    OBJECTIVE:    /80   Pulse 78   Ht 5' 11" (1.803 m)   Wt 114.4 kg (252 lb 3.3 oz)   BMI 35.18 kg/m²         Physical Exam    GENERAL: Well appearing, in no acute distress, alert and oriented x3.  PSYCH:  Mood and affect appropriate.  SKIN: Skin color, texture, turgor normal, no rashes or lesions.  HEAD/FACE:  Normocephalic, atraumatic. Cranial nerves grossly intact.  CV: RRR with palpation of the radial artery.  PULM: No evidence of respiratory difficulty, symmetric chest rise.  GI:  Soft and non-tender.  BACK: Straight leg raising in the sitting and supine positions is positive to radicular pain on the right, equivocal to radicular pain on the left.  Moderate pain to palpation over the facet joints of the lumbar spine and lumbar paraspinals, mostly on the left   Limited range of " motion secondary to pain reproduction.  EXTREMITIES: Peripheral joint ROM is full and pain free without obvious instability or laxity in all four extremities. No deformities, edema, or skin discoloration. Good capillary refill.  MUSCULOSKELETAL:  Hip and knee provocative maneuvers are negative.  There is no pain with palpation over the sacroiliac joints bilaterally.  FABERs test is negative.  FADIRs test is negative.   Bilateral upper and lower extremity strength is normal and symmetric.  No atrophy or tone abnormalities are noted.  NEURO: Bilateral upper and lower extremity coordination and muscle stretch reflexes are physiologic and symmetric.  Plantar response are downgoing. No clonus.  No loss of sensation is noted.  GAIT: normal.      LABS:  Lab Results   Component Value Date    WBC 7.86 02/16/2023    HGB 14.9 02/16/2023    HCT 45.5 02/16/2023    MCV 86 02/16/2023     02/16/2023       CMP  Sodium   Date Value Ref Range Status   02/16/2023 138 136 - 145 mmol/L Final     Potassium   Date Value Ref Range Status   02/16/2023 4.3 3.5 - 5.1 mmol/L Final     Chloride   Date Value Ref Range Status   02/16/2023 103 95 - 110 mmol/L Final     CO2   Date Value Ref Range Status   02/16/2023 23 23 - 29 mmol/L Final     Glucose   Date Value Ref Range Status   02/16/2023 166 (H) 70 - 110 mg/dL Final     BUN   Date Value Ref Range Status   02/16/2023 10 6 - 20 mg/dL Final     Creatinine   Date Value Ref Range Status   02/16/2023 0.9 0.5 - 1.4 mg/dL Final     Calcium   Date Value Ref Range Status   02/16/2023 9.4 8.7 - 10.5 mg/dL Final     Total Protein   Date Value Ref Range Status   02/16/2023 7.2 6.0 - 8.4 g/dL Final     Albumin   Date Value Ref Range Status   02/16/2023 4.3 3.5 - 5.2 g/dL Final   11/04/2022 4.8 3.6 - 5.1 g/dL Final     Total Bilirubin   Date Value Ref Range Status   02/16/2023 0.5 0.1 - 1.0 mg/dL Final     Comment:     For infants and newborns, interpretation of results should be based  on gestational  age, weight and in agreement with clinical  observations.    Premature Infant recommended reference ranges:  Up to 24 hours.............<8.0 mg/dL  Up to 48 hours............<12.0 mg/dL  3-5 days..................<15.0 mg/dL  6-29 days.................<15.0 mg/dL       Alkaline Phosphatase   Date Value Ref Range Status   02/16/2023 61 55 - 135 U/L Final     AST   Date Value Ref Range Status   02/16/2023 26 10 - 40 U/L Final     ALT   Date Value Ref Range Status   02/16/2023 27 10 - 44 U/L Final     Anion Gap   Date Value Ref Range Status   02/16/2023 12 8 - 16 mmol/L Final     eGFR if    Date Value Ref Range Status   06/06/2022 >60.0 >60 mL/min/1.73 m^2 Final     eGFR if non    Date Value Ref Range Status   06/06/2022 >60.0 >60 mL/min/1.73 m^2 Final     Comment:     Calculation used to obtain the estimated glomerular filtration  rate (eGFR) is the CKD-EPI equation.          Lab Results   Component Value Date    HGBA1C 7.7 (H) 02/16/2023             ASSESSMENT: 47 y.o. year old male with chronic lower back pain, consistent with     1. Lumbar radiculopathy  IR Epidural Transfor 1st Vert Lumbar Favian    Case Request-RAD/Other Procedure Area: Bilateral L5/S1 TF JONATAN      2. Chronic bilateral low back pain without sciatica        3. Lumbar spondylosis                  PLAN:   - Interventions: s/p L5/S1 IL JONATAN with 80% relief with the initial 2 weeks.  Will schedule for bilateral L5-S1 TFESI more targeted approach at this level.  Explained procedure in detail, risks, benefits, and alternatives the patient today and he elected to pursue this intervention at this time.    - Anticoagulation use: no       - Medications: I have stressed the importance of physical activity and a home exercise plan to help with pain and improve health. and Patient can continue with medications for now since they are providing benefits, using them appropriately, and without side effects.     Provide short course of  Norco 5/325 mg Q 8 p.r.n., 21 tablets, 0 refills.  I reviewed the  and is consistent patient's history.  - Will continue gabapentin at current dosing          - Therapy:  Advised patient continue with activities as tolerated    - Psychological:  Discussed coping mechanisms to help address chronic pain issues    - Labs:  Reviewed    - Imaging: Reviewed available imaging with patient and answered any questions they had regarding study.      - Consults/Referrals:  None at this time    - Records:  Reviewed/Obtain old records from outside physicians and imaging    - Follow up visit: 4 weeks after injection    - Counseled patient regarding the importance of activity modification and physical therapy    - This condition does not require this patient to take time off of work, and the primary goal of our Pain Management services is to improve the patient's functional capacity.    - Patient Questions: Answered all of the patient's questions regarding diagnosis, therapy, and treatment        The above plan and management options were discussed at length with patient. Patient is in agreement with the above and verbalized understanding.    I discussed the goals of interventional chronic pain management with the patient on today's visit.  I explained the utility of injections for diagnostic and therapeutic purposes.  We discussed a multimodal approach to pain including treating the patient's given worst pain at any given time.  We will use a systematic approach to addressing pain.  We will also adopt a multimodal approach that includes injections, adjuvant medications, physical therapy, at times psychiatry.  There may be a limited role for opioid use intermittently in the treatment of pain, more particularly for acute pain although no one approach can be used as a sole treatment modality.    I emphasized the importance of regular exercise, core strengthening and stretching, diet and weight loss as a cornerstone of long-term  pain management.      Nico Angeles MD  Interventional Pain Management  [facility]    Disclaimer:  This note was prepared using voice recognition system and is likely to have sound alike errors that may have been overlooked even after proof reading.  Please call me with any questions

## 2023-03-17 ENCOUNTER — CLINICAL SUPPORT (OUTPATIENT)
Dept: INTERNAL MEDICINE | Facility: CLINIC | Age: 48
End: 2023-03-17
Payer: COMMERCIAL

## 2023-03-17 PROCEDURE — 99999 PR PBB SHADOW E&M-EST. PATIENT-LVL I: CPT | Mod: PBBFAC,,,

## 2023-03-17 PROCEDURE — 96372 THER/PROPH/DIAG INJ SC/IM: CPT | Mod: S$GLB,,, | Performed by: FAMILY MEDICINE

## 2023-03-17 PROCEDURE — 99999 PR PBB SHADOW E&M-EST. PATIENT-LVL I: ICD-10-PCS | Mod: PBBFAC,,,

## 2023-03-17 PROCEDURE — 96372 PR INJECTION,THERAP/PROPH/DIAG2ST, IM OR SUBCUT: ICD-10-PCS | Mod: S$GLB,,, | Performed by: FAMILY MEDICINE

## 2023-03-17 RX ADMIN — TESTOSTERONE CYPIONATE 300 MG: 200 INJECTION, SOLUTION INTRAMUSCULAR at 02:03

## 2023-03-27 NOTE — PRE-PROCEDURE INSTRUCTIONS
Spoke with patient regarding procedure scheduled on 4.4     Arrival time 0730     Has patient been sick with fever or on antibiotics within the last 7 days? No     Does the patient have any open wounds, sores or rashes? No     Does the patient have any recent fractures? no     Has patient received a vaccination within the last 7 days? No     Received the COVID vaccination? yes     Has the patient stopped all medications as directed? Hold dm meds am of procedure      Does patient have a pacemaker and or defibrillator? no     Does the patient have a ride to and from procedure and someone reliable to remain with patient? wife      Is the patient diabetic? yes      Does the patient have sleep apnea? Or use O2 at home? no     Is the patient receiving sedation? yes     Is the patient instructed to remain NPO beginning at midnight the night before their procedure? yes     Procedure location confirmed with patient? Yes     Covid- Denies signs/symptoms. Instructed to notify PAT/MD if any changes.

## 2023-03-30 ENCOUNTER — PATIENT MESSAGE (OUTPATIENT)
Dept: INTERNAL MEDICINE | Facility: CLINIC | Age: 48
End: 2023-03-30
Payer: COMMERCIAL

## 2023-04-03 ENCOUNTER — CLINICAL SUPPORT (OUTPATIENT)
Dept: INTERNAL MEDICINE | Facility: CLINIC | Age: 48
End: 2023-04-03
Payer: COMMERCIAL

## 2023-04-03 DIAGNOSIS — E34.9 TESTOSTERONE DEFICIENCY: Primary | ICD-10-CM

## 2023-04-03 PROCEDURE — 99999 PR PBB SHADOW E&M-EST. PATIENT-LVL II: ICD-10-PCS | Mod: PBBFAC,,,

## 2023-04-03 PROCEDURE — 99999 PR PBB SHADOW E&M-EST. PATIENT-LVL II: CPT | Mod: PBBFAC,,,

## 2023-04-03 PROCEDURE — 96372 THER/PROPH/DIAG INJ SC/IM: CPT | Mod: S$GLB,,, | Performed by: INTERNAL MEDICINE

## 2023-04-03 PROCEDURE — 96372 PR INJECTION,THERAP/PROPH/DIAG2ST, IM OR SUBCUT: ICD-10-PCS | Mod: S$GLB,,, | Performed by: INTERNAL MEDICINE

## 2023-04-03 RX ORDER — TESTOSTERONE CYPIONATE 200 MG/ML
300 INJECTION, SOLUTION INTRAMUSCULAR
Status: DISCONTINUED | OUTPATIENT
Start: 2023-04-03 | End: 2023-04-03 | Stop reason: HOSPADM

## 2023-04-03 RX ORDER — TESTOSTERONE CYPIONATE 200 MG/ML
300 INJECTION, SOLUTION INTRAMUSCULAR
Status: CANCELLED | OUTPATIENT
Start: 2023-04-03 | End: 2023-06-26

## 2023-04-03 RX ADMIN — TESTOSTERONE CYPIONATE 300 MG: 200 INJECTION, SOLUTION INTRAMUSCULAR at 09:04

## 2023-04-03 NOTE — PROGRESS NOTES
Patient came into clinic today for 300mg of Depo Testosterone as ordered by . Pt instructed to wait 15 minutes following injection for possible reaction. pt verbalized understanding and tolerated well. scheduled next injection with pt.

## 2023-04-04 ENCOUNTER — HOSPITAL ENCOUNTER (OUTPATIENT)
Facility: HOSPITAL | Age: 48
Discharge: HOME | End: 2023-04-04
Attending: PHYSICAL MEDICINE & REHABILITATION | Admitting: PHYSICAL MEDICINE & REHABILITATION
Payer: COMMERCIAL

## 2023-04-04 VITALS
TEMPERATURE: 97 F | RESPIRATION RATE: 16 BRPM | BODY MASS INDEX: 35.18 KG/M2 | WEIGHT: 251.31 LBS | HEIGHT: 71 IN | SYSTOLIC BLOOD PRESSURE: 121 MMHG | DIASTOLIC BLOOD PRESSURE: 79 MMHG | HEART RATE: 63 BPM | OXYGEN SATURATION: 97 %

## 2023-04-04 DIAGNOSIS — M54.16 LUMBAR RADICULOPATHY: Primary | ICD-10-CM

## 2023-04-04 LAB — POCT GLUCOSE: 190 MG/DL (ref 70–110)

## 2023-04-04 PROCEDURE — 25000003 PHARM REV CODE 250: Performed by: PHYSICAL MEDICINE & REHABILITATION

## 2023-04-04 PROCEDURE — 63600175 PHARM REV CODE 636 W HCPCS: Performed by: PHYSICAL MEDICINE & REHABILITATION

## 2023-04-04 PROCEDURE — 82962 GLUCOSE BLOOD TEST: CPT | Performed by: PHYSICAL MEDICINE & REHABILITATION

## 2023-04-04 PROCEDURE — 64483 NJX AA&/STRD TFRM EPI L/S 1: CPT | Mod: LT | Performed by: PHYSICAL MEDICINE & REHABILITATION

## 2023-04-04 PROCEDURE — 64483 NJX AA&/STRD TFRM EPI L/S 1: CPT | Mod: 50,,, | Performed by: PHYSICAL MEDICINE & REHABILITATION

## 2023-04-04 PROCEDURE — 25500020 PHARM REV CODE 255: Performed by: PHYSICAL MEDICINE & REHABILITATION

## 2023-04-04 PROCEDURE — 64483 PR EPIDURAL INJ, ANES/STEROID, TRANSFORAMINAL, LUMB/SACR, SNGL LEVL: ICD-10-PCS | Mod: 50,,, | Performed by: PHYSICAL MEDICINE & REHABILITATION

## 2023-04-04 RX ORDER — METHYLPREDNISOLONE ACETATE 40 MG/ML
INJECTION, SUSPENSION INTRA-ARTICULAR; INTRALESIONAL; INTRAMUSCULAR; SOFT TISSUE
Status: DISCONTINUED | OUTPATIENT
Start: 2023-04-04 | End: 2023-04-04 | Stop reason: HOSPADM

## 2023-04-04 RX ORDER — ONDANSETRON 2 MG/ML
4 INJECTION INTRAMUSCULAR; INTRAVENOUS ONCE AS NEEDED
Status: DISCONTINUED | OUTPATIENT
Start: 2023-04-04 | End: 2023-04-04 | Stop reason: HOSPADM

## 2023-04-04 RX ORDER — MIDAZOLAM HYDROCHLORIDE 1 MG/ML
INJECTION, SOLUTION INTRAMUSCULAR; INTRAVENOUS
Status: DISCONTINUED | OUTPATIENT
Start: 2023-04-04 | End: 2023-04-04 | Stop reason: HOSPADM

## 2023-04-04 RX ORDER — FENTANYL CITRATE 50 UG/ML
INJECTION, SOLUTION INTRAMUSCULAR; INTRAVENOUS
Status: DISCONTINUED | OUTPATIENT
Start: 2023-04-04 | End: 2023-04-04 | Stop reason: HOSPADM

## 2023-04-04 RX ORDER — BUPIVACAINE HYDROCHLORIDE 2.5 MG/ML
INJECTION, SOLUTION EPIDURAL; INFILTRATION; INTRACAUDAL
Status: DISCONTINUED | OUTPATIENT
Start: 2023-04-04 | End: 2023-04-04 | Stop reason: HOSPADM

## 2023-04-04 NOTE — DISCHARGE SUMMARY
Discharge Note  Short Stay      SUMMARY     Admit Date: 4/4/2023    Attending Physician: Nico Angeles MD        Discharge Physician: Nico Angeles MD        Discharge Date: 4/4/2023 8:25 AM    Procedure(s) (LRB):  Bilateral L5/S1 TF JONATAN (Bilateral)    Final Diagnosis: Lumbar radiculopathy [M54.16]    Disposition: Home or self care    Patient Instructions:   Current Discharge Medication List        CONTINUE these medications which have NOT CHANGED    Details   empagliflozin (JARDIANCE) 25 mg tablet Take 1 tablet (25 mg total) by mouth once daily.  Qty: 30 tablet, Refills: 11      gabapentin (NEURONTIN) 300 MG capsule Take 3 capsules (900 mg total) by mouth every evening.  Qty: 90 capsule, Refills: 2      HYDROcodone-acetaminophen (NORCO) 5-325 mg per tablet Take 1 tablet by mouth every 6 (six) hours as needed for Pain.  Qty: 21 tablet, Refills: 0    Comments: Quantity prescribed more than 7 day supply? No      metFORMIN (GLUCOPHAGE-XR) 750 MG ER 24hr tablet Take two tablets with breakfast and one in the evenings.  Qty: 90 tablet, Refills: 11      rosuvastatin (CRESTOR) 20 MG tablet Take 1 tablet (20 mg total) by mouth once daily.  Qty: 90 tablet, Refills: 3                 Discharge Diagnosis: Lumbar radiculopathy [M54.16]  Condition on Discharge: Stable with no complications to procedure   Diet on Discharge: Same as before.  Activity: as per instruction sheet.  Discharge to: Home with a responsible adult.  Follow up: 2-4 weeks       Please call the office at [phone] if you experience any weakness or loss of sensation, fever > 101.5, pain uncontrolled with oral medications, persistent nausea/vomiting/or diarrhea, redness or drainage from the incisions, or any other worrisome concerns. If physician on call was not reached or could not communicate with our office for any reason please go to the nearest emergency department

## 2023-04-04 NOTE — DISCHARGE INSTRUCTIONS

## 2023-04-04 NOTE — OP NOTE
INFORMED CONSENT: The procedure, risks, benefits and options were discussed with patient. There are no contraindications to the procedure. The patient expressed understanding and agreed to proceed. The personnel performing the procedure was discussed.    04/04/2023    Surgeon: Nico Angeles MD    Assistants: None    Sedation: Conscious sedation provided by M.D    The patient was monitored with continuous pulse oximetry, EKG, and intermittent blood pressure monitors, immediately prior to administration of sedation.  The patient was hemodynamically stable throughout the entire process was responsive to voice, and breathing spontaneously.  Supplemental O2 was provided at 2L/min via nasal cannula.  Patient was comfortable for the duration of the procedure.     There was a total of 2mg IV Midazolam and 75mcg Fentanyl titrated for the procedure    Total sedation time was >10minutes and <20minutes      PROCEDURE:  Bilateral  L5/S1  1) Left  L5/S1 TRANSFORAMINAL EPIDURAL STEROID INJECTION  2) Right  L5/S1 TRANSFORAMINAL EPIDURAL STEROID INJECTION      Pre Procedure diagnosis:  Bilateral L5/S1 Lumbar radiculopathy [M54.16]    Post-Procedure diagnosis:   same    Complications: None    Specimens: None      DESCRIPTION OF PROCEDURE: The patient was brought to the procedure room. IV access was obtained prior to the procedure. The patient was positioned prone on the fluoroscopy table. Continuous hemodynamic monitoring was initiated including blood pressure, EKG, and pulse oximetry. . The skin was prepped with chlorhexidine and draped in a sterile fashion. Skin anesthesia was achieved using a total of 10mL of lidocaine, 5mL over each respective injection site.     The  L5/S1 transforaminal spaces were identified with fluoroscopy in the  AP, oblique, and lateral views.  A 22 gauge spinal quinke needle was then advanced into the area of the trans foraminal spaces bilaterally with confirmation of proper needle position using AP,  oblique, and lateral fluoroscopic views. Once the needle tip was in the area of the transforaminal space, and there was no blood, CSF or paraesthesias,  1.5 mL of Omnipaque 300mg/ml was injected on each side for a total of 3mL.  Fluoroscopic imaging in the AP and lateral views revealed a clear outline of the spinal nerve with proximal spread of agent through the neural foramen into the epidural space. A total combination of 1 mL of Bupivicaine 0.25% and 40 mg depo medrol was injected on each side for a total of 4mL of injected medications with displacement of the contrast dye confirming that the medication went into the area of the transforaminal spaces bilaterally. A sterile dressing was applied.   Patient tolerated the procedure well.    Patient was taken back to the recovery room for further observation.     The patient was discharged to home in stable condition

## 2023-04-04 NOTE — H&P
"HPI  Patient presenting for Procedure(s) (LRB):  Bilateral L5/S1 TF JONATAN (Bilateral)     Patient on Anti-coagulation No    No health changes since previous encounter    Past Medical History:   Diagnosis Date    Diabetes mellitus, type 2     Hyperlipidemia     Testosterone deficiency     LOW Testosterone     Past Surgical History:   Procedure Laterality Date    EPIDURAL STEROID INJECTION N/A 2/2/2023    Procedure: Lumbar L5/S1 IL JONATAN with right paramedian approach RN IV Sedation;  Surgeon: Nico Angeles MD;  Location: [facility];  Service: Pain Management;  Laterality: N/A;     Review of patient's allergies indicates:   Allergen Reactions    Lipitor [atorvastatin]      depression        No current facility-administered medications on file prior to encounter.     Current Outpatient Medications on File Prior to Encounter   Medication Sig Dispense Refill    empagliflozin (JARDIANCE) 25 mg tablet Take 1 tablet (25 mg total) by mouth once daily. 30 tablet 11    gabapentin (NEURONTIN) 300 MG capsule Take 3 capsules (900 mg total) by mouth every evening. 90 capsule 2    HYDROcodone-acetaminophen (NORCO) 5-325 mg per tablet Take 1 tablet by mouth every 6 (six) hours as needed for Pain. 21 tablet 0    metFORMIN (GLUCOPHAGE-XR) 750 MG ER 24hr tablet Take two tablets with breakfast and one in the evenings. 90 tablet 11    rosuvastatin (CRESTOR) 20 MG tablet Take 1 tablet (20 mg total) by mouth once daily. 90 tablet 3        PMHx, PSHx, Allergies, Medications reviewed in epic    ROS negative except pain complaints in HPI    OBJECTIVE:    /74 (BP Location: Right arm, Patient Position: Sitting)   Pulse 61   Temp 97.3 °F (36.3 °C) (Temporal)   Resp 16   Ht 5' 11" (1.803 m)   Wt 114 kg (251 lb 5.2 oz)   SpO2 100%   BMI 35.05 kg/m²     PHYSICAL EXAMINATION:    GENERAL: Well appearing, in no acute distress, alert and oriented x3.  PSYCH:  Mood and affect appropriate.  SKIN: Skin color, texture, turgor normal, no " rashes or lesions which will impact the procedure.  CV: RRR with palpation of the radial artery.  PULM: No evidence of respiratory difficulty, symmetric chest rise. Clear to auscultation.  NEURO: Cranial nerves grossly intact.    Plan:    Proceed with procedure as planned Procedure(s) (LRB):  Bilateral L5/S1 TF JONATAN (Bilateral)    Nico Angeles MD  04/04/2023

## 2023-04-16 ENCOUNTER — PATIENT MESSAGE (OUTPATIENT)
Dept: INTERNAL MEDICINE | Facility: CLINIC | Age: 48
End: 2023-04-16
Payer: COMMERCIAL

## 2023-04-18 ENCOUNTER — TELEPHONE (OUTPATIENT)
Dept: INTERNAL MEDICINE | Facility: CLINIC | Age: 48
End: 2023-04-18
Payer: COMMERCIAL

## 2023-04-18 ENCOUNTER — CLINICAL SUPPORT (OUTPATIENT)
Dept: INTERNAL MEDICINE | Facility: CLINIC | Age: 48
End: 2023-04-18
Payer: COMMERCIAL

## 2023-04-18 PROCEDURE — 99999 PR PBB SHADOW E&M-EST. PATIENT-LVL II: CPT | Mod: PBBFAC,,,

## 2023-04-18 PROCEDURE — 96372 PR INJECTION,THERAP/PROPH/DIAG2ST, IM OR SUBCUT: ICD-10-PCS | Mod: S$GLB,,, | Performed by: FAMILY MEDICINE

## 2023-04-18 PROCEDURE — 96372 THER/PROPH/DIAG INJ SC/IM: CPT | Mod: S$GLB,,, | Performed by: FAMILY MEDICINE

## 2023-04-18 PROCEDURE — 99999 PR PBB SHADOW E&M-EST. PATIENT-LVL II: ICD-10-PCS | Mod: PBBFAC,,,

## 2023-04-18 RX ORDER — TESTOSTERONE CYPIONATE 200 MG/ML
300 INJECTION, SOLUTION INTRAMUSCULAR
Status: COMPLETED | OUTPATIENT
Start: 2023-04-18 | End: 2023-04-18

## 2023-04-18 RX ADMIN — TESTOSTERONE CYPIONATE 300 MG: 200 INJECTION, SOLUTION INTRAMUSCULAR at 01:04

## 2023-04-18 NOTE — PROGRESS NOTES
Pt came into clinic today for DepoTestosterone 300mg IM as ordered by . Wasted 0.5mL (100mg) with  witness. Pt instructed to wait 15 minutes following injection for possible reaction. Pt verbalized understanding and tolerated injection well.

## 2023-04-19 ENCOUNTER — PATIENT MESSAGE (OUTPATIENT)
Dept: ADMINISTRATIVE | Facility: HOSPITAL | Age: 48
End: 2023-04-19
Payer: COMMERCIAL

## 2023-05-01 NOTE — PROGRESS NOTES
Est Patient Chronic Pain Note (Follow up Visit)      Chief Complaint:   Chief Complaint   Patient presents with    Low-back Pain        SUBJECTIVE:  Interval History (5/1/2023): Kendrick Nava presents today for follow-up visit.  he underwent bilateral L5/S1 TF JONATAN on 4/4/23.  The patient reports that he is/was better following the procedure.  he reports initially experienced worsening pain for about a week after the procedure, followed by  95% pain relief x 1 week before relief dwindled to 20%. Reports continued pressure in his lower back and pain across the lumbosacral region in a band like distribution with radiation into his right lower extremity along L5/S1 distribution. Takes Gabapentin and Flexeril nightly with good relief, no side effects. Norco for severe pain.  Patient reports pain as 6/10 today.    Interval HPI 03/13/2023  Kendrick Nava is a 47 y.o. male presents today for injection follow-up.  He was last seen for L5-S1 IL JONATAN on 02/02/2023.  He reports that this resulted in 80+ % relief for the initial 2 weeks, but now has decreased to about 50-60% relief.  He feels that the pain is of the same type and quality and is in the same location..      Patient denies night fever/night sweats, urinary incontinence, bowel incontinence, significant weight loss, significant motor weakness, and loss of sensations.    Pain Disability Index Review:  Last 3 PDI Scores 1/13/2023 8/29/2022   Pain Disability Index (PDI) 50 48       Interval History (1/13/2023):  Kendrick Nava presents today for follow-up visit.  Patient was last seen on Visit date not found. Patient reports pain as 5/10 today.  He has been performing physician directed exercises targeting sciatic nerve pain for several weeks without improvement. He reports continued pain in his thoracolumbar region but his worst pain is persistently along his lumbosacral region in a band-like distribution with radiation into his right lower extremity along L5/S1  distribution. Reports occasional radiation into his left leg. He installs italo for a living and reports prolonged bending and stooping worsens his symptoms. He reports by the end of the day he walks with a limp.      Initial HPI 08/29/2022  Kendrick Nava is a 47 y.o. male who presents to the clinic for the evaluation of back pain.  He was referred by his primary care provider for further evaluation and management of this pain.  The pain started a few years ago following a fall from a ladder in 2018 and symptoms have been unchanged.The pain is located in the right thoracolumbar area and radiates to the lumbosacral region with occasional radiation into the left lower extremity extending to the foot and ankle.  The pain is described as aching, numbness, tingling burning and is rated as 6/10. The pain is rated with a score of  4/10 on the BEST day and a score of 8/10 on the WORST day.  Symptoms interfere with daily activity. The pain is exacerbated by work activities, prolonged standing or sitting.  The pain is mitigated by activity modification.       Non-Pharmacologic Treatments:  Physical Therapy/Home Exercise: yes  Ice/Heat:yes  TENS: no  Acupuncture: no  Massage: yes  Chiropractic: yes    Other: no      Pain Medications:  - Opioids:  Tramadol  - Adjuvant Medications:  Mobic  - Anti-Coagulants:  None     report:  Reviewed and consistent with medication use as prescribed.      Pain Procedures:   -02/02/2023:  L5-S1 IL JONATAN, 80% relief for the initial 2 weeks with continued 50-60% relief  - bilateral L5/S1 TF JONATAN on 4/4/23 with 95% relief x 1 week, then 20% relief    Imaging:   Outside CT and MRI of the thoracic lumbar spine reviewed 2018    CT lumbar spine 12/02/2022     FINDINGS:  No acute fracture or dislocation.  Moderate disc height loss T11-12 and T12-L1.  Mild disc height loss at L1-2 through L4-5.  Mild facet arthropathy.  No significant endplate sclerosis.  Mild Schmorl's node formation T12-L1 and  L1-2.     SI joints unremarkable.  Mild aortoiliac atherosclerotic calcification.     Impression:     No acute abnormality identified.  Chronic mild discogenic degenerative change as described above    CT lumbar spine 07/22/2018  1.  Acute, traumatic fractures involving the left L2, left L3, and left L4 transverse processes.     2.  Multilevel lumbar degenerative disc disease, spondylosis, and stenoses, as discussed above. This is probably most prominent at L5-S1 on the right, with there is mass effect on and posterior displacement of the right S1 nerve root within the lateral recess, with right lateral recess stenosis.    X-ray lumbar spine 12/02/2021:  The vertebral bodies of the lumbar spine demonstrate a normal height and alignment.  There appears to be mild chronic vertebral body height loss at the T11 and T12 levels with some spondylosis noted at these levels.  The disc space heights appear to be relatively well maintained.  Mild spondylosis noted anteriorly at the L3-4 and to a lesser degree the L4-5 levels.  No pars defects are noted.       Past Medical History:   Diagnosis Date    Diabetes mellitus, type 2     Hyperlipidemia     Testosterone deficiency     LOW Testosterone     Past Surgical History:   Procedure Laterality Date    EPIDURAL STEROID INJECTION N/A 2/2/2023    Procedure: Lumbar L5/S1 IL JONATAN with right paramedian approach RN IV Sedation;  Surgeon: Nico Angeles MD;  Location: [facility];  Service: Pain Management;  Laterality: N/A;    SELECTIVE INJECTION OF ANESTHETIC AGENT AROUND LUMBAR SPINAL NERVE ROOT BY TRANSFORAMINAL APPROACH Bilateral 4/4/2023    Procedure: Bilateral L5/S1 TF JONATAN;  Surgeon: Nico Angeels MD;  Location: [facility];  Service: Pain Management;  Laterality: Bilateral;     Social History     Socioeconomic History    Marital status:    Tobacco Use    Smoking status: Some Days     Types: Vaping with nicotine    Smokeless tobacco: Never   Substance and Sexual  Activity    Alcohol use: Yes     Alcohol/week: 1.0 standard drink     Types: 1 Shots of liquor per week     Comment: once a month    Drug use: Never    Sexual activity: Yes     Partners: Male     Family History   Problem Relation Age of Onset    Diabetes type II Mother     Diabetes type II Father     Hypertension Father     Diabetes type II Brother     Cancer Maternal Grandmother     Diabetes Maternal Grandfather     No Known Problems Paternal Grandmother     Heart attack Paternal Grandfather        Review of patient's allergies indicates:   Allergen Reactions    Lipitor [atorvastatin]      depression       Current Outpatient Medications   Medication Sig    empagliflozin (JARDIANCE) 25 mg tablet Take 1 tablet (25 mg total) by mouth once daily.    HYDROcodone-acetaminophen (NORCO) 5-325 mg per tablet Take 1 tablet by mouth every 6 (six) hours as needed for Pain.    metFORMIN (GLUCOPHAGE-XR) 750 MG ER 24hr tablet Take two tablets with breakfast and one in the evenings.    rosuvastatin (CRESTOR) 20 MG tablet Take 1 tablet (20 mg total) by mouth once daily.    celecoxib (CELEBREX) 100 MG capsule Take 1 capsule (100 mg total) by mouth 2 (two) times daily as needed for Pain.    cyclobenzaprine (FLEXERIL) 10 MG tablet Take 1 tablet (10 mg total) by mouth every evening.    gabapentin (NEURONTIN) 300 MG capsule Take 2 capsules (600 mg total) by mouth 3 (three) times daily. Increase from 600 mg nightly to 600 mg three times daily by adding 1 additional tablet every 3 days as tolerated     No current facility-administered medications for this visit.       Review of Systems     GENERAL:  No weight loss, malaise or fevers.  HEENT:   No recent changes in vision or hearing  NECK:  Negative for lumps, no difficulty with swallowing.  RESPIRATORY:  Negative for cough, wheezing or shortness of breath, patient denies any recent URI.  CARDIOVASCULAR:  Negative for chest pain, leg swelling or palpitations.  GI:  Negative for abdominal  "discomfort, blood in stools or black stools or change in bowel habits.  MUSCULOSKELETAL:  See HPI.  SKIN:  Negative for lesions, rash, and itching.  PSYCH:  No mood disorder or recent psychosocial stressors.  Patients sleep is not disturbed secondary to pain.  HEMATOLOGY/LYMPHOLOGY:  Negative for prolonged bleeding, bruising easily or swollen nodes.  Patient is not currently taking any anti-coagulants  NEURO:   No history of headaches, syncope, paralysis, seizures or tremors.  All other reviewed and negative other than HPI.    OBJECTIVE:    /79   Pulse 76   Ht 5' 11" (1.803 m)   Wt 117.3 kg (258 lb 7.8 oz)   BMI 36.05 kg/m²         Physical Exam    GENERAL: Well appearing, in no acute distress, alert and oriented x3.  PSYCH:  Mood and affect appropriate.  SKIN: Skin color, texture, turgor normal, no rashes or lesions.  HEAD/FACE:  Normocephalic, atraumatic. Cranial nerves grossly intact.  CV: RRR with palpation of the radial artery.  PULM: No evidence of respiratory difficulty, symmetric chest rise.  GI:  Soft and non-tender.  BACK: Straight leg raising in the sitting and supine positions is positive to radicular pain on the right, equivocal to radicular pain on the left.  Moderate pain to palpation over the facet joints of the lumbar spine and lumbar paraspinals, mostly on the left   Limited range of motion secondary to pain reproduction.  EXTREMITIES: Peripheral joint ROM is full and pain free without obvious instability or laxity in all four extremities. No deformities, edema, or skin discoloration. Good capillary refill.  MUSCULOSKELETAL:  Hip and knee provocative maneuvers are negative.  There is no pain with palpation over the sacroiliac joints bilaterally.  FABERs test is negative.  FADIRs test is negative.   Bilateral upper and lower extremity strength is normal and symmetric.  No atrophy or tone abnormalities are noted.  NEURO: Bilateral upper and lower extremity coordination and muscle stretch " reflexes are physiologic and symmetric.  Plantar response are downgoing. No clonus.  No loss of sensation is noted.  GAIT: normal.      LABS:  Lab Results   Component Value Date    WBC 7.86 02/16/2023    HGB 14.9 02/16/2023    HCT 45.5 02/16/2023    MCV 86 02/16/2023     02/16/2023       CMP  Sodium   Date Value Ref Range Status   02/16/2023 138 136 - 145 mmol/L Final     Potassium   Date Value Ref Range Status   02/16/2023 4.3 3.5 - 5.1 mmol/L Final     Chloride   Date Value Ref Range Status   02/16/2023 103 95 - 110 mmol/L Final     CO2   Date Value Ref Range Status   02/16/2023 23 23 - 29 mmol/L Final     Glucose   Date Value Ref Range Status   02/16/2023 166 (H) 70 - 110 mg/dL Final     BUN   Date Value Ref Range Status   02/16/2023 10 6 - 20 mg/dL Final     Creatinine   Date Value Ref Range Status   02/16/2023 0.9 0.5 - 1.4 mg/dL Final     Calcium   Date Value Ref Range Status   02/16/2023 9.4 8.7 - 10.5 mg/dL Final     Total Protein   Date Value Ref Range Status   02/16/2023 7.2 6.0 - 8.4 g/dL Final     Albumin   Date Value Ref Range Status   02/16/2023 4.3 3.5 - 5.2 g/dL Final   11/04/2022 4.8 3.6 - 5.1 g/dL Final     Total Bilirubin   Date Value Ref Range Status   02/16/2023 0.5 0.1 - 1.0 mg/dL Final     Comment:     For infants and newborns, interpretation of results should be based  on gestational age, weight and in agreement with clinical  observations.    Premature Infant recommended reference ranges:  Up to 24 hours.............<8.0 mg/dL  Up to 48 hours............<12.0 mg/dL  3-5 days..................<15.0 mg/dL  6-29 days.................<15.0 mg/dL       Alkaline Phosphatase   Date Value Ref Range Status   02/16/2023 61 55 - 135 U/L Final     AST   Date Value Ref Range Status   02/16/2023 26 10 - 40 U/L Final     ALT   Date Value Ref Range Status   02/16/2023 27 10 - 44 U/L Final     Anion Gap   Date Value Ref Range Status   02/16/2023 12 8 - 16 mmol/L Final     eGFR if     Date Value Ref Range Status   06/06/2022 >60.0 >60 mL/min/1.73 m^2 Final     eGFR if non    Date Value Ref Range Status   06/06/2022 >60.0 >60 mL/min/1.73 m^2 Final     Comment:     Calculation used to obtain the estimated glomerular filtration  rate (eGFR) is the CKD-EPI equation.          Lab Results   Component Value Date    HGBA1C 7.7 (H) 02/16/2023             ASSESSMENT: 47 y.o. year old male with chronic lower back pain, consistent with     1. Lumbar radiculopathy                    PLAN:   - Interventions: None at this time, hold off on injection for now, NSG as last resort as patient would like to avoid surgery  -s/p  - bilateral L5/S1 TF JONATAN on 4/4/23 with 95% relief x 1 week, then 20% relief  s/p L5/S1 IL JONATAN with 80% relief with the initial 2 weeks.      - Anticoagulation use: no       - Medications: I have stressed the importance of physical activity and a home exercise plan to help with pain and improve health. and Patient can continue with medications for now since they are providing benefits, using them appropriately, and without side effects.     Provide short course of Norco 5/325 mg Q 8 p.r.n., 21 tablets, 0 refills.  I reviewed the  and is consistent patient's history.  - Increase Gabapentin following titration schedule to achieve 600 mg TID or best tolerated dosage  -Begin Celebrex 100 mg BID for day time pain/inflammation  -Continue Flexeril 10 mg nightly        - Therapy:  Advised patient continue with activities as tolerated    - Psychological:  Discussed coping mechanisms to help address chronic pain issues    - Labs:  Reviewed    - Imaging: Reviewed available imaging with patient and answered any questions they had regarding study.      - Consults/Referrals:  None at this time    - Records:  Reviewed/Obtain old records from outside physicians and imaging    - Follow up visit:6 weeks    - Counseled patient regarding the importance of activity modification and physical  therapy    - This condition does not require this patient to take time off of work, and the primary goal of our Pain Management services is to improve the patient's functional capacity.    - Patient Questions: Answered all of the patient's questions regarding diagnosis, therapy, and treatment        The above plan and management options were discussed at length with patient. Patient is in agreement with the above and verbalized understanding.    I discussed the goals of interventional chronic pain management with the patient on today's visit.  I explained the utility of injections for diagnostic and therapeutic purposes.  We discussed a multimodal approach to pain including treating the patient's given worst pain at any given time.  We will use a systematic approach to addressing pain.  We will also adopt a multimodal approach that includes injections, adjuvant medications, physical therapy, at times psychiatry.  There may be a limited role for opioid use intermittently in the treatment of pain, more particularly for acute pain although no one approach can be used as a sole treatment modality.    I emphasized the importance of regular exercise, core strengthening and stretching, diet and weight loss as a cornerstone of long-term pain management.      Eduarda Tsang PA-C  Interventional Pain Management  [facility]    Disclaimer:  This note was prepared using voice recognition system and is likely to have sound alike errors that may have been overlooked even after proof reading.  Please call me with any questions

## 2023-05-02 ENCOUNTER — TELEPHONE (OUTPATIENT)
Dept: INTERNAL MEDICINE | Facility: CLINIC | Age: 48
End: 2023-05-02

## 2023-05-02 ENCOUNTER — LAB VISIT (OUTPATIENT)
Dept: LAB | Facility: HOSPITAL | Age: 48
End: 2023-05-02
Attending: FAMILY MEDICINE
Payer: COMMERCIAL

## 2023-05-02 ENCOUNTER — OFFICE VISIT (OUTPATIENT)
Dept: PAIN MEDICINE | Facility: CLINIC | Age: 48
End: 2023-05-02
Payer: COMMERCIAL

## 2023-05-02 ENCOUNTER — CLINICAL SUPPORT (OUTPATIENT)
Dept: INTERNAL MEDICINE | Facility: CLINIC | Age: 48
End: 2023-05-02
Payer: COMMERCIAL

## 2023-05-02 VITALS
DIASTOLIC BLOOD PRESSURE: 79 MMHG | HEIGHT: 71 IN | SYSTOLIC BLOOD PRESSURE: 139 MMHG | WEIGHT: 258.5 LBS | BODY MASS INDEX: 36.19 KG/M2 | HEART RATE: 76 BPM

## 2023-05-02 DIAGNOSIS — M54.16 LUMBAR RADICULOPATHY: Primary | ICD-10-CM

## 2023-05-02 DIAGNOSIS — E11.9 TYPE 2 DIABETES MELLITUS WITHOUT COMPLICATION, WITHOUT LONG-TERM CURRENT USE OF INSULIN: ICD-10-CM

## 2023-05-02 DIAGNOSIS — M47.816 LUMBAR SPONDYLOSIS: ICD-10-CM

## 2023-05-02 LAB
ALBUMIN SERPL BCP-MCNC: 4.4 G/DL (ref 3.5–5.2)
ALP SERPL-CCNC: 61 U/L (ref 55–135)
ALT SERPL W/O P-5'-P-CCNC: 34 U/L (ref 10–44)
ANION GAP SERPL CALC-SCNC: 6 MMOL/L (ref 8–16)
AST SERPL-CCNC: 24 U/L (ref 10–40)
BILIRUB SERPL-MCNC: 0.5 MG/DL (ref 0.1–1)
BUN SERPL-MCNC: 17 MG/DL (ref 6–20)
CALCIUM SERPL-MCNC: 9.3 MG/DL (ref 8.7–10.5)
CHLORIDE SERPL-SCNC: 100 MMOL/L (ref 95–110)
CHOLEST SERPL-MCNC: 168 MG/DL (ref 120–199)
CHOLEST/HDLC SERPL: 4.1 {RATIO} (ref 2–5)
CO2 SERPL-SCNC: 31 MMOL/L (ref 23–29)
CREAT SERPL-MCNC: 1 MG/DL (ref 0.5–1.4)
EST. GFR  (NO RACE VARIABLE): >60 ML/MIN/1.73 M^2
ESTIMATED AVG GLUCOSE: 177 MG/DL (ref 68–131)
GLUCOSE SERPL-MCNC: 215 MG/DL (ref 70–110)
HBA1C MFR BLD: 7.8 % (ref 4–5.6)
HDLC SERPL-MCNC: 41 MG/DL (ref 40–75)
HDLC SERPL: 24.4 % (ref 20–50)
LDLC SERPL CALC-MCNC: 94.6 MG/DL (ref 63–159)
NONHDLC SERPL-MCNC: 127 MG/DL
POTASSIUM SERPL-SCNC: 5.2 MMOL/L (ref 3.5–5.1)
PROT SERPL-MCNC: 7 G/DL (ref 6–8.4)
SODIUM SERPL-SCNC: 137 MMOL/L (ref 136–145)
TESTOST SERPL-MCNC: 226 NG/DL (ref 304–1227)
TRIGL SERPL-MCNC: 162 MG/DL (ref 30–150)

## 2023-05-02 PROCEDURE — 3075F SYST BP GE 130 - 139MM HG: CPT | Mod: CPTII,S$GLB,, | Performed by: PHYSICIAN ASSISTANT

## 2023-05-02 PROCEDURE — 99999 PR PBB SHADOW E&M-EST. PATIENT-LVL III: CPT | Mod: PBBFAC,,, | Performed by: PHYSICIAN ASSISTANT

## 2023-05-02 PROCEDURE — 99999 PR PBB SHADOW E&M-EST. PATIENT-LVL II: CPT | Mod: PBBFAC,,,

## 2023-05-02 PROCEDURE — 3075F PR MOST RECENT SYSTOLIC BLOOD PRESS GE 130-139MM HG: ICD-10-PCS | Mod: CPTII,S$GLB,, | Performed by: PHYSICIAN ASSISTANT

## 2023-05-02 PROCEDURE — 99999 PR PBB SHADOW E&M-EST. PATIENT-LVL III: ICD-10-PCS | Mod: PBBFAC,,, | Performed by: PHYSICIAN ASSISTANT

## 2023-05-02 PROCEDURE — 3078F PR MOST RECENT DIASTOLIC BLOOD PRESSURE < 80 MM HG: ICD-10-PCS | Mod: CPTII,S$GLB,, | Performed by: PHYSICIAN ASSISTANT

## 2023-05-02 PROCEDURE — 99999 PR PBB SHADOW E&M-EST. PATIENT-LVL II: ICD-10-PCS | Mod: PBBFAC,,,

## 2023-05-02 PROCEDURE — 96372 THER/PROPH/DIAG INJ SC/IM: CPT | Mod: S$GLB,,, | Performed by: FAMILY MEDICINE

## 2023-05-02 PROCEDURE — 99214 PR OFFICE/OUTPT VISIT, EST, LEVL IV, 30-39 MIN: ICD-10-PCS | Mod: S$GLB,,, | Performed by: PHYSICIAN ASSISTANT

## 2023-05-02 PROCEDURE — 1159F MED LIST DOCD IN RCRD: CPT | Mod: CPTII,S$GLB,, | Performed by: PHYSICIAN ASSISTANT

## 2023-05-02 PROCEDURE — 80053 COMPREHEN METABOLIC PANEL: CPT | Performed by: FAMILY MEDICINE

## 2023-05-02 PROCEDURE — 3078F DIAST BP <80 MM HG: CPT | Mod: CPTII,S$GLB,, | Performed by: PHYSICIAN ASSISTANT

## 2023-05-02 PROCEDURE — 80061 LIPID PANEL: CPT | Performed by: FAMILY MEDICINE

## 2023-05-02 PROCEDURE — 3051F PR MOST RECENT HEMOGLOBIN A1C LEVEL 7.0 - < 8.0%: ICD-10-PCS | Mod: CPTII,S$GLB,, | Performed by: PHYSICIAN ASSISTANT

## 2023-05-02 PROCEDURE — 96372 PR INJECTION,THERAP/PROPH/DIAG2ST, IM OR SUBCUT: ICD-10-PCS | Mod: S$GLB,,, | Performed by: FAMILY MEDICINE

## 2023-05-02 PROCEDURE — 3051F HG A1C>EQUAL 7.0%<8.0%: CPT | Mod: CPTII,S$GLB,, | Performed by: PHYSICIAN ASSISTANT

## 2023-05-02 PROCEDURE — 84403 ASSAY OF TOTAL TESTOSTERONE: CPT | Performed by: FAMILY MEDICINE

## 2023-05-02 PROCEDURE — 83036 HEMOGLOBIN GLYCOSYLATED A1C: CPT | Performed by: FAMILY MEDICINE

## 2023-05-02 PROCEDURE — 1160F PR REVIEW ALL MEDS BY PRESCRIBER/CLIN PHARMACIST DOCUMENTED: ICD-10-PCS | Mod: CPTII,S$GLB,, | Performed by: PHYSICIAN ASSISTANT

## 2023-05-02 PROCEDURE — 1159F PR MEDICATION LIST DOCUMENTED IN MEDICAL RECORD: ICD-10-PCS | Mod: CPTII,S$GLB,, | Performed by: PHYSICIAN ASSISTANT

## 2023-05-02 PROCEDURE — 99214 OFFICE O/P EST MOD 30 MIN: CPT | Mod: S$GLB,,, | Performed by: PHYSICIAN ASSISTANT

## 2023-05-02 PROCEDURE — 1160F RVW MEDS BY RX/DR IN RCRD: CPT | Mod: CPTII,S$GLB,, | Performed by: PHYSICIAN ASSISTANT

## 2023-05-02 PROCEDURE — 36415 COLL VENOUS BLD VENIPUNCTURE: CPT | Performed by: FAMILY MEDICINE

## 2023-05-02 PROCEDURE — 3008F BODY MASS INDEX DOCD: CPT | Mod: CPTII,S$GLB,, | Performed by: PHYSICIAN ASSISTANT

## 2023-05-02 PROCEDURE — 3008F PR BODY MASS INDEX (BMI) DOCUMENTED: ICD-10-PCS | Mod: CPTII,S$GLB,, | Performed by: PHYSICIAN ASSISTANT

## 2023-05-02 RX ORDER — CELECOXIB 100 MG/1
100 CAPSULE ORAL 2 TIMES DAILY PRN
Qty: 60 CAPSULE | Refills: 1 | Status: SHIPPED | OUTPATIENT
Start: 2023-05-02 | End: 2023-06-01

## 2023-05-02 RX ORDER — GABAPENTIN 300 MG/1
600 CAPSULE ORAL 3 TIMES DAILY
Qty: 90 CAPSULE | Refills: 2 | Status: SHIPPED | OUTPATIENT
Start: 2023-05-02 | End: 2023-07-18 | Stop reason: SDUPTHER

## 2023-05-02 RX ORDER — HYDROCODONE BITARTRATE AND ACETAMINOPHEN 5; 325 MG/1; MG/1
1 TABLET ORAL EVERY 6 HOURS PRN
Qty: 21 TABLET | Refills: 0 | Status: SHIPPED | OUTPATIENT
Start: 2023-05-02 | End: 2023-07-12 | Stop reason: SDUPTHER

## 2023-05-02 RX ORDER — CYCLOBENZAPRINE HCL 10 MG
10 TABLET ORAL NIGHTLY
COMMUNITY
Start: 2023-04-11 | End: 2023-05-02 | Stop reason: SDUPTHER

## 2023-05-02 RX ORDER — CYCLOBENZAPRINE HCL 10 MG
10 TABLET ORAL NIGHTLY
Qty: 30 TABLET | Refills: 1 | Status: SHIPPED | OUTPATIENT
Start: 2023-05-02 | End: 2023-07-12 | Stop reason: SDUPTHER

## 2023-05-02 RX ORDER — TESTOSTERONE CYPIONATE 200 MG/ML
300 INJECTION, SOLUTION INTRAMUSCULAR
Status: COMPLETED | OUTPATIENT
Start: 2023-05-02 | End: 2023-05-02

## 2023-05-02 RX ADMIN — TESTOSTERONE CYPIONATE 300 MG: 200 INJECTION, SOLUTION INTRAMUSCULAR at 10:05

## 2023-05-02 NOTE — PROGRESS NOTES
Patient came into clinic today for DepoTestosterone 300mg. Pt tolerated injection well. Pt will be seen on 05/09 by -- so no follow up scheduled for nurse visit.

## 2023-05-08 ENCOUNTER — TELEPHONE (OUTPATIENT)
Dept: INTERNAL MEDICINE | Facility: CLINIC | Age: 48
End: 2023-05-08
Payer: COMMERCIAL

## 2023-05-09 ENCOUNTER — OFFICE VISIT (OUTPATIENT)
Dept: INTERNAL MEDICINE | Facility: CLINIC | Age: 48
End: 2023-05-09
Payer: COMMERCIAL

## 2023-05-09 VITALS
SYSTOLIC BLOOD PRESSURE: 122 MMHG | OXYGEN SATURATION: 97 % | BODY MASS INDEX: 35.74 KG/M2 | TEMPERATURE: 97 F | HEART RATE: 110 BPM | WEIGHT: 255.31 LBS | DIASTOLIC BLOOD PRESSURE: 78 MMHG | HEIGHT: 71 IN

## 2023-05-09 DIAGNOSIS — E34.9 TESTOSTERONE DEFICIENCY: ICD-10-CM

## 2023-05-09 DIAGNOSIS — E11.9 TYPE 2 DIABETES MELLITUS WITHOUT COMPLICATION, WITHOUT LONG-TERM CURRENT USE OF INSULIN: Primary | ICD-10-CM

## 2023-05-09 DIAGNOSIS — E78.5 HYPERLIPIDEMIA, UNSPECIFIED HYPERLIPIDEMIA TYPE: ICD-10-CM

## 2023-05-09 PROCEDURE — 96372 PR INJECTION,THERAP/PROPH/DIAG2ST, IM OR SUBCUT: ICD-10-PCS | Mod: S$GLB,,, | Performed by: FAMILY MEDICINE

## 2023-05-09 PROCEDURE — 3051F HG A1C>EQUAL 7.0%<8.0%: CPT | Mod: CPTII,S$GLB,, | Performed by: FAMILY MEDICINE

## 2023-05-09 PROCEDURE — 3074F SYST BP LT 130 MM HG: CPT | Mod: CPTII,S$GLB,, | Performed by: FAMILY MEDICINE

## 2023-05-09 PROCEDURE — 3078F DIAST BP <80 MM HG: CPT | Mod: CPTII,S$GLB,, | Performed by: FAMILY MEDICINE

## 2023-05-09 PROCEDURE — 3074F PR MOST RECENT SYSTOLIC BLOOD PRESSURE < 130 MM HG: ICD-10-PCS | Mod: CPTII,S$GLB,, | Performed by: FAMILY MEDICINE

## 2023-05-09 PROCEDURE — 96372 THER/PROPH/DIAG INJ SC/IM: CPT | Mod: S$GLB,,, | Performed by: FAMILY MEDICINE

## 2023-05-09 PROCEDURE — 99214 OFFICE O/P EST MOD 30 MIN: CPT | Mod: 25,S$GLB,, | Performed by: FAMILY MEDICINE

## 2023-05-09 PROCEDURE — 3008F PR BODY MASS INDEX (BMI) DOCUMENTED: ICD-10-PCS | Mod: CPTII,S$GLB,, | Performed by: FAMILY MEDICINE

## 2023-05-09 PROCEDURE — 1159F PR MEDICATION LIST DOCUMENTED IN MEDICAL RECORD: ICD-10-PCS | Mod: CPTII,S$GLB,, | Performed by: FAMILY MEDICINE

## 2023-05-09 PROCEDURE — 1159F MED LIST DOCD IN RCRD: CPT | Mod: CPTII,S$GLB,, | Performed by: FAMILY MEDICINE

## 2023-05-09 PROCEDURE — 99999 PR PBB SHADOW E&M-EST. PATIENT-LVL IV: ICD-10-PCS | Mod: PBBFAC,,, | Performed by: FAMILY MEDICINE

## 2023-05-09 PROCEDURE — 3078F PR MOST RECENT DIASTOLIC BLOOD PRESSURE < 80 MM HG: ICD-10-PCS | Mod: CPTII,S$GLB,, | Performed by: FAMILY MEDICINE

## 2023-05-09 PROCEDURE — 99214 PR OFFICE/OUTPT VISIT, EST, LEVL IV, 30-39 MIN: ICD-10-PCS | Mod: 25,S$GLB,, | Performed by: FAMILY MEDICINE

## 2023-05-09 PROCEDURE — 3051F PR MOST RECENT HEMOGLOBIN A1C LEVEL 7.0 - < 8.0%: ICD-10-PCS | Mod: CPTII,S$GLB,, | Performed by: FAMILY MEDICINE

## 2023-05-09 PROCEDURE — 99999 PR PBB SHADOW E&M-EST. PATIENT-LVL IV: CPT | Mod: PBBFAC,,, | Performed by: FAMILY MEDICINE

## 2023-05-09 PROCEDURE — 3008F BODY MASS INDEX DOCD: CPT | Mod: CPTII,S$GLB,, | Performed by: FAMILY MEDICINE

## 2023-05-09 RX ORDER — TESTOSTERONE CYPIONATE 200 MG/ML
400 INJECTION, SOLUTION INTRAMUSCULAR
Status: ACTIVE | OUTPATIENT
Start: 2023-05-09 | End: 2023-10-24

## 2023-05-09 RX ADMIN — TESTOSTERONE CYPIONATE 400 MG: 200 INJECTION, SOLUTION INTRAMUSCULAR at 04:05

## 2023-05-09 NOTE — PROGRESS NOTES
454p--   Patient came into clinic today for Depo-Testosterone injection 400mg as ordered by . Scheduled next visit for next injection. Instructed pt to wait 15 minutes following injection for possible reaction. Pt verbalized understanding and tolerated well.

## 2023-05-10 NOTE — PROGRESS NOTES
Subjective:      Patient ID: Kendrick Nava is a 47 y.o. male.    Chief Complaint: Follow-up      Patient here for routine follow up on diabetes, HTN, hyperlipidemia, hypotestosteronism. Reviewed labs drawn recently today with the patient.   A1c is increased 7.8.  he does report some issues with getting his jardiance - was out for a few weeks prior to his blood draw.  Lipids above goal  Blood pressure controlled today.  Kidney function is stable.  Testosterone level< 300 - did have drawn immediately prior to next dose. He reports it feels like his testosterone level drops off very rapidly, doesn't feel normal more than a week.     Follow-up  Associated symptoms include arthralgias and fatigue.   Review of Systems   Constitutional:  Positive for fatigue. Negative for activity change and appetite change.   Musculoskeletal:  Positive for arthralgias and back pain.   Past Medical History:   Diagnosis Date    Diabetes mellitus, type 2     Hyperlipidemia     Testosterone deficiency     LOW Testosterone          Past Surgical History:   Procedure Laterality Date    EPIDURAL STEROID INJECTION N/A 2/2/2023    Procedure: Lumbar L5/S1 IL JONATAN with right paramedian approach RN IV Sedation;  Surgeon: Nico Angeles MD;  Location: [facility];  Service: Pain Management;  Laterality: N/A;    SELECTIVE INJECTION OF ANESTHETIC AGENT AROUND LUMBAR SPINAL NERVE ROOT BY TRANSFORAMINAL APPROACH Bilateral 4/4/2023    Procedure: Bilateral L5/S1 TF JONATAN;  Surgeon: Nico Angeles MD;  Location: [facility]GT;  Service: Pain Management;  Laterality: Bilateral;     Family History   Problem Relation Age of Onset    Diabetes type II Mother     Diabetes type II Father     Hypertension Father     Diabetes type II Brother     Cancer Maternal Grandmother     Diabetes Maternal Grandfather     No Known Problems Paternal Grandmother     Heart attack Paternal Grandfather      Social History     Socioeconomic History    Marital  "status:    Tobacco Use    Smoking status: Some Days     Types: Vaping with nicotine    Smokeless tobacco: Never   Substance and Sexual Activity    Alcohol use: Yes     Alcohol/week: 1.0 standard drink     Types: 1 Shots of liquor per week     Comment: once a month    Drug use: Never    Sexual activity: Yes     Partners: Male     Review of patient's allergies indicates:   Allergen Reactions    Lipitor [atorvastatin]      depression       Objective:       /78 (BP Location: Right arm, Patient Position: Sitting, BP Method: Large (Manual))   Pulse 110   Temp 97.1 °F (36.2 °C) (Tympanic)   Ht 5' 11" (1.803 m)   Wt 115.8 kg (255 lb 4.7 oz)   SpO2 97%   BMI 35.61 kg/m²   Physical Exam  Constitutional:       General: He is not in acute distress.     Appearance: Normal appearance. He is well-developed. He is not ill-appearing or diaphoretic.   Cardiovascular:      Rate and Rhythm: Normal rate and regular rhythm.      Heart sounds: Normal heart sounds.   Pulmonary:      Effort: Pulmonary effort is normal.      Breath sounds: Normal breath sounds.   Musculoskeletal:      Right lower leg: No edema.      Left lower leg: No edema.   Neurological:      General: No focal deficit present.      Mental Status: He is alert and oriented to person, place, and time.   Psychiatric:         Mood and Affect: Mood normal.         Behavior: Behavior normal.         Thought Content: Thought content normal.         Judgment: Judgment normal.     Assessment:     1. Type 2 diabetes mellitus without complication, without long-term current use of insulin    2. Hyperlipidemia, unspecified hyperlipidemia type    3. Testosterone deficiency      Plan:   Type 2 diabetes mellitus without complication, without long-term current use of insulin  -     Hemoglobin A1C; Future; Expected date: 08/07/2023  -     Lipid Panel; Future; Expected date: 08/07/2023  -     Comprehensive Metabolic Panel; Future; Expected date: " 08/07/2023    Hyperlipidemia, unspecified hyperlipidemia type    Testosterone deficiency  -     Testosterone; Future; Expected date: 08/07/2023    Other orders  -     testosterone cypionate injection 400 mg    Patient will work on dietary changes, wants to continue current diabetes/cholesterol meds  Above labs in 3 months prior to visit with me.    Medication List with Changes/Refills   Current Medications    CELECOXIB (CELEBREX) 100 MG CAPSULE    Take 1 capsule (100 mg total) by mouth 2 (two) times daily as needed for Pain.    CYCLOBENZAPRINE (FLEXERIL) 10 MG TABLET    Take 1 tablet (10 mg total) by mouth every evening.    EMPAGLIFLOZIN (JARDIANCE) 25 MG TABLET    Take 1 tablet (25 mg total) by mouth once daily.    GABAPENTIN (NEURONTIN) 300 MG CAPSULE    Take 2 capsules (600 mg total) by mouth 3 (three) times daily. Increase from 600 mg nightly to 600 mg three times daily by adding 1 additional tablet every 3 days as tolerated    HYDROCODONE-ACETAMINOPHEN (NORCO) 5-325 MG PER TABLET    Take 1 tablet by mouth every 6 (six) hours as needed for Pain.    METFORMIN (GLUCOPHAGE-XR) 750 MG ER 24HR TABLET    Take two tablets with breakfast and one in the evenings.    ROSUVASTATIN (CRESTOR) 20 MG TABLET    Take 1 tablet (20 mg total) by mouth once daily.

## 2023-05-19 ENCOUNTER — PATIENT OUTREACH (OUTPATIENT)
Dept: ADMINISTRATIVE | Facility: HOSPITAL | Age: 48
End: 2023-05-19
Payer: COMMERCIAL

## 2023-05-19 NOTE — PROGRESS NOTES
CAROL uploaded and faxed to  [facility] ([facility]) [phone] for eye exam.

## 2023-05-23 ENCOUNTER — CLINICAL SUPPORT (OUTPATIENT)
Dept: INTERNAL MEDICINE | Facility: CLINIC | Age: 48
End: 2023-05-23
Payer: COMMERCIAL

## 2023-05-23 PROCEDURE — 96372 PR INJECTION,THERAP/PROPH/DIAG2ST, IM OR SUBCUT: ICD-10-PCS | Mod: S$GLB,,, | Performed by: FAMILY MEDICINE

## 2023-05-23 PROCEDURE — 99999 PR PBB SHADOW E&M-EST. PATIENT-LVL II: CPT | Mod: PBBFAC,,,

## 2023-05-23 PROCEDURE — 99999 PR PBB SHADOW E&M-EST. PATIENT-LVL II: ICD-10-PCS | Mod: PBBFAC,,,

## 2023-05-23 PROCEDURE — 96372 THER/PROPH/DIAG INJ SC/IM: CPT | Mod: S$GLB,,, | Performed by: FAMILY MEDICINE

## 2023-05-23 RX ADMIN — TESTOSTERONE CYPIONATE 400 MG: 200 INJECTION, SOLUTION INTRAMUSCULAR at 03:05

## 2023-05-23 NOTE — PROGRESS NOTES
Patient came into clinic today for DepoTestosterone 400mg injection as ordered by . Pt instructed to wait 15 minutes following injection for possible reaction. Pt verbalized understanding and tolerated injection well. scheduled next nurse visit for next injection.

## 2023-06-02 NOTE — PROGRESS NOTES
I called [facility] ([facility]) 1x to request eye exam; I was informed staff that handles request is gone to lunch and return at 2pm. I will f/u at 2pm today.

## 2023-06-05 ENCOUNTER — CLINICAL SUPPORT (OUTPATIENT)
Dept: INTERNAL MEDICINE | Facility: CLINIC | Age: 48
End: 2023-06-05
Payer: COMMERCIAL

## 2023-06-05 PROCEDURE — 96372 PR INJECTION,THERAP/PROPH/DIAG2ST, IM OR SUBCUT: ICD-10-PCS | Mod: S$GLB,,, | Performed by: FAMILY MEDICINE

## 2023-06-05 PROCEDURE — 96372 THER/PROPH/DIAG INJ SC/IM: CPT | Mod: S$GLB,,, | Performed by: FAMILY MEDICINE

## 2023-06-05 RX ADMIN — TESTOSTERONE CYPIONATE 400 MG: 200 INJECTION, SOLUTION INTRAMUSCULAR at 03:06

## 2023-06-05 NOTE — PROGRESS NOTES
Patient came into clinic today for DepoTestosterone 400mg as ordered by . Pt instructed to wait 15 minutes following injection for possible reaction. Pt verbalized understanding and tolerated injection well. Next nurse visit scheduled with augustus Chu entered.

## 2023-06-13 NOTE — PROGRESS NOTES
I called [facility] ([facility]) to request eye exam; I was informed eye exam will be faxed over. I will f/u in 1 week if no record received.

## 2023-06-19 ENCOUNTER — CLINICAL SUPPORT (OUTPATIENT)
Dept: INTERNAL MEDICINE | Facility: CLINIC | Age: 48
End: 2023-06-19
Payer: COMMERCIAL

## 2023-06-19 PROCEDURE — 96372 PR INJECTION,THERAP/PROPH/DIAG2ST, IM OR SUBCUT: ICD-10-PCS | Mod: S$GLB,,, | Performed by: FAMILY MEDICINE

## 2023-06-19 PROCEDURE — 99999 PR PBB SHADOW E&M-EST. PATIENT-LVL II: ICD-10-PCS | Mod: PBBFAC,,,

## 2023-06-19 PROCEDURE — 99999 PR PBB SHADOW E&M-EST. PATIENT-LVL II: CPT | Mod: PBBFAC,,,

## 2023-06-19 PROCEDURE — 96372 THER/PROPH/DIAG INJ SC/IM: CPT | Mod: S$GLB,,, | Performed by: FAMILY MEDICINE

## 2023-06-19 RX ADMIN — TESTOSTERONE CYPIONATE 400 MG: 200 INJECTION, SOLUTION INTRAMUSCULAR at 09:06

## 2023-06-19 NOTE — PROGRESS NOTES
Administered testosterone 400 mg      into  RVG    . Patient tolerated well, no adverse reaction noted. Advise 15 minute wait.

## 2023-07-03 ENCOUNTER — CLINICAL SUPPORT (OUTPATIENT)
Dept: INTERNAL MEDICINE | Facility: CLINIC | Age: 48
End: 2023-07-03
Payer: COMMERCIAL

## 2023-07-03 PROCEDURE — 99999 PR PBB SHADOW E&M-EST. PATIENT-LVL II: ICD-10-PCS | Mod: PBBFAC,,,

## 2023-07-03 PROCEDURE — 96372 THER/PROPH/DIAG INJ SC/IM: CPT | Mod: S$GLB,,, | Performed by: FAMILY MEDICINE

## 2023-07-03 PROCEDURE — 96372 PR INJECTION,THERAP/PROPH/DIAG2ST, IM OR SUBCUT: ICD-10-PCS | Mod: S$GLB,,, | Performed by: FAMILY MEDICINE

## 2023-07-03 PROCEDURE — 99999 PR PBB SHADOW E&M-EST. PATIENT-LVL II: CPT | Mod: PBBFAC,,,

## 2023-07-03 RX ADMIN — TESTOSTERONE CYPIONATE 400 MG: 200 INJECTION, SOLUTION INTRAMUSCULAR at 09:07

## 2023-07-03 NOTE — PROGRESS NOTES
Patient came into clinic today for DepoTestosterone 400mg as ordered by . Pt instructed to wait 15 minutes following injection for possible reaction. Pt verbalized understanding and tolerated injection well. eNAR documented.

## 2023-07-11 ENCOUNTER — TELEPHONE (OUTPATIENT)
Dept: PAIN MEDICINE | Facility: CLINIC | Age: 48
End: 2023-07-11
Payer: COMMERCIAL

## 2023-07-11 ENCOUNTER — PATIENT MESSAGE (OUTPATIENT)
Dept: PAIN MEDICINE | Facility: CLINIC | Age: 48
End: 2023-07-11
Payer: COMMERCIAL

## 2023-07-11 NOTE — TELEPHONE ENCOUNTER
----- Message from Sofya Jordan sent at 7/11/2023  4:48 PM CDT -----  Contact: pt/[phone]  Type: Appointment Request    Caller is requesting a sooner appointment.  Caller declined first available appointment listed below.  Caller will not accept being placed on the waitlist and is requesting a message be sent to doctor.  Name of Caller:pt    When is the first available appointment?no  dates given   Would the patient rather a call back or a response via [facility]? Call   Best Call Back Number:[phone]  Additional Information: pt  is  looking to  reschedule  his  appointment

## 2023-07-12 DIAGNOSIS — M47.816 LUMBAR SPONDYLOSIS: ICD-10-CM

## 2023-07-12 DIAGNOSIS — M54.16 LUMBAR RADICULOPATHY: ICD-10-CM

## 2023-07-13 RX ORDER — HYDROCODONE BITARTRATE AND ACETAMINOPHEN 5; 325 MG/1; MG/1
1 TABLET ORAL EVERY 6 HOURS PRN
Qty: 21 TABLET | Refills: 0 | Status: SHIPPED | OUTPATIENT
Start: 2023-07-13 | End: 2023-08-30 | Stop reason: SDUPTHER

## 2023-07-13 RX ORDER — CYCLOBENZAPRINE HCL 10 MG
10 TABLET ORAL NIGHTLY
Qty: 30 TABLET | Refills: 1 | Status: SHIPPED | OUTPATIENT
Start: 2023-07-13 | End: 2023-07-18 | Stop reason: SDUPTHER

## 2023-07-13 NOTE — TELEPHONE ENCOUNTER
Good Afternoon,     Pt is requesting for a refill of: cyclobenzaprine (FLEXERIL) 10 MG tablet  Last filed:5/02/23  Last encounter: 5/02/23  Up coming apt: 7/18/23  Pharmacy:[id number] - [place], [place] - [address] AT [facility] [address]  Is this something you can do?

## 2023-07-13 NOTE — TELEPHONE ENCOUNTER
No care due was identified.  [facility] Embedded Care Due Messages. Reference number: [id number].   7/12/2023 9:18:19 PM CDT

## 2023-07-17 ENCOUNTER — TELEPHONE (OUTPATIENT)
Dept: PAIN MEDICINE | Facility: CLINIC | Age: 48
End: 2023-07-17
Payer: COMMERCIAL

## 2023-07-17 ENCOUNTER — CLINICAL SUPPORT (OUTPATIENT)
Dept: INTERNAL MEDICINE | Facility: CLINIC | Age: 48
End: 2023-07-17
Payer: COMMERCIAL

## 2023-07-17 PROCEDURE — 96372 PR INJECTION,THERAP/PROPH/DIAG2ST, IM OR SUBCUT: ICD-10-PCS | Mod: S$GLB,,, | Performed by: FAMILY MEDICINE

## 2023-07-17 PROCEDURE — 99999 PR PBB SHADOW E&M-EST. PATIENT-LVL II: ICD-10-PCS | Mod: PBBFAC,,,

## 2023-07-17 PROCEDURE — 96372 THER/PROPH/DIAG INJ SC/IM: CPT | Mod: S$GLB,,, | Performed by: FAMILY MEDICINE

## 2023-07-17 PROCEDURE — 99999 PR PBB SHADOW E&M-EST. PATIENT-LVL II: CPT | Mod: PBBFAC,,,

## 2023-07-17 RX ADMIN — TESTOSTERONE CYPIONATE 400 MG: 200 INJECTION, SOLUTION INTRAMUSCULAR at 01:07

## 2023-07-17 NOTE — PROGRESS NOTES
Patient came into clinic today for Depo-Testosterone 400mg as ordered by . Pt instructed to wait 15 minutes following injection for possible reaction. Pt verbalized understanding. eNAR documented.

## 2023-07-18 ENCOUNTER — OFFICE VISIT (OUTPATIENT)
Dept: PAIN MEDICINE | Facility: CLINIC | Age: 48
End: 2023-07-18
Payer: COMMERCIAL

## 2023-07-18 VITALS
HEIGHT: 71 IN | WEIGHT: 258.94 LBS | HEART RATE: 88 BPM | BODY MASS INDEX: 36.25 KG/M2 | SYSTOLIC BLOOD PRESSURE: 124 MMHG | DIASTOLIC BLOOD PRESSURE: 79 MMHG

## 2023-07-18 DIAGNOSIS — G89.29 CHRONIC BILATERAL LOW BACK PAIN WITHOUT SCIATICA: ICD-10-CM

## 2023-07-18 DIAGNOSIS — M54.16 LUMBAR RADICULOPATHY: Primary | ICD-10-CM

## 2023-07-18 DIAGNOSIS — M54.50 CHRONIC BILATERAL LOW BACK PAIN WITHOUT SCIATICA: ICD-10-CM

## 2023-07-18 PROCEDURE — 99214 PR OFFICE/OUTPT VISIT, EST, LEVL IV, 30-39 MIN: ICD-10-PCS | Mod: S$GLB,,, | Performed by: PHYSICIAN ASSISTANT

## 2023-07-18 PROCEDURE — 3078F PR MOST RECENT DIASTOLIC BLOOD PRESSURE < 80 MM HG: ICD-10-PCS | Mod: CPTII,S$GLB,, | Performed by: PHYSICIAN ASSISTANT

## 2023-07-18 PROCEDURE — 99214 OFFICE O/P EST MOD 30 MIN: CPT | Mod: S$GLB,,, | Performed by: PHYSICIAN ASSISTANT

## 2023-07-18 PROCEDURE — 3074F SYST BP LT 130 MM HG: CPT | Mod: CPTII,S$GLB,, | Performed by: PHYSICIAN ASSISTANT

## 2023-07-18 PROCEDURE — 3008F PR BODY MASS INDEX (BMI) DOCUMENTED: ICD-10-PCS | Mod: CPTII,S$GLB,, | Performed by: PHYSICIAN ASSISTANT

## 2023-07-18 PROCEDURE — 3051F PR MOST RECENT HEMOGLOBIN A1C LEVEL 7.0 - < 8.0%: ICD-10-PCS | Mod: CPTII,S$GLB,, | Performed by: PHYSICIAN ASSISTANT

## 2023-07-18 PROCEDURE — 1159F PR MEDICATION LIST DOCUMENTED IN MEDICAL RECORD: ICD-10-PCS | Mod: CPTII,S$GLB,, | Performed by: PHYSICIAN ASSISTANT

## 2023-07-18 PROCEDURE — 1159F MED LIST DOCD IN RCRD: CPT | Mod: CPTII,S$GLB,, | Performed by: PHYSICIAN ASSISTANT

## 2023-07-18 PROCEDURE — 99999 PR PBB SHADOW E&M-EST. PATIENT-LVL III: ICD-10-PCS | Mod: PBBFAC,,, | Performed by: PHYSICIAN ASSISTANT

## 2023-07-18 PROCEDURE — 3008F BODY MASS INDEX DOCD: CPT | Mod: CPTII,S$GLB,, | Performed by: PHYSICIAN ASSISTANT

## 2023-07-18 PROCEDURE — 1160F PR REVIEW ALL MEDS BY PRESCRIBER/CLIN PHARMACIST DOCUMENTED: ICD-10-PCS | Mod: CPTII,S$GLB,, | Performed by: PHYSICIAN ASSISTANT

## 2023-07-18 PROCEDURE — 3078F DIAST BP <80 MM HG: CPT | Mod: CPTII,S$GLB,, | Performed by: PHYSICIAN ASSISTANT

## 2023-07-18 PROCEDURE — 1160F RVW MEDS BY RX/DR IN RCRD: CPT | Mod: CPTII,S$GLB,, | Performed by: PHYSICIAN ASSISTANT

## 2023-07-18 PROCEDURE — 3074F PR MOST RECENT SYSTOLIC BLOOD PRESSURE < 130 MM HG: ICD-10-PCS | Mod: CPTII,S$GLB,, | Performed by: PHYSICIAN ASSISTANT

## 2023-07-18 PROCEDURE — 99999 PR PBB SHADOW E&M-EST. PATIENT-LVL III: CPT | Mod: PBBFAC,,, | Performed by: PHYSICIAN ASSISTANT

## 2023-07-18 PROCEDURE — 3051F HG A1C>EQUAL 7.0%<8.0%: CPT | Mod: CPTII,S$GLB,, | Performed by: PHYSICIAN ASSISTANT

## 2023-07-18 RX ORDER — GABAPENTIN 300 MG/1
600 CAPSULE ORAL 3 TIMES DAILY
Qty: 90 CAPSULE | Refills: 2 | Status: SHIPPED | OUTPATIENT
Start: 2023-07-18 | End: 2023-08-30 | Stop reason: SDUPTHER

## 2023-07-18 RX ORDER — CELECOXIB 100 MG/1
100 CAPSULE ORAL 2 TIMES DAILY PRN
Qty: 60 CAPSULE | Refills: 2 | Status: SHIPPED | OUTPATIENT
Start: 2023-07-18 | End: 2023-10-18 | Stop reason: SDUPTHER

## 2023-07-18 RX ORDER — CYCLOBENZAPRINE HCL 10 MG
10 TABLET ORAL NIGHTLY
Qty: 30 TABLET | Refills: 1 | Status: SHIPPED | OUTPATIENT
Start: 2023-07-18 | End: 2023-08-30 | Stop reason: SDUPTHER

## 2023-07-18 NOTE — PROGRESS NOTES
Est Patient Chronic Pain Note (Follow up Visit)      Chief Complaint:   Chief Complaint   Patient presents with    Low-back Pain        SUBJECTIVE:  Interval History (7/18/2023):  Kendrick Nava presents today for follow-up visit.  Patient was last seen on 5/2/2023. He reports his leg pain is well-controlled with Gabapentin, he takes 900 mg in the morning and 900 mg at night. Continues to take Celebrex daily and Flexeril nightly with good relief, Norco sparingly for severe. Pain. Reports right sided pain in his lower back that wraps around into his stomach x 3 weeks. Patient reports pain as 4/10 today.    Interval History (5/1/2023): Kendrick Nava presents today for follow-up visit.  he underwent bilateral L5/S1 TF JONATAN on 4/4/23.  The patient reports that he is/was better following the procedure.  he reports initially experienced worsening pain for about a week after the procedure, followed by  95% pain relief x 1 week before relief dwindled to 20%. Reports continued pressure in his lower back and pain across the lumbosacral region in a band like distribution with radiation into his right lower extremity along L5/S1 distribution. Takes Gabapentin and Flexeril nightly with good relief, no side effects. Norco for severe pain.  Patient reports pain as 6/10 today.    Interval HPI 03/13/2023  Kendrick Nava is a 47 y.o. male presents today for injection follow-up.  He was last seen for L5-S1 IL JONATAN on 02/02/2023.  He reports that this resulted in 80+ % relief for the initial 2 weeks, but now has decreased to about 50-60% relief.  He feels that the pain is of the same type and quality and is in the same location..      Patient denies night fever/night sweats, urinary incontinence, bowel incontinence, significant weight loss, significant motor weakness, and loss of sensations.    Pain Disability Index Review:  Last 3 PDI Scores 7/18/2023 1/13/2023 8/29/2022   Pain Disability Index (PDI) 34 50 48       Interval  History (1/13/2023):  Kendrick Nava presents today for follow-up visit.  Patient was last seen on Visit date not found. Patient reports pain as 5/10 today.  He has been performing physician directed exercises targeting sciatic nerve pain for several weeks without improvement. He reports continued pain in his thoracolumbar region but his worst pain is persistently along his lumbosacral region in a band-like distribution with radiation into his right lower extremity along L5/S1 distribution. Reports occasional radiation into his left leg. He installs italo for a living and reports prolonged bending and stooping worsens his symptoms. He reports by the end of the day he walks with a limp.      Initial HPI 08/29/2022  Kendrick Nava is a 47 y.o. male who presents to the clinic for the evaluation of back pain.  He was referred by his primary care provider for further evaluation and management of this pain.  The pain started a few years ago following a fall from a ladder in 2018 and symptoms have been unchanged.The pain is located in the right thoracolumbar area and radiates to the lumbosacral region with occasional radiation into the left lower extremity extending to the foot and ankle.  The pain is described as aching, numbness, tingling burning and is rated as 6/10. The pain is rated with a score of  4/10 on the BEST day and a score of 8/10 on the WORST day.  Symptoms interfere with daily activity. The pain is exacerbated by work activities, prolonged standing or sitting.  The pain is mitigated by activity modification.       Non-Pharmacologic Treatments:  Physical Therapy/Home Exercise: yes  Ice/Heat:yes  TENS: no  Acupuncture: no  Massage: yes  Chiropractic: yes    Other: no      Pain Medications:  - Opioids:  Tramadol  - Adjuvant Medications:  Mobic  - Anti-Coagulants:  None     report:  Reviewed and consistent with medication use as prescribed.      Pain Procedures:   -02/02/2023:  L5-S1 IL JONATAN, 80%  relief for the initial 2 weeks with continued 50-60% relief  - bilateral L5/S1 TF JONATAN on 4/4/23 with 95% relief x 1 week, then 20% relief    Imaging:   Outside CT and MRI of the thoracic lumbar spine reviewed 2018    CT lumbar spine 12/02/2022     FINDINGS:  No acute fracture or dislocation.  Moderate disc height loss T11-12 and T12-L1.  Mild disc height loss at L1-2 through L4-5.  Mild facet arthropathy.  No significant endplate sclerosis.  Mild Schmorl's node formation T12-L1 and L1-2.     SI joints unremarkable.  Mild aortoiliac atherosclerotic calcification.     Impression:     No acute abnormality identified.  Chronic mild discogenic degenerative change as described above    CT lumbar spine 07/22/2018  1.  Acute, traumatic fractures involving the left L2, left L3, and left L4 transverse processes.     2.  Multilevel lumbar degenerative disc disease, spondylosis, and stenoses, as discussed above. This is probably most prominent at L5-S1 on the right, with there is mass effect on and posterior displacement of the right S1 nerve root within the lateral recess, with right lateral recess stenosis.    X-ray lumbar spine 12/02/2021:  The vertebral bodies of the lumbar spine demonstrate a normal height and alignment.  There appears to be mild chronic vertebral body height loss at the T11 and T12 levels with some spondylosis noted at these levels.  The disc space heights appear to be relatively well maintained.  Mild spondylosis noted anteriorly at the L3-4 and to a lesser degree the L4-5 levels.  No pars defects are noted.       Past Medical History:   Diagnosis Date    Diabetes mellitus, type 2     Hyperlipidemia     Testosterone deficiency     LOW Testosterone     Past Surgical History:   Procedure Laterality Date    EPIDURAL STEROID INJECTION N/A 2/2/2023    Procedure: Lumbar L5/S1 IL JONATAN with right paramedian approach RN IV Sedation;  Surgeon: Nico Angeles MD;  Location: [facility];  Service: Pain  Management;  Laterality: N/A;    SELECTIVE INJECTION OF ANESTHETIC AGENT AROUND LUMBAR SPINAL NERVE ROOT BY TRANSFORAMINAL APPROACH Bilateral 4/4/2023    Procedure: Bilateral L5/S1 TF JONATAN;  Surgeon: Nico Angeles MD;  Location: [facility]GT;  Service: Pain Management;  Laterality: Bilateral;     Social History     Socioeconomic History    Marital status:    Tobacco Use    Smoking status: Some Days     Types: Vaping with nicotine    Smokeless tobacco: Never   Substance and Sexual Activity    Alcohol use: Yes     Alcohol/week: 1.0 standard drink     Types: 1 Shots of liquor per week     Comment: once a month    Drug use: Never    Sexual activity: Yes     Partners: Male     Family History   Problem Relation Age of Onset    Diabetes type II Mother     Diabetes type II Father     Hypertension Father     Diabetes type II Brother     Cancer Maternal Grandmother     Diabetes Maternal Grandfather     No Known Problems Paternal Grandmother     Heart attack Paternal Grandfather        Review of patient's allergies indicates:   Allergen Reactions    Lipitor [atorvastatin]      depression       Current Outpatient Medications   Medication Sig    cyclobenzaprine (FLEXERIL) 10 MG tablet Take 1 tablet (10 mg total) by mouth every evening.    empagliflozin (JARDIANCE) 25 mg tablet Take 1 tablet (25 mg total) by mouth once daily.    gabapentin (NEURONTIN) 300 MG capsule Take 2 capsules (600 mg total) by mouth 3 (three) times daily. Increase from 600 mg nightly to 600 mg three times daily by adding 1 additional tablet every 3 days as tolerated    HYDROcodone-acetaminophen (NORCO) 5-325 mg per tablet Take 1 tablet by mouth every 6 (six) hours as needed for Pain.    metFORMIN (GLUCOPHAGE-XR) 750 MG ER 24hr tablet Take two tablets with breakfast and one in the evenings.    rosuvastatin (CRESTOR) 20 MG tablet Take 1 tablet (20 mg total) by mouth once daily.     Current Facility-Administered Medications   Medication     "testosterone cypionate injection 400 mg       Review of Systems     GENERAL:  No weight loss, malaise or fevers.  HEENT:   No recent changes in vision or hearing  NECK:  Negative for lumps, no difficulty with swallowing.  RESPIRATORY:  Negative for cough, wheezing or shortness of breath, patient denies any recent URI.  CARDIOVASCULAR:  Negative for chest pain, leg swelling or palpitations.  GI:  Negative for abdominal discomfort, blood in stools or black stools or change in bowel habits.  MUSCULOSKELETAL:  See HPI.  SKIN:  Negative for lesions, rash, and itching.  PSYCH:  No mood disorder or recent psychosocial stressors.  Patients sleep is not disturbed secondary to pain.  HEMATOLOGY/LYMPHOLOGY:  Negative for prolonged bleeding, bruising easily or swollen nodes.  Patient is not currently taking any anti-coagulants  NEURO:   No history of headaches, syncope, paralysis, seizures or tremors.  All other reviewed and negative other than HPI.    OBJECTIVE:    /79   Pulse 88   Ht 5' 11" (1.803 m)   Wt 117.4 kg (258 lb 14.9 oz)   BMI 36.11 kg/m²         Physical Exam    GENERAL: Well appearing, in no acute distress, alert and oriented x3.  PSYCH:  Mood and affect appropriate.  SKIN: Skin color, texture, turgor normal, no rashes or lesions.  HEAD/FACE:  Normocephalic, atraumatic. Cranial nerves grossly intact.  CV: RRR with palpation of the radial artery.  PULM: No evidence of respiratory difficulty, symmetric chest rise.  GI:  Soft and non-tender, negative heredia's sign, negative Mcburney's sign, negative CVA tenderness  BACK: Straight leg raising in the sitting and supine positions is positive to radicular pain on the right, equivocal to radicular pain on the left.  Moderate pain to palpation over the facet joints of the lumbar spine and lumbar paraspinals, mostly on the left   Limited range of motion secondary to pain reproduction.  EXTREMITIES: Peripheral joint ROM is full and pain free without obvious " instability or laxity in all four extremities. No deformities, edema, or skin discoloration. Good capillary refill.  MUSCULOSKELETAL:  Hip and knee provocative maneuvers are negative.  There is no pain with palpation over the sacroiliac joints bilaterally. Moderated tenderness along right quadratus lumborum. FABERs test is negative.  FADIRs test is negative.   Bilateral upper and lower extremity strength is normal and symmetric.  No atrophy or tone abnormalities are noted.  NEURO: Bilateral upper and lower extremity coordination and muscle stretch reflexes are physiologic and symmetric.  Plantar response are downgoing. No clonus.  No loss of sensation is noted.  GAIT: normal.      LABS:  Lab Results   Component Value Date    WBC 7.86 02/16/2023    HGB 14.9 02/16/2023    HCT 45.5 02/16/2023    MCV 86 02/16/2023     02/16/2023       CMP  Sodium   Date Value Ref Range Status   05/02/2023 137 136 - 145 mmol/L Final     Potassium   Date Value Ref Range Status   05/02/2023 5.2 (H) 3.5 - 5.1 mmol/L Final     Chloride   Date Value Ref Range Status   05/02/2023 100 95 - 110 mmol/L Final     CO2   Date Value Ref Range Status   05/02/2023 31 (H) 23 - 29 mmol/L Final     Glucose   Date Value Ref Range Status   05/02/2023 215 (H) 70 - 110 mg/dL Final     BUN   Date Value Ref Range Status   05/02/2023 17 6 - 20 mg/dL Final     Creatinine   Date Value Ref Range Status   05/02/2023 1.0 0.5 - 1.4 mg/dL Final     Calcium   Date Value Ref Range Status   05/02/2023 9.3 8.7 - 10.5 mg/dL Final     Total Protein   Date Value Ref Range Status   05/02/2023 7.0 6.0 - 8.4 g/dL Final     Albumin   Date Value Ref Range Status   05/02/2023 4.4 3.5 - 5.2 g/dL Final   11/04/2022 4.8 3.6 - 5.1 g/dL Final     Total Bilirubin   Date Value Ref Range Status   05/02/2023 0.5 0.1 - 1.0 mg/dL Final     Comment:     For infants and newborns, interpretation of results should be based  on gestational age, weight and in agreement with  clinical  observations.    Premature Infant recommended reference ranges:  Up to 24 hours.............<8.0 mg/dL  Up to 48 hours............<12.0 mg/dL  3-5 days..................<15.0 mg/dL  6-29 days.................<15.0 mg/dL       Alkaline Phosphatase   Date Value Ref Range Status   05/02/2023 61 55 - 135 U/L Final     AST   Date Value Ref Range Status   05/02/2023 24 10 - 40 U/L Final     ALT   Date Value Ref Range Status   05/02/2023 34 10 - 44 U/L Final     Anion Gap   Date Value Ref Range Status   05/02/2023 6 (L) 8 - 16 mmol/L Final     eGFR if    Date Value Ref Range Status   06/06/2022 >60.0 >60 mL/min/1.73 m^2 Final     eGFR if non    Date Value Ref Range Status   06/06/2022 >60.0 >60 mL/min/1.73 m^2 Final     Comment:     Calculation used to obtain the estimated glomerular filtration  rate (eGFR) is the CKD-EPI equation.          Lab Results   Component Value Date    HGBA1C 7.8 (H) 05/02/2023             ASSESSMENT: 47 y.o. year old male with chronic lower back pain, consistent with     No diagnosis found.              PLAN:   - Interventions: None at this time, hold off on injection for now, NSG as last resort as patient would like to avoid surgery  -s/p  - bilateral L5/S1 TF JONATAN on 4/4/23 with 95% relief x 1 week, then 20% relief  s/p L5/S1 IL JONATAN with 80% relief with the initial 2 weeks.      - Anticoagulation use: no       - Medications: I have stressed the importance of physical activity and a home exercise plan to help with pain and improve health. and Patient can continue with medications for now since they are providing benefits, using them appropriately, and without side effects.     Norco 5/325 mg Q 8 p.r.n., 21 tablets filled on 07/13/2023 for severe pain.  I reviewed the  and is consistent patient's history.  - Continue Gabapentin 900 mg BID  -Continue Celebrex 100 mg BID for day time pain/inflammation  -Continue Flexeril 10 mg nightly        - Therapy:   Advised patient continue with activities as tolerated    - Psychological:  Discussed coping mechanisms to help address chronic pain issues    - Labs:  Reviewed    - Imaging: Reviewed available imaging with patient and answered any questions they had regarding study.      - Consults/Referrals:  None at this time    - Records:  Reviewed/Obtain old records from outside physicians and imaging    - Follow up visit 3 months or PRN    - Counseled patient regarding the importance of activity modification and physical therapy    - This condition does not require this patient to take time off of work, and the primary goal of our Pain Management services is to improve the patient's functional capacity.    - Patient Questions: Answered all of the patient's questions regarding diagnosis, therapy, and treatment        The above plan and management options were discussed at length with patient. Patient is in agreement with the above and verbalized understanding.    I discussed the goals of interventional chronic pain management with the patient on today's visit.  I explained the utility of injections for diagnostic and therapeutic purposes.  We discussed a multimodal approach to pain including treating the patient's given worst pain at any given time.  We will use a systematic approach to addressing pain.  We will also adopt a multimodal approach that includes injections, adjuvant medications, physical therapy, at times psychiatry.  There may be a limited role for opioid use intermittently in the treatment of pain, more particularly for acute pain although no one approach can be used as a sole treatment modality.    I emphasized the importance of regular exercise, core strengthening and stretching, diet and weight loss as a cornerstone of long-term pain management.      Eduarda Tsang PA-C  Interventional Pain Management  [facility]    Disclaimer:  This note was prepared using voice recognition system and is  likely to have sound alike errors that may have been overlooked even after proof reading.  Please call me with any questions

## 2023-07-31 ENCOUNTER — CLINICAL SUPPORT (OUTPATIENT)
Dept: INTERNAL MEDICINE | Facility: CLINIC | Age: 48
End: 2023-07-31
Payer: COMMERCIAL

## 2023-07-31 PROCEDURE — 99999 PR PBB SHADOW E&M-EST. PATIENT-LVL II: ICD-10-PCS | Mod: PBBFAC,,,

## 2023-07-31 PROCEDURE — 99999 PR PBB SHADOW E&M-EST. PATIENT-LVL II: CPT | Mod: PBBFAC,,,

## 2023-07-31 PROCEDURE — 96372 THER/PROPH/DIAG INJ SC/IM: CPT | Mod: S$GLB,,, | Performed by: FAMILY MEDICINE

## 2023-07-31 PROCEDURE — 96372 PR INJECTION,THERAP/PROPH/DIAG2ST, IM OR SUBCUT: ICD-10-PCS | Mod: S$GLB,,, | Performed by: FAMILY MEDICINE

## 2023-07-31 RX ADMIN — TESTOSTERONE CYPIONATE 400 MG: 200 INJECTION, SOLUTION INTRAMUSCULAR at 03:07

## 2023-07-31 NOTE — PROGRESS NOTES
Patient came into clinic Depo-Testosterone 400mg as ordered by . Pt instructed to wait 15 minutes following injection for possible reaction. pt verbalized understanding and tolerated well.     Lot # [id number]   2025    eNAR sent.

## 2023-08-08 ENCOUNTER — PATIENT OUTREACH (OUTPATIENT)
Dept: ADMINISTRATIVE | Facility: HOSPITAL | Age: 48
End: 2023-08-08
Payer: COMMERCIAL

## 2023-08-08 DIAGNOSIS — E11.9 TYPE 2 DIABETES MELLITUS WITHOUT COMPLICATION, WITHOUT LONG-TERM CURRENT USE OF INSULIN: Primary | ICD-10-CM

## 2023-08-08 NOTE — PROGRESS NOTES
Working Diabetic Lab.     Pt has lab appt scheduled, 8/09/23.  Micro albumin urine ordered and linked to appt.

## 2023-08-09 ENCOUNTER — LAB VISIT (OUTPATIENT)
Dept: LAB | Facility: HOSPITAL | Age: 48
End: 2023-08-09
Attending: FAMILY MEDICINE
Payer: COMMERCIAL

## 2023-08-09 DIAGNOSIS — E34.9 TESTOSTERONE DEFICIENCY: ICD-10-CM

## 2023-08-09 DIAGNOSIS — E11.9 TYPE 2 DIABETES MELLITUS WITHOUT COMPLICATION, WITHOUT LONG-TERM CURRENT USE OF INSULIN: ICD-10-CM

## 2023-08-09 LAB
ALBUMIN SERPL BCP-MCNC: 4.5 G/DL (ref 3.5–5.2)
ALBUMIN/CREAT UR: 93.5 UG/MG (ref 0–30)
ALP SERPL-CCNC: 56 U/L (ref 55–135)
ALT SERPL W/O P-5'-P-CCNC: 33 U/L (ref 10–44)
ANION GAP SERPL CALC-SCNC: 10 MMOL/L (ref 8–16)
AST SERPL-CCNC: 26 U/L (ref 10–40)
BILIRUB SERPL-MCNC: 0.6 MG/DL (ref 0.1–1)
BUN SERPL-MCNC: 13 MG/DL (ref 6–20)
CALCIUM SERPL-MCNC: 9.3 MG/DL (ref 8.7–10.5)
CHLORIDE SERPL-SCNC: 102 MMOL/L (ref 95–110)
CHOLEST SERPL-MCNC: 128 MG/DL (ref 120–199)
CHOLEST/HDLC SERPL: 3.7 {RATIO} (ref 2–5)
CO2 SERPL-SCNC: 28 MMOL/L (ref 23–29)
CREAT SERPL-MCNC: 1.2 MG/DL (ref 0.5–1.4)
CREAT UR-MCNC: 77 MG/DL (ref 23–375)
EST. GFR  (NO RACE VARIABLE): >60 ML/MIN/1.73 M^2
ESTIMATED AVG GLUCOSE: 174 MG/DL (ref 68–131)
GLUCOSE SERPL-MCNC: 171 MG/DL (ref 70–110)
HBA1C MFR BLD: 7.7 % (ref 4–5.6)
HDLC SERPL-MCNC: 35 MG/DL (ref 40–75)
HDLC SERPL: 27.3 % (ref 20–50)
LDLC SERPL CALC-MCNC: 64.8 MG/DL (ref 63–159)
MICROALBUMIN UR DL<=1MG/L-MCNC: 72 UG/ML
NONHDLC SERPL-MCNC: 93 MG/DL
POTASSIUM SERPL-SCNC: 4.8 MMOL/L (ref 3.5–5.1)
PROT SERPL-MCNC: 7.4 G/DL (ref 6–8.4)
SODIUM SERPL-SCNC: 140 MMOL/L (ref 136–145)
TRIGL SERPL-MCNC: 141 MG/DL (ref 30–150)

## 2023-08-09 PROCEDURE — 82570 ASSAY OF URINE CREATININE: CPT | Performed by: FAMILY MEDICINE

## 2023-08-09 PROCEDURE — 36415 COLL VENOUS BLD VENIPUNCTURE: CPT | Mod: PO | Performed by: FAMILY MEDICINE

## 2023-08-09 PROCEDURE — 84403 ASSAY OF TOTAL TESTOSTERONE: CPT | Performed by: FAMILY MEDICINE

## 2023-08-09 PROCEDURE — 80053 COMPREHEN METABOLIC PANEL: CPT | Performed by: FAMILY MEDICINE

## 2023-08-09 PROCEDURE — 80061 LIPID PANEL: CPT | Performed by: FAMILY MEDICINE

## 2023-08-09 PROCEDURE — 83036 HEMOGLOBIN GLYCOSYLATED A1C: CPT | Performed by: FAMILY MEDICINE

## 2023-08-10 LAB — TESTOST SERPL-MCNC: 723 NG/DL (ref 304–1227)

## 2023-08-25 ENCOUNTER — OFFICE VISIT (OUTPATIENT)
Dept: INTERNAL MEDICINE | Facility: CLINIC | Age: 48
End: 2023-08-25
Payer: COMMERCIAL

## 2023-08-25 VITALS
OXYGEN SATURATION: 97 % | SYSTOLIC BLOOD PRESSURE: 118 MMHG | HEIGHT: 71 IN | TEMPERATURE: 97 F | BODY MASS INDEX: 35.83 KG/M2 | HEART RATE: 106 BPM | DIASTOLIC BLOOD PRESSURE: 68 MMHG | WEIGHT: 255.94 LBS

## 2023-08-25 DIAGNOSIS — E34.9 TESTOSTERONE DEFICIENCY: ICD-10-CM

## 2023-08-25 DIAGNOSIS — E78.5 HYPERLIPIDEMIA, UNSPECIFIED HYPERLIPIDEMIA TYPE: ICD-10-CM

## 2023-08-25 DIAGNOSIS — E11.9 TYPE 2 DIABETES MELLITUS WITHOUT COMPLICATION, WITHOUT LONG-TERM CURRENT USE OF INSULIN: Primary | ICD-10-CM

## 2023-08-25 PROCEDURE — 3074F PR MOST RECENT SYSTOLIC BLOOD PRESSURE < 130 MM HG: ICD-10-PCS | Mod: CPTII,S$GLB,, | Performed by: FAMILY MEDICINE

## 2023-08-25 PROCEDURE — 3051F PR MOST RECENT HEMOGLOBIN A1C LEVEL 7.0 - < 8.0%: ICD-10-PCS | Mod: CPTII,S$GLB,, | Performed by: FAMILY MEDICINE

## 2023-08-25 PROCEDURE — 3051F HG A1C>EQUAL 7.0%<8.0%: CPT | Mod: CPTII,S$GLB,, | Performed by: FAMILY MEDICINE

## 2023-08-25 PROCEDURE — 1159F PR MEDICATION LIST DOCUMENTED IN MEDICAL RECORD: ICD-10-PCS | Mod: CPTII,S$GLB,, | Performed by: FAMILY MEDICINE

## 2023-08-25 PROCEDURE — 99999 PR PBB SHADOW E&M-EST. PATIENT-LVL IV: ICD-10-PCS | Mod: PBBFAC,,, | Performed by: FAMILY MEDICINE

## 2023-08-25 PROCEDURE — 3008F PR BODY MASS INDEX (BMI) DOCUMENTED: ICD-10-PCS | Mod: CPTII,S$GLB,, | Performed by: FAMILY MEDICINE

## 2023-08-25 PROCEDURE — 3066F NEPHROPATHY DOC TX: CPT | Mod: CPTII,S$GLB,, | Performed by: FAMILY MEDICINE

## 2023-08-25 PROCEDURE — 96372 PR INJECTION,THERAP/PROPH/DIAG2ST, IM OR SUBCUT: ICD-10-PCS | Mod: S$GLB,,, | Performed by: FAMILY MEDICINE

## 2023-08-25 PROCEDURE — 99214 PR OFFICE/OUTPT VISIT, EST, LEVL IV, 30-39 MIN: ICD-10-PCS | Mod: 25,S$GLB,, | Performed by: FAMILY MEDICINE

## 2023-08-25 PROCEDURE — 3078F DIAST BP <80 MM HG: CPT | Mod: CPTII,S$GLB,, | Performed by: FAMILY MEDICINE

## 2023-08-25 PROCEDURE — 3074F SYST BP LT 130 MM HG: CPT | Mod: CPTII,S$GLB,, | Performed by: FAMILY MEDICINE

## 2023-08-25 PROCEDURE — 3008F BODY MASS INDEX DOCD: CPT | Mod: CPTII,S$GLB,, | Performed by: FAMILY MEDICINE

## 2023-08-25 PROCEDURE — 99214 OFFICE O/P EST MOD 30 MIN: CPT | Mod: 25,S$GLB,, | Performed by: FAMILY MEDICINE

## 2023-08-25 PROCEDURE — 3066F PR DOCUMENTATION OF TREATMENT FOR NEPHROPATHY: ICD-10-PCS | Mod: CPTII,S$GLB,, | Performed by: FAMILY MEDICINE

## 2023-08-25 PROCEDURE — 3060F PR POS MICROALBUMINURIA RESULT DOCUMENTED/REVIEW: ICD-10-PCS | Mod: CPTII,S$GLB,, | Performed by: FAMILY MEDICINE

## 2023-08-25 PROCEDURE — 96372 THER/PROPH/DIAG INJ SC/IM: CPT | Mod: S$GLB,,, | Performed by: FAMILY MEDICINE

## 2023-08-25 PROCEDURE — 99999 PR PBB SHADOW E&M-EST. PATIENT-LVL IV: CPT | Mod: PBBFAC,,, | Performed by: FAMILY MEDICINE

## 2023-08-25 PROCEDURE — 3060F POS MICROALBUMINURIA REV: CPT | Mod: CPTII,S$GLB,, | Performed by: FAMILY MEDICINE

## 2023-08-25 PROCEDURE — 1159F MED LIST DOCD IN RCRD: CPT | Mod: CPTII,S$GLB,, | Performed by: FAMILY MEDICINE

## 2023-08-25 PROCEDURE — 3078F PR MOST RECENT DIASTOLIC BLOOD PRESSURE < 80 MM HG: ICD-10-PCS | Mod: CPTII,S$GLB,, | Performed by: FAMILY MEDICINE

## 2023-08-25 RX ORDER — TESTOSTERONE CYPIONATE 200 MG/ML
400 INJECTION, SOLUTION INTRAMUSCULAR
Status: DISCONTINUED | OUTPATIENT
Start: 2023-08-25 | End: 2024-01-03

## 2023-08-25 RX ORDER — SEMAGLUTIDE 0.68 MG/ML
0.5 INJECTION, SOLUTION SUBCUTANEOUS
Qty: 3 ML | Refills: 1 | Status: SHIPPED | OUTPATIENT
Start: 2023-08-25 | End: 2023-11-22

## 2023-08-25 RX ADMIN — TESTOSTERONE CYPIONATE 400 MG: 200 INJECTION, SOLUTION INTRAMUSCULAR at 04:08

## 2023-08-25 NOTE — PROGRESS NOTES
Patient instructed to wait 15 minutes following injection for possible reaction. Pt verbalized understanding and tolerated well. Next visit scheduled and eNAR sent.    Lot # [id number]   2025

## 2023-08-27 NOTE — PROGRESS NOTES
Subjective:      Patient ID: Kendrick Nava is a 47 y.o. male.    Chief Complaint: Follow-up      Patient here for routine follow up on diabetes, HTN, hyperlipidemia, hypotestosteronemia. Reviewed labs drawn recently today with the patient.   A1c is not much improved -7.7, was  7.8 last check - discouraged about this as he has made some dietary and lifestyle changes, taking all medications prescribed  Lipids at goal.  Blood pressure controlled today.  Kidney function is stable.  Testosterone in range - due for injection today    Follow-up      Review of Systems   Constitutional:  Negative for activity change and appetite change.   Respiratory:  Negative for shortness of breath.    Cardiovascular:  Negative for leg swelling.     Past Medical History:   Diagnosis Date    Diabetes mellitus, type 2     Hyperlipidemia     Testosterone deficiency     LOW Testosterone          Past Surgical History:   Procedure Laterality Date    EPIDURAL STEROID INJECTION N/A 2/2/2023    Procedure: Lumbar L5/S1 IL JONATAN with right paramedian approach RN IV Sedation;  Surgeon: Nico Angeles MD;  Location: [facility];  Service: Pain Management;  Laterality: N/A;    SELECTIVE INJECTION OF ANESTHETIC AGENT AROUND LUMBAR SPINAL NERVE ROOT BY TRANSFORAMINAL APPROACH Bilateral 4/4/2023    Procedure: Bilateral L5/S1 TF JONATAN;  Surgeon: Nico Angeles MD;  Location: [facility];  Service: Pain Management;  Laterality: Bilateral;     Family History   Problem Relation Age of Onset    Diabetes type II Mother     Diabetes type II Father     Hypertension Father     Diabetes type II Brother     Cancer Maternal Grandmother     Diabetes Maternal Grandfather     No Known Problems Paternal Grandmother     Heart attack Paternal Grandfather      Social History     Socioeconomic History    Marital status:    Tobacco Use    Smoking status: Some Days     Types: Vaping with nicotine    Smokeless tobacco: Never   Substance and Sexual Activity     "Alcohol use: Yes     Alcohol/week: 1.0 standard drink of alcohol     Types: 1 Shots of liquor per week     Comment: once a month    Drug use: Never    Sexual activity: Yes     Partners: Male     Review of patient's allergies indicates:   Allergen Reactions    Lipitor [atorvastatin]      depression       Objective:       /68 (BP Location: Left arm, Patient Position: Sitting, BP Method: Large (Manual))   Pulse 106   Temp 97 °F (36.1 °C) (Tympanic)   Ht 5' 11" (1.803 m)   Wt 116.1 kg (255 lb 15.3 oz)   SpO2 97%   BMI 35.70 kg/m²   Physical Exam  Constitutional:       General: He is not in acute distress.     Appearance: Normal appearance. He is well-developed. He is not ill-appearing or diaphoretic.   Cardiovascular:      Rate and Rhythm: Normal rate and regular rhythm.      Heart sounds: Normal heart sounds.   Pulmonary:      Effort: Pulmonary effort is normal.      Breath sounds: Normal breath sounds.   Musculoskeletal:      Right lower leg: No edema.      Left lower leg: No edema.   Neurological:      General: No focal deficit present.      Mental Status: He is alert and oriented to person, place, and time.   Psychiatric:         Mood and Affect: Mood normal.         Behavior: Behavior normal.         Thought Content: Thought content normal.         Judgment: Judgment normal.       Assessment:     1. Type 2 diabetes mellitus without complication, without long-term current use of insulin    2. Testosterone deficiency    3. Hyperlipidemia, unspecified hyperlipidemia type      Plan:   Type 2 diabetes mellitus without complication, without long-term current use of insulin  -     semaglutide (OZEMPIC) 0.25 mg or 0.5 mg (2 mg/3 mL) pen injector; Inject 0.5 mg into the skin every 7 days.  Dispense: 3 mL; Refill: 1  -     Hemoglobin A1C; Future; Expected date: 11/23/2023  -     Comprehensive Metabolic Panel; Future; Expected date: 11/23/2023    Testosterone deficiency    Hyperlipidemia, unspecified " hyperlipidemia type    Other orders  -     testosterone cypionate injection 400 mg    Continue all other current medications.   Will try to initiate ozempic if we can get covered - he has been hesitant about injection but will try it.   Continue all other current medications.     Medication List with Changes/Refills   New Medications    SEMAGLUTIDE (OZEMPIC) 0.25 MG OR 0.5 MG (2 MG/3 ML) PEN INJECTOR    Inject 0.5 mg into the skin every 7 days.   Current Medications    CELECOXIB (CELEBREX) 100 MG CAPSULE    Take 1 capsule (100 mg total) by mouth 2 (two) times daily as needed for Pain.    CYCLOBENZAPRINE (FLEXERIL) 10 MG TABLET    Take 1 tablet (10 mg total) by mouth every evening.    EMPAGLIFLOZIN (JARDIANCE) 25 MG TABLET    Take 1 tablet (25 mg total) by mouth once daily.    GABAPENTIN (NEURONTIN) 300 MG CAPSULE    Take 2 capsules (600 mg total) by mouth 3 (three) times daily. Increase from 600 mg nightly to 600 mg three times daily by adding 1 additional tablet every 3 days as tolerated    HYDROCODONE-ACETAMINOPHEN (NORCO) 5-325 MG PER TABLET    Take 1 tablet by mouth every 6 (six) hours as needed for Pain.    METFORMIN (GLUCOPHAGE-XR) 750 MG ER 24HR TABLET    Take two tablets with breakfast and one in the evenings.    ROSUVASTATIN (CRESTOR) 20 MG TABLET    Take 1 tablet (20 mg total) by mouth once daily.

## 2023-08-30 DIAGNOSIS — M47.816 LUMBAR SPONDYLOSIS: ICD-10-CM

## 2023-08-30 DIAGNOSIS — G89.29 CHRONIC BILATERAL LOW BACK PAIN WITHOUT SCIATICA: ICD-10-CM

## 2023-08-30 DIAGNOSIS — M54.16 LUMBAR RADICULOPATHY: ICD-10-CM

## 2023-08-30 DIAGNOSIS — M54.50 CHRONIC BILATERAL LOW BACK PAIN WITHOUT SCIATICA: ICD-10-CM

## 2023-08-31 RX ORDER — HYDROCODONE BITARTRATE AND ACETAMINOPHEN 5; 325 MG/1; MG/1
1 TABLET ORAL EVERY 6 HOURS PRN
Qty: 21 TABLET | Refills: 0 | Status: SHIPPED | OUTPATIENT
Start: 2023-08-31 | End: 2023-10-18 | Stop reason: SDUPTHER

## 2023-08-31 RX ORDER — METFORMIN HYDROCHLORIDE 750 MG/1
TABLET, EXTENDED RELEASE ORAL
Qty: 90 TABLET | Refills: 11 | Status: SHIPPED | OUTPATIENT
Start: 2023-08-31 | End: 2024-02-03 | Stop reason: SDUPTHER

## 2023-08-31 NOTE — TELEPHONE ENCOUNTER
No care due was identified.  [facility] Embedded Care Due Messages. Reference number: [id number].   8/30/2023 9:05:20 PM CDT

## 2023-08-31 NOTE — TELEPHONE ENCOUNTER
Good Morning/Afternoon,     Pt is requesting for a refill of: Cyclobenzaprine 10 mg  Last filed: 7/18/23  Last encounter: 7/18/23  Up coming apt: 10/18/23  Pharmacy: [facility] #[id number]  Is this something you can do?      Mya Espana Assistant      Good Morning/Afternoon,     Pt is requesting for a refill of: Gabapentin 300 mg   Last filed: 7/18/23  Last encounter: 7/18/23  Up coming apt: 10/18/23  Pharmacy: [facility] # [id number]  Is this something you can do?      Mya Johnson

## 2023-09-05 RX ORDER — CYCLOBENZAPRINE HCL 10 MG
10 TABLET ORAL NIGHTLY
Qty: 30 TABLET | Refills: 1 | Status: SHIPPED | OUTPATIENT
Start: 2023-09-05 | End: 2023-10-18 | Stop reason: SDUPTHER

## 2023-09-05 RX ORDER — GABAPENTIN 300 MG/1
600 CAPSULE ORAL 3 TIMES DAILY
Qty: 90 CAPSULE | Refills: 2 | Status: SHIPPED | OUTPATIENT
Start: 2023-09-05 | End: 2023-09-10 | Stop reason: SDUPTHER

## 2023-09-08 ENCOUNTER — CLINICAL SUPPORT (OUTPATIENT)
Dept: INTERNAL MEDICINE | Facility: CLINIC | Age: 48
End: 2023-09-08
Payer: COMMERCIAL

## 2023-09-08 DIAGNOSIS — E34.9 TESTOSTERONE DEFICIENCY: Primary | ICD-10-CM

## 2023-09-08 PROCEDURE — 96372 PR INJECTION,THERAP/PROPH/DIAG2ST, IM OR SUBCUT: ICD-10-PCS | Mod: S$GLB,,, | Performed by: FAMILY MEDICINE

## 2023-09-08 PROCEDURE — 99999 PR PBB SHADOW E&M-EST. PATIENT-LVL II: ICD-10-PCS | Mod: PBBFAC,,,

## 2023-09-08 PROCEDURE — 96372 THER/PROPH/DIAG INJ SC/IM: CPT | Mod: S$GLB,,, | Performed by: FAMILY MEDICINE

## 2023-09-08 PROCEDURE — 99999 PR PBB SHADOW E&M-EST. PATIENT-LVL II: CPT | Mod: PBBFAC,,,

## 2023-09-08 RX ADMIN — TESTOSTERONE CYPIONATE 400 MG: 200 INJECTION, SOLUTION INTRAMUSCULAR at 02:09

## 2023-09-08 NOTE — PROGRESS NOTES
Patient came into clinic for DepoTestosterone 400mg injection as ordered by . Pt instructed to wait 15 minutes following injection for possible reaction. Pt verbalized understanding and tolerated well.     Lot #[id number]    2025

## 2023-09-10 DIAGNOSIS — M54.50 CHRONIC BILATERAL LOW BACK PAIN WITHOUT SCIATICA: ICD-10-CM

## 2023-09-10 DIAGNOSIS — M54.16 LUMBAR RADICULOPATHY: ICD-10-CM

## 2023-09-10 DIAGNOSIS — G89.29 CHRONIC BILATERAL LOW BACK PAIN WITHOUT SCIATICA: ICD-10-CM

## 2023-09-11 RX ORDER — GABAPENTIN 300 MG/1
600 CAPSULE ORAL 3 TIMES DAILY
Qty: 90 CAPSULE | Refills: 2 | Status: SHIPPED | OUTPATIENT
Start: 2023-09-11 | End: 2023-10-18 | Stop reason: SDUPTHER

## 2023-09-19 ENCOUNTER — PATIENT MESSAGE (OUTPATIENT)
Dept: INTERNAL MEDICINE | Facility: CLINIC | Age: 48
End: 2023-09-19
Payer: COMMERCIAL

## 2023-09-22 ENCOUNTER — TELEPHONE (OUTPATIENT)
Dept: INTERNAL MEDICINE | Facility: CLINIC | Age: 48
End: 2023-09-22

## 2023-09-22 ENCOUNTER — CLINICAL SUPPORT (OUTPATIENT)
Dept: INTERNAL MEDICINE | Facility: CLINIC | Age: 48
End: 2023-09-22
Payer: COMMERCIAL

## 2023-09-22 DIAGNOSIS — E34.9 TESTOSTERONE DEFICIENCY: Primary | ICD-10-CM

## 2023-09-22 PROCEDURE — 99999 PR PBB SHADOW E&M-EST. PATIENT-LVL II: CPT | Mod: PBBFAC,,,

## 2023-09-22 PROCEDURE — 96372 THER/PROPH/DIAG INJ SC/IM: CPT | Mod: S$GLB,,, | Performed by: FAMILY MEDICINE

## 2023-09-22 PROCEDURE — 96372 PR INJECTION,THERAP/PROPH/DIAG2ST, IM OR SUBCUT: ICD-10-PCS | Mod: S$GLB,,, | Performed by: FAMILY MEDICINE

## 2023-09-22 PROCEDURE — 99999 PR PBB SHADOW E&M-EST. PATIENT-LVL II: ICD-10-PCS | Mod: PBBFAC,,,

## 2023-09-22 RX ADMIN — TESTOSTERONE CYPIONATE 400 MG: 200 INJECTION, SOLUTION INTRAMUSCULAR at 12:09

## 2023-09-22 NOTE — PROGRESS NOTES
Depotestosterone 400 mg ( 2 ml ) given IM in left ventral gluteus  Lot #: [id number]     Exp: 02/28/25    NDC #: 81503-853-14  Manufactory: [employer]    Pt waited 15 minutes without a reaction notices

## 2023-10-06 ENCOUNTER — CLINICAL SUPPORT (OUTPATIENT)
Dept: INTERNAL MEDICINE | Facility: CLINIC | Age: 48
End: 2023-10-06
Payer: COMMERCIAL

## 2023-10-06 DIAGNOSIS — E34.9 TESTOSTERONE DEFICIENCY: Primary | ICD-10-CM

## 2023-10-06 PROCEDURE — 96372 THER/PROPH/DIAG INJ SC/IM: CPT | Mod: S$GLB,,, | Performed by: FAMILY MEDICINE

## 2023-10-06 PROCEDURE — 96372 PR INJECTION,THERAP/PROPH/DIAG2ST, IM OR SUBCUT: ICD-10-PCS | Mod: S$GLB,,, | Performed by: FAMILY MEDICINE

## 2023-10-06 RX ADMIN — TESTOSTERONE CYPIONATE 400 MG: 200 INJECTION, SOLUTION INTRAMUSCULAR at 11:10

## 2023-10-06 NOTE — PROGRESS NOTES
Depotestosterone 400 mg ( 2 ml ) given IM in right ventral gluteus  Lot #: [id number]   Exp: 02/28/25  Manufactory : [employer]  NDC #: 75025-217-09    Pt waited 15 minutes without a reaction notices

## 2023-10-18 ENCOUNTER — OFFICE VISIT (OUTPATIENT)
Dept: PAIN MEDICINE | Facility: CLINIC | Age: 48
End: 2023-10-18
Payer: COMMERCIAL

## 2023-10-18 VITALS
WEIGHT: 252.31 LBS | DIASTOLIC BLOOD PRESSURE: 82 MMHG | HEIGHT: 71 IN | HEART RATE: 86 BPM | BODY MASS INDEX: 35.32 KG/M2 | SYSTOLIC BLOOD PRESSURE: 139 MMHG

## 2023-10-18 DIAGNOSIS — G89.29 CHRONIC BILATERAL LOW BACK PAIN WITHOUT SCIATICA: ICD-10-CM

## 2023-10-18 DIAGNOSIS — M54.16 LUMBAR RADICULOPATHY: ICD-10-CM

## 2023-10-18 DIAGNOSIS — M54.50 CHRONIC BILATERAL LOW BACK PAIN WITHOUT SCIATICA: ICD-10-CM

## 2023-10-18 DIAGNOSIS — M47.816 LUMBAR SPONDYLOSIS: ICD-10-CM

## 2023-10-18 PROCEDURE — 3075F PR MOST RECENT SYSTOLIC BLOOD PRESS GE 130-139MM HG: ICD-10-PCS | Mod: CPTII,S$GLB,, | Performed by: PHYSICAL MEDICINE & REHABILITATION

## 2023-10-18 PROCEDURE — 3079F DIAST BP 80-89 MM HG: CPT | Mod: CPTII,S$GLB,, | Performed by: PHYSICAL MEDICINE & REHABILITATION

## 2023-10-18 PROCEDURE — 3008F BODY MASS INDEX DOCD: CPT | Mod: CPTII,S$GLB,, | Performed by: PHYSICAL MEDICINE & REHABILITATION

## 2023-10-18 PROCEDURE — 99213 OFFICE O/P EST LOW 20 MIN: CPT | Mod: S$GLB,,, | Performed by: PHYSICAL MEDICINE & REHABILITATION

## 2023-10-18 PROCEDURE — 3066F NEPHROPATHY DOC TX: CPT | Mod: CPTII,S$GLB,, | Performed by: PHYSICAL MEDICINE & REHABILITATION

## 2023-10-18 PROCEDURE — 3060F PR POS MICROALBUMINURIA RESULT DOCUMENTED/REVIEW: ICD-10-PCS | Mod: CPTII,S$GLB,, | Performed by: PHYSICAL MEDICINE & REHABILITATION

## 2023-10-18 PROCEDURE — 3051F PR MOST RECENT HEMOGLOBIN A1C LEVEL 7.0 - < 8.0%: ICD-10-PCS | Mod: CPTII,S$GLB,, | Performed by: PHYSICAL MEDICINE & REHABILITATION

## 2023-10-18 PROCEDURE — 99999 PR PBB SHADOW E&M-EST. PATIENT-LVL III: CPT | Mod: PBBFAC,,, | Performed by: PHYSICAL MEDICINE & REHABILITATION

## 2023-10-18 PROCEDURE — 3060F POS MICROALBUMINURIA REV: CPT | Mod: CPTII,S$GLB,, | Performed by: PHYSICAL MEDICINE & REHABILITATION

## 2023-10-18 PROCEDURE — 99213 PR OFFICE/OUTPT VISIT, EST, LEVL III, 20-29 MIN: ICD-10-PCS | Mod: S$GLB,,, | Performed by: PHYSICAL MEDICINE & REHABILITATION

## 2023-10-18 PROCEDURE — 99999 PR PBB SHADOW E&M-EST. PATIENT-LVL III: ICD-10-PCS | Mod: PBBFAC,,, | Performed by: PHYSICAL MEDICINE & REHABILITATION

## 2023-10-18 PROCEDURE — 3075F SYST BP GE 130 - 139MM HG: CPT | Mod: CPTII,S$GLB,, | Performed by: PHYSICAL MEDICINE & REHABILITATION

## 2023-10-18 PROCEDURE — 3008F PR BODY MASS INDEX (BMI) DOCUMENTED: ICD-10-PCS | Mod: CPTII,S$GLB,, | Performed by: PHYSICAL MEDICINE & REHABILITATION

## 2023-10-18 PROCEDURE — 3066F PR DOCUMENTATION OF TREATMENT FOR NEPHROPATHY: ICD-10-PCS | Mod: CPTII,S$GLB,, | Performed by: PHYSICAL MEDICINE & REHABILITATION

## 2023-10-18 PROCEDURE — 3079F PR MOST RECENT DIASTOLIC BLOOD PRESSURE 80-89 MM HG: ICD-10-PCS | Mod: CPTII,S$GLB,, | Performed by: PHYSICAL MEDICINE & REHABILITATION

## 2023-10-18 PROCEDURE — 1159F PR MEDICATION LIST DOCUMENTED IN MEDICAL RECORD: ICD-10-PCS | Mod: CPTII,S$GLB,, | Performed by: PHYSICAL MEDICINE & REHABILITATION

## 2023-10-18 PROCEDURE — 1159F MED LIST DOCD IN RCRD: CPT | Mod: CPTII,S$GLB,, | Performed by: PHYSICAL MEDICINE & REHABILITATION

## 2023-10-18 PROCEDURE — 3051F HG A1C>EQUAL 7.0%<8.0%: CPT | Mod: CPTII,S$GLB,, | Performed by: PHYSICAL MEDICINE & REHABILITATION

## 2023-10-18 RX ORDER — CYCLOBENZAPRINE HCL 10 MG
10 TABLET ORAL NIGHTLY
Qty: 30 TABLET | Refills: 1 | Status: SHIPPED | OUTPATIENT
Start: 2023-10-18 | End: 2023-11-28 | Stop reason: SDUPTHER

## 2023-10-18 RX ORDER — GABAPENTIN 300 MG/1
900 CAPSULE ORAL 2 TIMES DAILY
Qty: 180 CAPSULE | Refills: 2 | Status: SHIPPED | OUTPATIENT
Start: 2023-10-18 | End: 2023-11-28 | Stop reason: SDUPTHER

## 2023-10-18 RX ORDER — HYDROCODONE BITARTRATE AND ACETAMINOPHEN 5; 325 MG/1; MG/1
1 TABLET ORAL EVERY 6 HOURS PRN
Qty: 21 TABLET | Refills: 0 | Status: SHIPPED | OUTPATIENT
Start: 2023-10-18 | End: 2023-11-10 | Stop reason: SDUPTHER

## 2023-10-18 RX ORDER — CELECOXIB 100 MG/1
100 CAPSULE ORAL 2 TIMES DAILY PRN
Qty: 60 CAPSULE | Refills: 2 | Status: SHIPPED | OUTPATIENT
Start: 2023-10-18 | End: 2023-11-28 | Stop reason: SDUPTHER

## 2023-10-18 NOTE — PROGRESS NOTES
Est Patient Chronic Pain Note (Follow up Visit)      Chief Complaint:   Chief Complaint   Patient presents with    Low-back Pain        SUBJECTIVE:    Kendrick Nava is a 47 y.o. male presents today for follow-up chronic lower back pain.  Current pain intensity is 4/10.  Reports that his pain is stable with use of gabapentin, Celebrex, Flexeril and Norco p.r.n..  He denies any adverse reactions to these medications he reports his pain is well managed with these medications.  He is able to complete his daily task and labor intensive job without any significant issues.        Patient denies night fever/night sweats, urinary incontinence, bowel incontinence, significant weight loss, significant motor weakness, and loss of sensations.    Pain Disability Index Review:      10/18/2023     9:09 AM 7/18/2023     7:25 AM 1/13/2023     9:11 AM   Last 3 PDI Scores   Pain Disability Index (PDI) 49 34 50       Interval History (7/18/2023):  Kendrick Nava presents today for follow-up visit.  Patient was last seen on 5/2/2023. He reports his leg pain is well-controlled with Gabapentin, he takes 900 mg in the morning and 900 mg at night. Continues to take Celebrex daily and Flexeril nightly with good relief, Norco sparingly for severe. Pain. Reports right sided pain in his lower back that wraps around into his stomach x 3 weeks. Patient reports pain as 4/10 today.    Interval History (5/1/2023): Kendrick Nava presents today for follow-up visit.  he underwent bilateral L5/S1 TF JONATAN on 4/4/23.  The patient reports that he is/was better following the procedure.  he reports initially experienced worsening pain for about a week after the procedure, followed by  95% pain relief x 1 week before relief dwindled to 20%. Reports continued pressure in his lower back and pain across the lumbosacral region in a band like distribution with radiation into his right lower extremity along L5/S1 distribution. Takes Gabapentin and Flexeril  nightly with good relief, no side effects. Norco for severe pain.  Patient reports pain as 6/10 today.    Interval HPI 03/13/2023  Kendrick Nava is a 47 y.o. male presents today for injection follow-up.  He was last seen for L5-S1 IL JONATAN on 02/02/2023.  He reports that this resulted in 80+ % relief for the initial 2 weeks, but now has decreased to about 50-60% relief.  He feels that the pain is of the same type and quality and is in the same location..    Interval History (1/13/2023):  Kendrick Nava presents today for follow-up visit.  Patient was last seen on Visit date not found. Patient reports pain as 5/10 today.  He has been performing physician directed exercises targeting sciatic nerve pain for several weeks without improvement. He reports continued pain in his thoracolumbar region but his worst pain is persistently along his lumbosacral region in a band-like distribution with radiation into his right lower extremity along L5/S1 distribution. Reports occasional radiation into his left leg. He installs italo for a living and reports prolonged bending and stooping worsens his symptoms. He reports by the end of the day he walks with a limp.      Initial HPI 08/29/2022  Kendrick Nava is a 47 y.o. male who presents to the clinic for the evaluation of back pain.  He was referred by his primary care provider for further evaluation and management of this pain.  The pain started a few years ago following a fall from a ladder in 2018 and symptoms have been unchanged.The pain is located in the right thoracolumbar area and radiates to the lumbosacral region with occasional radiation into the left lower extremity extending to the foot and ankle.  The pain is described as aching, numbness, tingling burning and is rated as 6/10. The pain is rated with a score of  4/10 on the BEST day and a score of 8/10 on the WORST day.  Symptoms interfere with daily activity. The pain is exacerbated by work activities,  prolonged standing or sitting.  The pain is mitigated by activity modification.       Non-Pharmacologic Treatments:  Physical Therapy/Home Exercise: yes  Ice/Heat:yes  TENS: no  Acupuncture: no  Massage: yes  Chiropractic: yes    Other: no      Pain Medications:  - Opioids:  Tramadol  - Adjuvant Medications:  Mobic  - Anti-Coagulants:  None     report:  Reviewed and consistent with medication use as prescribed.      Pain Procedures:   -02/02/2023:  L5-S1 IL JONATAN, 80% relief for the initial 2 weeks with continued 50-60% relief  - bilateral L5/S1 TF JONATAN on 4/4/23 with 95% relief x 1 week, then 20% relief    Imaging:   Outside CT and MRI of the thoracic lumbar spine reviewed 2018    CT lumbar spine 12/02/2022     FINDINGS:  No acute fracture or dislocation.  Moderate disc height loss T11-12 and T12-L1.  Mild disc height loss at L1-2 through L4-5.  Mild facet arthropathy.  No significant endplate sclerosis.  Mild Schmorl's node formation T12-L1 and L1-2.     SI joints unremarkable.  Mild aortoiliac atherosclerotic calcification.     Impression:     No acute abnormality identified.  Chronic mild discogenic degenerative change as described above    CT lumbar spine 07/22/2018  1.  Acute, traumatic fractures involving the left L2, left L3, and left L4 transverse processes.     2.  Multilevel lumbar degenerative disc disease, spondylosis, and stenoses, as discussed above. This is probably most prominent at L5-S1 on the right, with there is mass effect on and posterior displacement of the right S1 nerve root within the lateral recess, with right lateral recess stenosis.    X-ray lumbar spine 12/02/2021:  The vertebral bodies of the lumbar spine demonstrate a normal height and alignment.  There appears to be mild chronic vertebral body height loss at the T11 and T12 levels with some spondylosis noted at these levels.  The disc space heights appear to be relatively well maintained.  Mild spondylosis noted anteriorly at the  L3-4 and to a lesser degree the L4-5 levels.  No pars defects are noted.       Past Medical History:   Diagnosis Date    Diabetes mellitus, type 2     Hyperlipidemia     Testosterone deficiency     LOW Testosterone     Past Surgical History:   Procedure Laterality Date    EPIDURAL STEROID INJECTION N/A 2/2/2023    Procedure: Lumbar L5/S1 IL JONATAN with right paramedian approach RN IV Sedation;  Surgeon: Nico Angeles MD;  Location: [facility];  Service: Pain Management;  Laterality: N/A;    SELECTIVE INJECTION OF ANESTHETIC AGENT AROUND LUMBAR SPINAL NERVE ROOT BY TRANSFORAMINAL APPROACH Bilateral 4/4/2023    Procedure: Bilateral L5/S1 TF JONATAN;  Surgeon: Nico Angeles MD;  Location: [facility];  Service: Pain Management;  Laterality: Bilateral;     Social History     Socioeconomic History    Marital status:    Tobacco Use    Smoking status: Some Days     Types: Vaping with nicotine    Smokeless tobacco: Never   Substance and Sexual Activity    Alcohol use: Yes     Alcohol/week: 1.0 standard drink of alcohol     Types: 1 Shots of liquor per week     Comment: once a month    Drug use: Never    Sexual activity: Yes     Partners: Male     Family History   Problem Relation Age of Onset    Diabetes type II Mother     Diabetes type II Father     Hypertension Father     Diabetes type II Brother     Cancer Maternal Grandmother     Diabetes Maternal Grandfather     No Known Problems Paternal Grandmother     Heart attack Paternal Grandfather        Review of patient's allergies indicates:  No Known Allergies      Current Outpatient Medications   Medication Sig    empagliflozin (JARDIANCE) 25 mg tablet Take 1 tablet (25 mg total) by mouth once daily.    metFORMIN (GLUCOPHAGE-XR) 750 MG ER 24hr tablet Take two tablets with breakfast and one in the evenings.    rosuvastatin (CRESTOR) 20 MG tablet Take 1 tablet (20 mg total) by mouth once daily.    celecoxib (CELEBREX) 100 MG capsule Take 1 capsule (100 mg total)  "by mouth 2 (two) times daily as needed for Pain.    cyclobenzaprine (FLEXERIL) 10 MG tablet Take 1 tablet (10 mg total) by mouth every evening.    gabapentin (NEURONTIN) 300 MG capsule Take 3 capsules (900 mg total) by mouth 2 (two) times daily.    HYDROcodone-acetaminophen (NORCO) 5-325 mg per tablet Take 1 tablet by mouth every 6 (six) hours as needed for Pain.    semaglutide (OZEMPIC) 0.25 mg or 0.5 mg (2 mg/3 mL) pen injector Inject 0.5 mg into the skin every 7 days. (Patient not taking: Reported on 10/18/2023)     Current Facility-Administered Medications   Medication    testosterone cypionate injection 400 mg    testosterone cypionate injection 400 mg       Review of Systems     GENERAL:  No weight loss, malaise or fevers.  HEENT:   No recent changes in vision or hearing  NECK:  Negative for lumps, no difficulty with swallowing.  RESPIRATORY:  Negative for cough, wheezing or shortness of breath, patient denies any recent URI.  CARDIOVASCULAR:  Negative for chest pain, leg swelling or palpitations.  GI:  Negative for abdominal discomfort, blood in stools or black stools or change in bowel habits.  MUSCULOSKELETAL:  See HPI.  SKIN:  Negative for lesions, rash, and itching.  PSYCH:  No mood disorder or recent psychosocial stressors.  Patients sleep is not disturbed secondary to pain.  HEMATOLOGY/LYMPHOLOGY:  Negative for prolonged bleeding, bruising easily or swollen nodes.  Patient is not currently taking any anti-coagulants  NEURO:   No history of headaches, syncope, paralysis, seizures or tremors.  All other reviewed and negative other than HPI.    OBJECTIVE:    /82   Pulse 86   Ht 5' 11" (1.803 m)   Wt 114.4 kg (252 lb 5.1 oz)   BMI 35.19 kg/m²         Physical Exam    GENERAL: Well appearing, in no acute distress, alert and oriented x3.  PSYCH:  Mood and affect appropriate.  SKIN: Skin color, texture, turgor normal, no rashes or lesions.  HEAD/FACE:  Normocephalic, atraumatic. Cranial nerves " grossly intact.  CV: RRR with palpation of the radial artery.  PULM: No evidence of respiratory difficulty, symmetric chest rise.  GI:  Soft and non-tender, negative heredia's sign, negative Mcburney's sign, negative CVA tenderness  BACK: Straight leg raising in the sitting and supine positions is positive to radicular pain on the right, equivocal to radicular pain on the left.  Moderate pain to palpation over the facet joints of the lumbar spine and lumbar paraspinals, mostly on the left   Limited range of motion secondary to pain reproduction.  EXTREMITIES: Peripheral joint ROM is full and pain free without obvious instability or laxity in all four extremities. No deformities, edema, or skin discoloration. Good capillary refill.  MUSCULOSKELETAL:  Hip and knee provocative maneuvers are negative.  There is no pain with palpation over the sacroiliac joints bilaterally. Moderated tenderness along right quadratus lumborum. FABERs test is negative.  FADIRs test is negative.   Bilateral upper and lower extremity strength is normal and symmetric.  No atrophy or tone abnormalities are noted.  NEURO: Bilateral upper and lower extremity coordination and muscle stretch reflexes are physiologic and symmetric.  Plantar response are downgoing. No clonus.  No loss of sensation is noted.  GAIT: normal.      LABS:  Lab Results   Component Value Date    WBC 7.86 02/16/2023    HGB 14.9 02/16/2023    HCT 45.5 02/16/2023    MCV 86 02/16/2023     02/16/2023       CMP  Sodium   Date Value Ref Range Status   08/09/2023 140 136 - 145 mmol/L Final     Potassium   Date Value Ref Range Status   08/09/2023 4.8 3.5 - 5.1 mmol/L Final     Chloride   Date Value Ref Range Status   08/09/2023 102 95 - 110 mmol/L Final     CO2   Date Value Ref Range Status   08/09/2023 28 23 - 29 mmol/L Final     Glucose   Date Value Ref Range Status   08/09/2023 171 (H) 70 - 110 mg/dL Final     BUN   Date Value Ref Range Status   08/09/2023 13 6 - 20 mg/dL  Final     Creatinine   Date Value Ref Range Status   08/09/2023 1.2 0.5 - 1.4 mg/dL Final     Calcium   Date Value Ref Range Status   08/09/2023 9.3 8.7 - 10.5 mg/dL Final     Total Protein   Date Value Ref Range Status   08/09/2023 7.4 6.0 - 8.4 g/dL Final     Albumin   Date Value Ref Range Status   08/09/2023 4.5 3.5 - 5.2 g/dL Final   11/04/2022 4.8 3.6 - 5.1 g/dL Final     Total Bilirubin   Date Value Ref Range Status   08/09/2023 0.6 0.1 - 1.0 mg/dL Final     Comment:     For infants and newborns, interpretation of results should be based  on gestational age, weight and in agreement with clinical  observations.    Premature Infant recommended reference ranges:  Up to 24 hours.............<8.0 mg/dL  Up to 48 hours............<12.0 mg/dL  3-5 days..................<15.0 mg/dL  6-29 days.................<15.0 mg/dL       Alkaline Phosphatase   Date Value Ref Range Status   08/09/2023 56 55 - 135 U/L Final     AST   Date Value Ref Range Status   08/09/2023 26 10 - 40 U/L Final     ALT   Date Value Ref Range Status   08/09/2023 33 10 - 44 U/L Final     Anion Gap   Date Value Ref Range Status   08/09/2023 10 8 - 16 mmol/L Final     eGFR if    Date Value Ref Range Status   06/06/2022 >60.0 >60 mL/min/1.73 m^2 Final     eGFR if non    Date Value Ref Range Status   06/06/2022 >60.0 >60 mL/min/1.73 m^2 Final     Comment:     Calculation used to obtain the estimated glomerular filtration  rate (eGFR) is the CKD-EPI equation.          Lab Results   Component Value Date    HGBA1C 7.7 (H) 08/09/2023             ASSESSMENT: 47 y.o. year old male with chronic lower back pain, consistent with     1. Lumbar radiculopathy  HYDROcodone-acetaminophen (NORCO) 5-325 mg per tablet    gabapentin (NEURONTIN) 300 MG capsule    celecoxib (CELEBREX) 100 MG capsule    cyclobenzaprine (FLEXERIL) 10 MG tablet      2. Lumbar spondylosis  HYDROcodone-acetaminophen (NORCO) 5-325 mg per tablet      3. Chronic  bilateral low back pain without sciatica  gabapentin (NEURONTIN) 300 MG capsule    celecoxib (CELEBREX) 100 MG capsule    cyclobenzaprine (FLEXERIL) 10 MG tablet                    PLAN:   - Interventions: None at this time, hold off on injection for now, NSG as last resort as patient would like to avoid surgery  -s/p bilateral L5/S1 TF JONATAN on 4/4/23 with 95% relief x 1 week, then 20% relief  s/p L5/S1 IL JONATAN with 80% relief with the initial 2 weeks.      - Anticoagulation use: no       - Medications: I have stressed the importance of physical activity and a home exercise plan to help with pain and improve health. and Patient can continue with medications for now since they are providing benefits, using them appropriately, and without side effects.       Will provide refill of Norco 5/325 mg Q 8 p.r.n., 21 tablets.   reviewed, last filled on 08/31/2023  - Continue Gabapentin 900 mg BID  -Continue Celebrex 100 mg BID for day time pain/inflammation  -Continue Flexeril 10 mg nightly        - Therapy:  Advised patient continue with activities as tolerated    - Psychological:  Discussed coping mechanisms to help address chronic pain issues    - Labs:  Reviewed    - Imaging: Reviewed available imaging with patient and answered any questions they had regarding study.      - Consults/Referrals:  None at this time    - Records:  Reviewed/Obtain old records from outside physicians and imaging    - Follow up visit 3 months or PRN    - Counseled patient regarding the importance of activity modification and physical therapy    - This condition does not require this patient to take time off of work, and the primary goal of our Pain Management services is to improve the patient's functional capacity.    - Patient Questions: Answered all of the patient's questions regarding diagnosis, therapy, and treatment        The above plan and management options were discussed at length with patient. Patient is in agreement with the above  and verbalized understanding.    I discussed the goals of interventional chronic pain management with the patient on today's visit.  I explained the utility of injections for diagnostic and therapeutic purposes.  We discussed a multimodal approach to pain including treating the patient's given worst pain at any given time.  We will use a systematic approach to addressing pain.  We will also adopt a multimodal approach that includes injections, adjuvant medications, physical therapy, at times psychiatry.  There may be a limited role for opioid use intermittently in the treatment of pain, more particularly for acute pain although no one approach can be used as a sole treatment modality.    I emphasized the importance of regular exercise, core strengthening and stretching, diet and weight loss as a cornerstone of long-term pain management.      Nico Angeles MD  Interventional Pain Management  [facility]    Disclaimer:  This note was prepared using voice recognition system and is likely to have sound alike errors that may have been overlooked even after proof reading.  Please call me with any questions

## 2023-10-20 ENCOUNTER — CLINICAL SUPPORT (OUTPATIENT)
Dept: INTERNAL MEDICINE | Facility: CLINIC | Age: 48
End: 2023-10-20
Payer: COMMERCIAL

## 2023-10-20 DIAGNOSIS — E34.9 TESTOSTERONE DEFICIENCY: Primary | ICD-10-CM

## 2023-10-20 PROCEDURE — 96372 PR INJECTION,THERAP/PROPH/DIAG2ST, IM OR SUBCUT: ICD-10-PCS | Mod: S$GLB,,, | Performed by: FAMILY MEDICINE

## 2023-10-20 PROCEDURE — 96372 THER/PROPH/DIAG INJ SC/IM: CPT | Mod: S$GLB,,, | Performed by: FAMILY MEDICINE

## 2023-10-20 RX ADMIN — TESTOSTERONE CYPIONATE 400 MG: 200 INJECTION, SOLUTION INTRAMUSCULAR at 10:10

## 2023-10-20 NOTE — PROGRESS NOTES
Depotestosterone 400 mg ( 2 ml ) given IM in left ventral gluteus   Lot #: [id number]   Exp: 02/28/25   NDC #: 81902-356-79  Manufactory: [employer]    Pt waited 15 minutes without a reaction notices

## 2023-11-05 ENCOUNTER — PATIENT MESSAGE (OUTPATIENT)
Dept: INTERNAL MEDICINE | Facility: CLINIC | Age: 48
End: 2023-11-05
Payer: COMMERCIAL

## 2023-11-06 ENCOUNTER — CLINICAL SUPPORT (OUTPATIENT)
Dept: INTERNAL MEDICINE | Facility: CLINIC | Age: 48
End: 2023-11-06
Payer: COMMERCIAL

## 2023-11-06 DIAGNOSIS — E11.9 TYPE 2 DIABETES MELLITUS WITHOUT COMPLICATION, WITHOUT LONG-TERM CURRENT USE OF INSULIN: Primary | ICD-10-CM

## 2023-11-06 DIAGNOSIS — E34.9 TESTOSTERONE DEFICIENCY: ICD-10-CM

## 2023-11-06 PROCEDURE — 99999 PR PBB SHADOW E&M-EST. PATIENT-LVL II: CPT | Mod: PBBFAC,,,

## 2023-11-06 PROCEDURE — 96372 THER/PROPH/DIAG INJ SC/IM: CPT | Mod: S$GLB,,, | Performed by: FAMILY MEDICINE

## 2023-11-06 PROCEDURE — 96372 PR INJECTION,THERAP/PROPH/DIAG2ST, IM OR SUBCUT: ICD-10-PCS | Mod: S$GLB,,, | Performed by: FAMILY MEDICINE

## 2023-11-06 PROCEDURE — 99999 PR PBB SHADOW E&M-EST. PATIENT-LVL II: ICD-10-PCS | Mod: PBBFAC,,,

## 2023-11-06 RX ADMIN — TESTOSTERONE CYPIONATE 400 MG: 200 INJECTION, SOLUTION INTRAMUSCULAR at 01:11

## 2023-11-06 NOTE — PROGRESS NOTES
Patient came into clinic today for DepoTestosterone 400mg as ordered by . Pt instructed to wait 15 minutes following injection for possible reaction. pt verbalized understanding and tolerated well.

## 2023-11-08 ENCOUNTER — TELEPHONE (OUTPATIENT)
Dept: PAIN MEDICINE | Facility: CLINIC | Age: 48
End: 2023-11-08
Payer: COMMERCIAL

## 2023-11-08 NOTE — TELEPHONE ENCOUNTER
Called pt to offer appointment per Dr. Angeles for 11/10 in [place] or 11/13 at [facility] per Dr. Angeles. Pt states he can not drive to [place]. Appointment scheduled for 11/13. Pt verbalized understanding.

## 2023-11-10 ENCOUNTER — PATIENT MESSAGE (OUTPATIENT)
Dept: PAIN MEDICINE | Facility: CLINIC | Age: 48
End: 2023-11-10
Payer: COMMERCIAL

## 2023-11-10 DIAGNOSIS — M54.50 CHRONIC BILATERAL LOW BACK PAIN WITHOUT SCIATICA: ICD-10-CM

## 2023-11-10 DIAGNOSIS — M47.816 LUMBAR SPONDYLOSIS: ICD-10-CM

## 2023-11-10 DIAGNOSIS — M54.16 LUMBAR RADICULOPATHY: ICD-10-CM

## 2023-11-10 DIAGNOSIS — G89.29 CHRONIC BILATERAL LOW BACK PAIN WITHOUT SCIATICA: ICD-10-CM

## 2023-11-10 RX ORDER — HYDROCODONE BITARTRATE AND ACETAMINOPHEN 5; 325 MG/1; MG/1
1 TABLET ORAL EVERY 6 HOURS PRN
Qty: 21 TABLET | Refills: 0 | Status: SHIPPED | OUTPATIENT
Start: 2023-11-10 | End: 2023-11-27 | Stop reason: SDUPTHER

## 2023-11-13 ENCOUNTER — OFFICE VISIT (OUTPATIENT)
Dept: PAIN MEDICINE | Facility: CLINIC | Age: 48
End: 2023-11-13
Payer: COMMERCIAL

## 2023-11-13 VITALS
SYSTOLIC BLOOD PRESSURE: 118 MMHG | DIASTOLIC BLOOD PRESSURE: 74 MMHG | HEART RATE: 102 BPM | HEIGHT: 71 IN | WEIGHT: 249.31 LBS | BODY MASS INDEX: 34.9 KG/M2

## 2023-11-13 DIAGNOSIS — M54.16 LUMBAR RADICULOPATHY: ICD-10-CM

## 2023-11-13 DIAGNOSIS — M51.36 DDD (DEGENERATIVE DISC DISEASE), LUMBAR: ICD-10-CM

## 2023-11-13 DIAGNOSIS — M54.9 DORSALGIA, UNSPECIFIED: Primary | ICD-10-CM

## 2023-11-13 DIAGNOSIS — M21.372 FOOT DROP, LEFT: ICD-10-CM

## 2023-11-13 DIAGNOSIS — M47.816 LUMBAR SPONDYLOSIS: ICD-10-CM

## 2023-11-13 PROCEDURE — 1159F PR MEDICATION LIST DOCUMENTED IN MEDICAL RECORD: ICD-10-PCS | Mod: CPTII,S$GLB,, | Performed by: PHYSICAL MEDICINE & REHABILITATION

## 2023-11-13 PROCEDURE — 3051F PR MOST RECENT HEMOGLOBIN A1C LEVEL 7.0 - < 8.0%: ICD-10-PCS | Mod: CPTII,S$GLB,, | Performed by: PHYSICAL MEDICINE & REHABILITATION

## 2023-11-13 PROCEDURE — 3074F PR MOST RECENT SYSTOLIC BLOOD PRESSURE < 130 MM HG: ICD-10-PCS | Mod: CPTII,S$GLB,, | Performed by: PHYSICAL MEDICINE & REHABILITATION

## 2023-11-13 PROCEDURE — 3066F PR DOCUMENTATION OF TREATMENT FOR NEPHROPATHY: ICD-10-PCS | Mod: CPTII,S$GLB,, | Performed by: PHYSICAL MEDICINE & REHABILITATION

## 2023-11-13 PROCEDURE — 3008F BODY MASS INDEX DOCD: CPT | Mod: CPTII,S$GLB,, | Performed by: PHYSICAL MEDICINE & REHABILITATION

## 2023-11-13 PROCEDURE — 3051F HG A1C>EQUAL 7.0%<8.0%: CPT | Mod: CPTII,S$GLB,, | Performed by: PHYSICAL MEDICINE & REHABILITATION

## 2023-11-13 PROCEDURE — 3078F PR MOST RECENT DIASTOLIC BLOOD PRESSURE < 80 MM HG: ICD-10-PCS | Mod: CPTII,S$GLB,, | Performed by: PHYSICAL MEDICINE & REHABILITATION

## 2023-11-13 PROCEDURE — 3060F PR POS MICROALBUMINURIA RESULT DOCUMENTED/REVIEW: ICD-10-PCS | Mod: CPTII,S$GLB,, | Performed by: PHYSICAL MEDICINE & REHABILITATION

## 2023-11-13 PROCEDURE — 3078F DIAST BP <80 MM HG: CPT | Mod: CPTII,S$GLB,, | Performed by: PHYSICAL MEDICINE & REHABILITATION

## 2023-11-13 PROCEDURE — 99214 OFFICE O/P EST MOD 30 MIN: CPT | Mod: S$GLB,,, | Performed by: PHYSICAL MEDICINE & REHABILITATION

## 2023-11-13 PROCEDURE — 3066F NEPHROPATHY DOC TX: CPT | Mod: CPTII,S$GLB,, | Performed by: PHYSICAL MEDICINE & REHABILITATION

## 2023-11-13 PROCEDURE — 3074F SYST BP LT 130 MM HG: CPT | Mod: CPTII,S$GLB,, | Performed by: PHYSICAL MEDICINE & REHABILITATION

## 2023-11-13 PROCEDURE — 99999 PR PBB SHADOW E&M-EST. PATIENT-LVL IV: ICD-10-PCS | Mod: PBBFAC,,, | Performed by: PHYSICAL MEDICINE & REHABILITATION

## 2023-11-13 PROCEDURE — 3060F POS MICROALBUMINURIA REV: CPT | Mod: CPTII,S$GLB,, | Performed by: PHYSICAL MEDICINE & REHABILITATION

## 2023-11-13 PROCEDURE — 99999 PR PBB SHADOW E&M-EST. PATIENT-LVL IV: CPT | Mod: PBBFAC,,, | Performed by: PHYSICAL MEDICINE & REHABILITATION

## 2023-11-13 PROCEDURE — 1159F MED LIST DOCD IN RCRD: CPT | Mod: CPTII,S$GLB,, | Performed by: PHYSICAL MEDICINE & REHABILITATION

## 2023-11-13 PROCEDURE — 99214 PR OFFICE/OUTPT VISIT, EST, LEVL IV, 30-39 MIN: ICD-10-PCS | Mod: S$GLB,,, | Performed by: PHYSICAL MEDICINE & REHABILITATION

## 2023-11-13 PROCEDURE — 3008F PR BODY MASS INDEX (BMI) DOCUMENTED: ICD-10-PCS | Mod: CPTII,S$GLB,, | Performed by: PHYSICAL MEDICINE & REHABILITATION

## 2023-11-13 NOTE — PROGRESS NOTES
Est Patient Chronic Pain Note (Follow up Visit)      Chief Complaint:   Chief Complaint   Patient presents with    Low-back Pain    Hip Pain        SUBJECTIVE:    Kendrick Nava is a 47 y.o. male presents today for follow-up chronic lower back pain.   He reports that he has had severe worsening lower back pain and left lower extremity pain with weakness.  He reports that he had a fall off of a ladder due to this weakness, but denies any injuries prior to the initiation of this pain flare.  Current pain intensity is 6/10, but states it can increase to 10/10 at its worst.        Patient denies night fever/night sweats, urinary incontinence, bowel incontinence, significant weight loss, significant motor weakness, and loss of sensations.    Pain Disability Index Review:      11/13/2023     8:57 AM 10/18/2023     9:09 AM 7/18/2023     7:25 AM   Last 3 PDI Scores   Pain Disability Index (PDI) 42 [phone]        Interval HPI 10/18/2023:  Kendrick Nava is a 47 y.o. male presents today for follow-up chronic lower back pain.  Current pain intensity is 4/10.  Reports that his pain is stable with use of gabapentin, Celebrex, Flexeril and Norco p.r.n..  He denies any adverse reactions to these medications he reports his pain is well managed with these medications.  He is able to complete his daily task and labor intensive job without any significant issues.    Interval History (7/18/2023):  Kendrick Nava presents today for follow-up visit.  Patient was last seen on 5/2/2023. He reports his leg pain is well-controlled with Gabapentin, he takes 900 mg in the morning and 900 mg at night. Continues to take Celebrex daily and Flexeril nightly with good relief, Norco sparingly for severe. Pain. Reports right sided pain in his lower back that wraps around into his stomach x 3 weeks. Patient reports pain as 4/10 today.    Interval History (5/1/2023): Kendrick Nava presents today for follow-up visit.  he underwent bilateral L5/S1  TF JONATAN on 4/4/23.  The patient reports that he is/was better following the procedure.  he reports initially experienced worsening pain for about a week after the procedure, followed by  95% pain relief x 1 week before relief dwindled to 20%. Reports continued pressure in his lower back and pain across the lumbosacral region in a band like distribution with radiation into his right lower extremity along L5/S1 distribution. Takes Gabapentin and Flexeril nightly with good relief, no side effects. Norco for severe pain.  Patient reports pain as 6/10 today.    Interval HPI 03/13/2023  Kendrick Nava is a 47 y.o. male presents today for injection follow-up.  He was last seen for L5-S1 IL JONATAN on 02/02/2023.  He reports that this resulted in 80+ % relief for the initial 2 weeks, but now has decreased to about 50-60% relief.  He feels that the pain is of the same type and quality and is in the same location..    Interval History (1/13/2023):  Kendrick Nava presents today for follow-up visit.  Patient was last seen on Visit date not found. Patient reports pain as 5/10 today.  He has been performing physician directed exercises targeting sciatic nerve pain for several weeks without improvement. He reports continued pain in his thoracolumbar region but his worst pain is persistently along his lumbosacral region in a band-like distribution with radiation into his right lower extremity along L5/S1 distribution. Reports occasional radiation into his left leg. He installs italo for a living and reports prolonged bending and stooping worsens his symptoms. He reports by the end of the day he walks with a limp.      Initial HPI 08/29/2022  Kendrick Nava is a 47 y.o. male who presents to the clinic for the evaluation of back pain.  He was referred by his primary care provider for further evaluation and management of this pain.  The pain started a few years ago following a fall from a ladder in 2018 and symptoms have been  unchanged.The pain is located in the right thoracolumbar area and radiates to the lumbosacral region with occasional radiation into the left lower extremity extending to the foot and ankle.  The pain is described as aching, numbness, tingling burning and is rated as 6/10. The pain is rated with a score of  4/10 on the BEST day and a score of 8/10 on the WORST day.  Symptoms interfere with daily activity. The pain is exacerbated by work activities, prolonged standing or sitting.  The pain is mitigated by activity modification.       Non-Pharmacologic Treatments:  Physical Therapy/Home Exercise: yes  Ice/Heat:yes  TENS: no  Acupuncture: no  Massage: yes  Chiropractic: yes    Other: no      Pain Medications:  - Opioids:  Tramadol  - Adjuvant Medications:  Mobic  - Anti-Coagulants:  None     report:  Reviewed and consistent with medication use as prescribed.      Pain Procedures:   -02/02/2023:  L5-S1 IL JONATAN, 80% relief for the initial 2 weeks with continued 50-60% relief  - bilateral L5/S1 TF JONATAN on 4/4/23 with 95% relief x 1 week, then 20% relief    Imaging:   Outside CT and MRI of the thoracic lumbar spine reviewed 2018    CT lumbar spine 12/02/2022     FINDINGS:  No acute fracture or dislocation.  Moderate disc height loss T11-12 and T12-L1.  Mild disc height loss at L1-2 through L4-5.  Mild facet arthropathy.  No significant endplate sclerosis.  Mild Schmorl's node formation T12-L1 and L1-2.     SI joints unremarkable.  Mild aortoiliac atherosclerotic calcification.     Impression:     No acute abnormality identified.  Chronic mild discogenic degenerative change as described above    CT lumbar spine 07/22/2018  1.  Acute, traumatic fractures involving the left L2, left L3, and left L4 transverse processes.     2.  Multilevel lumbar degenerative disc disease, spondylosis, and stenoses, as discussed above. This is probably most prominent at L5-S1 on the right, with there is mass effect on and posterior  displacement of the right S1 nerve root within the lateral recess, with right lateral recess stenosis.    X-ray lumbar spine 12/02/2021:  The vertebral bodies of the lumbar spine demonstrate a normal height and alignment.  There appears to be mild chronic vertebral body height loss at the T11 and T12 levels with some spondylosis noted at these levels.  The disc space heights appear to be relatively well maintained.  Mild spondylosis noted anteriorly at the L3-4 and to a lesser degree the L4-5 levels.  No pars defects are noted.       Past Medical History:   Diagnosis Date    Diabetes mellitus, type 2     Hyperlipidemia     Testosterone deficiency     LOW Testosterone     Past Surgical History:   Procedure Laterality Date    EPIDURAL STEROID INJECTION N/A 2/2/2023    Procedure: Lumbar L5/S1 IL JONATAN with right paramedian approach RN IV Sedation;  Surgeon: Nico Angeles MD;  Location: [facility];  Service: Pain Management;  Laterality: N/A;    SELECTIVE INJECTION OF ANESTHETIC AGENT AROUND LUMBAR SPINAL NERVE ROOT BY TRANSFORAMINAL APPROACH Bilateral 4/4/2023    Procedure: Bilateral L5/S1 TF JONATAN;  Surgeon: Nico Angeles MD;  Location: [facility] T;  Service: Pain Management;  Laterality: Bilateral;     Social History     Socioeconomic History    Marital status:    Tobacco Use    Smoking status: Some Days     Types: Vaping with nicotine    Smokeless tobacco: Never   Substance and Sexual Activity    Alcohol use: Yes     Alcohol/week: 1.0 standard drink of alcohol     Types: 1 Shots of liquor per week     Comment: once a month    Drug use: Never    Sexual activity: Yes     Partners: Male     Family History   Problem Relation Age of Onset    Diabetes type II Mother     Diabetes type II Father     Hypertension Father     Diabetes type II Brother     Cancer Maternal Grandmother     Diabetes Maternal Grandfather     No Known Problems Paternal Grandmother     Heart attack Paternal Grandfather        Review of  patient's allergies indicates:  No Known Allergies      Current Outpatient Medications   Medication Sig    celecoxib (CELEBREX) 100 MG capsule Take 1 capsule (100 mg total) by mouth 2 (two) times daily as needed for Pain.    cyclobenzaprine (FLEXERIL) 10 MG tablet Take 1 tablet (10 mg total) by mouth every evening.    empagliflozin (JARDIANCE) 25 mg tablet Take 1 tablet (25 mg total) by mouth once daily.    gabapentin (NEURONTIN) 300 MG capsule Take 3 capsules (900 mg total) by mouth 2 (two) times daily.    HYDROcodone-acetaminophen (NORCO) 5-325 mg per tablet Take 1 tablet by mouth every 6 (six) hours as needed for Pain.    metFORMIN (GLUCOPHAGE-XR) 750 MG ER 24hr tablet Take two tablets with breakfast and one in the evenings.    semaglutide (OZEMPIC) 0.25 mg or 0.5 mg (2 mg/3 mL) pen injector Inject 0.5 mg into the skin every 7 days.    rosuvastatin (CRESTOR) 20 MG tablet Take 1 tablet (20 mg total) by mouth once daily. (Patient not taking: Reported on 11/13/2023)     Current Facility-Administered Medications   Medication    testosterone cypionate injection 400 mg       Review of Systems     GENERAL:  No weight loss, malaise or fevers.  HEENT:   No recent changes in vision or hearing  NECK:  Negative for lumps, no difficulty with swallowing.  RESPIRATORY:  Negative for cough, wheezing or shortness of breath, patient denies any recent URI.  CARDIOVASCULAR:  Negative for chest pain, leg swelling or palpitations.  GI:  Negative for abdominal discomfort, blood in stools or black stools or change in bowel habits.  MUSCULOSKELETAL:  See HPI.  SKIN:  Negative for lesions, rash, and itching.  PSYCH:  No mood disorder or recent psychosocial stressors.  Patients sleep is not disturbed secondary to pain.  HEMATOLOGY/LYMPHOLOGY:  Negative for prolonged bleeding, bruising easily or swollen nodes.  Patient is not currently taking any anti-coagulants  NEURO:   No history of headaches, syncope, paralysis, seizures or  "tremors.  All other reviewed and negative other than HPI.    OBJECTIVE:    /74   Pulse 102   Ht 5' 11" (1.803 m)   Wt 113.1 kg (249 lb 5.4 oz)   BMI 34.78 kg/m²         Physical Exam    GENERAL: Well appearing, in no acute distress, alert and oriented x3.  PSYCH:  Mood and affect appropriate.  SKIN: Skin color, texture, turgor normal, no rashes or lesions.  HEAD/FACE:  Normocephalic, atraumatic. Cranial nerves grossly intact.  CV: RRR with palpation of the radial artery.  PULM: No evidence of respiratory difficulty, symmetric chest rise.  GI:  Soft and non-tender, negative heredia's sign, negative Mcburney's sign, negative CVA tenderness  BACK: Straight leg raising in the sitting and supine positions is positive to radicular pain on the right, equivocal to radicular pain on the left.  Moderate pain to palpation over the facet joints of the lumbar spine and lumbar paraspinals, mostly on the left   Limited range of motion secondary to pain reproduction.  EXTREMITIES: Peripheral joint ROM is full and pain free without obvious instability or laxity in all four extremities. No deformities, edema, or skin discoloration. Good capillary refill.  MUSCULOSKELETAL:  Hip and knee provocative maneuvers are negative.  There is no pain with palpation over the sacroiliac joints bilaterally. Moderated tenderness along right quadratus lumborum. FABERs test is negative.  FADIRs test is negative.   Bilateral upper and lower extremity strength is normal and symmetric  With the exception of left-sided footdrop at 3/5.  No atrophy or tone abnormalities are noted.  NEURO: Bilateral upper and lower extremity coordination and muscle stretch reflexes are physiologic and symmetric.  Plantar response are downgoing. No clonus.  No loss of sensation is noted.  GAIT:  slow, antalgic, steppage gait.      LABS:  Lab Results   Component Value Date    WBC 7.86 02/16/2023    HGB 14.9 02/16/2023    HCT 45.5 02/16/2023    MCV 86 02/16/2023    "  02/16/2023       CMP  Sodium   Date Value Ref Range Status   08/09/2023 140 136 - 145 mmol/L Final     Potassium   Date Value Ref Range Status   08/09/2023 4.8 3.5 - 5.1 mmol/L Final     Chloride   Date Value Ref Range Status   08/09/2023 102 95 - 110 mmol/L Final     CO2   Date Value Ref Range Status   08/09/2023 28 23 - 29 mmol/L Final     Glucose   Date Value Ref Range Status   08/09/2023 171 (H) 70 - 110 mg/dL Final     BUN   Date Value Ref Range Status   08/09/2023 13 6 - 20 mg/dL Final     Creatinine   Date Value Ref Range Status   08/09/2023 1.2 0.5 - 1.4 mg/dL Final     Calcium   Date Value Ref Range Status   08/09/2023 9.3 8.7 - 10.5 mg/dL Final     Total Protein   Date Value Ref Range Status   08/09/2023 7.4 6.0 - 8.4 g/dL Final     Albumin   Date Value Ref Range Status   08/09/2023 4.5 3.5 - 5.2 g/dL Final   11/04/2022 4.8 3.6 - 5.1 g/dL Final     Total Bilirubin   Date Value Ref Range Status   08/09/2023 0.6 0.1 - 1.0 mg/dL Final     Comment:     For infants and newborns, interpretation of results should be based  on gestational age, weight and in agreement with clinical  observations.    Premature Infant recommended reference ranges:  Up to 24 hours.............<8.0 mg/dL  Up to 48 hours............<12.0 mg/dL  3-5 days..................<15.0 mg/dL  6-29 days.................<15.0 mg/dL       Alkaline Phosphatase   Date Value Ref Range Status   08/09/2023 56 55 - 135 U/L Final     AST   Date Value Ref Range Status   08/09/2023 26 10 - 40 U/L Final     ALT   Date Value Ref Range Status   08/09/2023 33 10 - 44 U/L Final     Anion Gap   Date Value Ref Range Status   08/09/2023 10 8 - 16 mmol/L Final     eGFR if    Date Value Ref Range Status   06/06/2022 >60.0 >60 mL/min/1.73 m^2 Final     eGFR if non    Date Value Ref Range Status   06/06/2022 >60.0 >60 mL/min/1.73 m^2 Final     Comment:     Calculation used to obtain the estimated glomerular filtration  rate (eGFR)  is the CKD-EPI equation.          Lab Results   Component Value Date    HGBA1C 7.7 (H) 08/09/2023             ASSESSMENT: 47 y.o. year old male with chronic lower back pain, consistent with     1. Dorsalgia, unspecified  MRI Lumbar Spine Without Contrast      2. Foot drop, left        3. Lumbar radiculopathy        4. Lumbar spondylosis        5. DDD (degenerative disc disease), lumbar                        PLAN:   - Interventions: None at this time, hold off on injection for now, NSG as last resort as patient would like to avoid surgery  -s/p bilateral L5/S1 TF JONATAN on 4/4/23 with 95% relief x 1 week, then 20% relief  s/p L5/S1 IL JONATAN with 80% relief with the initial 2 weeks.      - Anticoagulation use: no       - Medications: I have stressed the importance of physical activity and a home exercise plan to help with pain and improve health. and Patient can continue with medications for now since they are providing benefits, using them appropriately, and without side effects.        continue Norco 5/325 mg Q 8 p.r.n., recently filled 11/10/2023  - Continue Gabapentin 900 mg BID  -Continue Celebrex 100 mg BID for day time pain/inflammation  -Continue Flexeril 10 mg nightly        - Therapy:  Advised patient continue with activities as tolerated    - Psychological:  Discussed coping mechanisms to help address chronic pain issues    - Labs:  Reviewed    - Imaging: Reviewed available imaging with patient and answered any questions they had regarding study.   Will obtain stat MRI of the lumbar spine due to newly acquired footdrop and progressive neurological deficit    - Consults/Referrals:  None at this time    - Records:  Reviewed/Obtain old records from outside physicians and imaging    - Follow up visit  as needed, will contact with MRI results    - Counseled patient regarding the importance of activity modification and physical therapy    - This condition does not require this patient to take time off of work, and  the primary goal of our Pain Management services is to improve the patient's functional capacity.    - Patient Questions: Answered all of the patient's questions regarding diagnosis, therapy, and treatment        The above plan and management options were discussed at length with patient. Patient is in agreement with the above and verbalized understanding.    I discussed the goals of interventional chronic pain management with the patient on today's visit.  I explained the utility of injections for diagnostic and therapeutic purposes.  We discussed a multimodal approach to pain including treating the patient's given worst pain at any given time.  We will use a systematic approach to addressing pain.  We will also adopt a multimodal approach that includes injections, adjuvant medications, physical therapy, at times psychiatry.  There may be a limited role for opioid use intermittently in the treatment of pain, more particularly for acute pain although no one approach can be used as a sole treatment modality.    I emphasized the importance of regular exercise, core strengthening and stretching, diet and weight loss as a cornerstone of long-term pain management.      Nico Angeles MD  Interventional Pain Management  [facility]    Disclaimer:  This note was prepared using voice recognition system and is likely to have sound alike errors that may have been overlooked even after proof reading.  Please call me with any questions

## 2023-11-13 NOTE — H&P (VIEW-ONLY)
Est Patient Chronic Pain Note (Follow up Visit)      Chief Complaint:   Chief Complaint   Patient presents with    Low-back Pain    Hip Pain        SUBJECTIVE:    Kendrick Nava is a 47 y.o. male presents today for follow-up chronic lower back pain.   He reports that he has had severe worsening lower back pain and left lower extremity pain with weakness.  He reports that he had a fall off of a ladder due to this weakness, but denies any injuries prior to the initiation of this pain flare.  Current pain intensity is 6/10, but states it can increase to 10/10 at its worst.        Patient denies night fever/night sweats, urinary incontinence, bowel incontinence, significant weight loss, significant motor weakness, and loss of sensations.    Pain Disability Index Review:      11/13/2023     8:57 AM 10/18/2023     9:09 AM 7/18/2023     7:25 AM   Last 3 PDI Scores   Pain Disability Index (PDI) 42 [phone]        Interval HPI 10/18/2023:  Kendrick Nava is a 47 y.o. male presents today for follow-up chronic lower back pain.  Current pain intensity is 4/10.  Reports that his pain is stable with use of gabapentin, Celebrex, Flexeril and Norco p.r.n..  He denies any adverse reactions to these medications he reports his pain is well managed with these medications.  He is able to complete his daily task and labor intensive job without any significant issues.    Interval History (7/18/2023):  Kendrick Nava presents today for follow-up visit.  Patient was last seen on 5/2/2023. He reports his leg pain is well-controlled with Gabapentin, he takes 900 mg in the morning and 900 mg at night. Continues to take Celebrex daily and Flexeril nightly with good relief, Norco sparingly for severe. Pain. Reports right sided pain in his lower back that wraps around into his stomach x 3 weeks. Patient reports pain as 4/10 today.    Interval History (5/1/2023): Kendrick Nava presents today for follow-up visit.  he underwent bilateral L5/S1  TF JONATAN on 4/4/23.  The patient reports that he is/was better following the procedure.  he reports initially experienced worsening pain for about a week after the procedure, followed by  95% pain relief x 1 week before relief dwindled to 20%. Reports continued pressure in his lower back and pain across the lumbosacral region in a band like distribution with radiation into his right lower extremity along L5/S1 distribution. Takes Gabapentin and Flexeril nightly with good relief, no side effects. Norco for severe pain.  Patient reports pain as 6/10 today.    Interval HPI 03/13/2023  Kendrick Nava is a 47 y.o. male presents today for injection follow-up.  He was last seen for L5-S1 IL JONATAN on 02/02/2023.  He reports that this resulted in 80+ % relief for the initial 2 weeks, but now has decreased to about 50-60% relief.  He feels that the pain is of the same type and quality and is in the same location..    Interval History (1/13/2023):  Kendrick Nava presents today for follow-up visit.  Patient was last seen on Visit date not found. Patient reports pain as 5/10 today.  He has been performing physician directed exercises targeting sciatic nerve pain for several weeks without improvement. He reports continued pain in his thoracolumbar region but his worst pain is persistently along his lumbosacral region in a band-like distribution with radiation into his right lower extremity along L5/S1 distribution. Reports occasional radiation into his left leg. He installs italo for a living and reports prolonged bending and stooping worsens his symptoms. He reports by the end of the day he walks with a limp.      Initial HPI 08/29/2022  Kendrick Nava is a 47 y.o. male who presents to the clinic for the evaluation of back pain.  He was referred by his primary care provider for further evaluation and management of this pain.  The pain started a few years ago following a fall from a ladder in 2018 and symptoms have been  unchanged.The pain is located in the right thoracolumbar area and radiates to the lumbosacral region with occasional radiation into the left lower extremity extending to the foot and ankle.  The pain is described as aching, numbness, tingling burning and is rated as 6/10. The pain is rated with a score of  4/10 on the BEST day and a score of 8/10 on the WORST day.  Symptoms interfere with daily activity. The pain is exacerbated by work activities, prolonged standing or sitting.  The pain is mitigated by activity modification.       Non-Pharmacologic Treatments:  Physical Therapy/Home Exercise: yes  Ice/Heat:yes  TENS: no  Acupuncture: no  Massage: yes  Chiropractic: yes    Other: no      Pain Medications:  - Opioids:  Tramadol  - Adjuvant Medications:  Mobic  - Anti-Coagulants:  None     report:  Reviewed and consistent with medication use as prescribed.      Pain Procedures:   -02/02/2023:  L5-S1 IL JONATAN, 80% relief for the initial 2 weeks with continued 50-60% relief  - bilateral L5/S1 TF JONATAN on 4/4/23 with 95% relief x 1 week, then 20% relief    Imaging:   Outside CT and MRI of the thoracic lumbar spine reviewed 2018    CT lumbar spine 12/02/2022     FINDINGS:  No acute fracture or dislocation.  Moderate disc height loss T11-12 and T12-L1.  Mild disc height loss at L1-2 through L4-5.  Mild facet arthropathy.  No significant endplate sclerosis.  Mild Schmorl's node formation T12-L1 and L1-2.     SI joints unremarkable.  Mild aortoiliac atherosclerotic calcification.     Impression:     No acute abnormality identified.  Chronic mild discogenic degenerative change as described above    CT lumbar spine 07/22/2018  1.  Acute, traumatic fractures involving the left L2, left L3, and left L4 transverse processes.     2.  Multilevel lumbar degenerative disc disease, spondylosis, and stenoses, as discussed above. This is probably most prominent at L5-S1 on the right, with there is mass effect on and posterior  displacement of the right S1 nerve root within the lateral recess, with right lateral recess stenosis.    X-ray lumbar spine 12/02/2021:  The vertebral bodies of the lumbar spine demonstrate a normal height and alignment.  There appears to be mild chronic vertebral body height loss at the T11 and T12 levels with some spondylosis noted at these levels.  The disc space heights appear to be relatively well maintained.  Mild spondylosis noted anteriorly at the L3-4 and to a lesser degree the L4-5 levels.  No pars defects are noted.       Past Medical History:   Diagnosis Date    Diabetes mellitus, type 2     Hyperlipidemia     Testosterone deficiency     LOW Testosterone     Past Surgical History:   Procedure Laterality Date    EPIDURAL STEROID INJECTION N/A 2/2/2023    Procedure: Lumbar L5/S1 IL JONATAN with right paramedian approach RN IV Sedation;  Surgeon: Nico Angeles MD;  Location: [facility] [place];  Service: Pain Management;  Laterality: N/A;    SELECTIVE INJECTION OF ANESTHETIC AGENT AROUND LUMBAR SPINAL NERVE ROOT BY TRANSFORAMINAL APPROACH Bilateral 4/4/2023    Procedure: Bilateral L5/S1 TF JONATAN;  Surgeon: Nico Angeles MD;  Location: [facility] PAIN T;  Service: Pain Management;  Laterality: Bilateral;     Social History     Socioeconomic History    Marital status:    Tobacco Use    Smoking status: Some Days     Types: Vaping with nicotine    Smokeless tobacco: Never   Substance and Sexual Activity    Alcohol use: Yes     Alcohol/week: 1.0 standard drink of alcohol     Types: 1 Shots of liquor per week     Comment: once a month    Drug use: Never    Sexual activity: Yes     Partners: Male     Family History   Problem Relation Age of Onset    Diabetes type II Mother     Diabetes type II Father     Hypertension Father     Diabetes type II Brother     Cancer Maternal Grandmother     Diabetes Maternal Grandfather     No Known Problems Paternal Grandmother     Heart attack Paternal Grandfather        Review of  patient's allergies indicates:  No Known Allergies      Current Outpatient Medications   Medication Sig    celecoxib (CELEBREX) 100 MG capsule Take 1 capsule (100 mg total) by mouth 2 (two) times daily as needed for Pain.    cyclobenzaprine (FLEXERIL) 10 MG tablet Take 1 tablet (10 mg total) by mouth every evening.    empagliflozin (JARDIANCE) 25 mg tablet Take 1 tablet (25 mg total) by mouth once daily.    gabapentin (NEURONTIN) 300 MG capsule Take 3 capsules (900 mg total) by mouth 2 (two) times daily.    HYDROcodone-acetaminophen (NORCO) 5-325 mg per tablet Take 1 tablet by mouth every 6 (six) hours as needed for Pain.    metFORMIN (GLUCOPHAGE-XR) 750 MG ER 24hr tablet Take two tablets with breakfast and one in the evenings.    semaglutide (OZEMPIC) 0.25 mg or 0.5 mg (2 mg/3 mL) pen injector Inject 0.5 mg into the skin every 7 days.    rosuvastatin (CRESTOR) 20 MG tablet Take 1 tablet (20 mg total) by mouth once daily. (Patient not taking: Reported on 11/13/2023)     Current Facility-Administered Medications   Medication    testosterone cypionate injection 400 mg       Review of Systems     GENERAL:  No weight loss, malaise or fevers.  HEENT:   No recent changes in vision or hearing  NECK:  Negative for lumps, no difficulty with swallowing.  RESPIRATORY:  Negative for cough, wheezing or shortness of breath, patient denies any recent URI.  CARDIOVASCULAR:  Negative for chest pain, leg swelling or palpitations.  GI:  Negative for abdominal discomfort, blood in stools or black stools or change in bowel habits.  MUSCULOSKELETAL:  See HPI.  SKIN:  Negative for lesions, rash, and itching.  PSYCH:  No mood disorder or recent psychosocial stressors.  Patients sleep is not disturbed secondary to pain.  HEMATOLOGY/LYMPHOLOGY:  Negative for prolonged bleeding, bruising easily or swollen nodes.  Patient is not currently taking any anti-coagulants  NEURO:   No history of headaches, syncope, paralysis, seizures or  "tremors.  All other reviewed and negative other than HPI.    OBJECTIVE:    /74   Pulse 102   Ht 5' 11" (1.803 m)   Wt 113.1 kg (249 lb 5.4 oz)   BMI 34.78 kg/m²         Physical Exam    GENERAL: Well appearing, in no acute distress, alert and oriented x3.  PSYCH:  Mood and affect appropriate.  SKIN: Skin color, texture, turgor normal, no rashes or lesions.  HEAD/FACE:  Normocephalic, atraumatic. Cranial nerves grossly intact.  CV: RRR with palpation of the radial artery.  PULM: No evidence of respiratory difficulty, symmetric chest rise.  GI:  Soft and non-tender, negative heredia's sign, negative Mcburney's sign, negative CVA tenderness  BACK: Straight leg raising in the sitting and supine positions is positive to radicular pain on the right, equivocal to radicular pain on the left.  Moderate pain to palpation over the facet joints of the lumbar spine and lumbar paraspinals, mostly on the left   Limited range of motion secondary to pain reproduction.  EXTREMITIES: Peripheral joint ROM is full and pain free without obvious instability or laxity in all four extremities. No deformities, edema, or skin discoloration. Good capillary refill.  MUSCULOSKELETAL:  Hip and knee provocative maneuvers are negative.  There is no pain with palpation over the sacroiliac joints bilaterally. Moderated tenderness along right quadratus lumborum. FABERs test is negative.  FADIRs test is negative.   Bilateral upper and lower extremity strength is normal and symmetric  With the exception of left-sided footdrop at 3/5.  No atrophy or tone abnormalities are noted.  NEURO: Bilateral upper and lower extremity coordination and muscle stretch reflexes are physiologic and symmetric.  Plantar response are downgoing. No clonus.  No loss of sensation is noted.  GAIT:  slow, antalgic, steppage gait.      LABS:  Lab Results   Component Value Date    WBC 7.86 02/16/2023    HGB 14.9 02/16/2023    HCT 45.5 02/16/2023    MCV 86 02/16/2023    "  02/16/2023       CMP  Sodium   Date Value Ref Range Status   08/09/2023 140 136 - 145 mmol/L Final     Potassium   Date Value Ref Range Status   08/09/2023 4.8 3.5 - 5.1 mmol/L Final     Chloride   Date Value Ref Range Status   08/09/2023 102 95 - 110 mmol/L Final     CO2   Date Value Ref Range Status   08/09/2023 28 23 - 29 mmol/L Final     Glucose   Date Value Ref Range Status   08/09/2023 171 (H) 70 - 110 mg/dL Final     BUN   Date Value Ref Range Status   08/09/2023 13 6 - 20 mg/dL Final     Creatinine   Date Value Ref Range Status   08/09/2023 1.2 0.5 - 1.4 mg/dL Final     Calcium   Date Value Ref Range Status   08/09/2023 9.3 8.7 - 10.5 mg/dL Final     Total Protein   Date Value Ref Range Status   08/09/2023 7.4 6.0 - 8.4 g/dL Final     Albumin   Date Value Ref Range Status   08/09/2023 4.5 3.5 - 5.2 g/dL Final   11/04/2022 4.8 3.6 - 5.1 g/dL Final     Total Bilirubin   Date Value Ref Range Status   08/09/2023 0.6 0.1 - 1.0 mg/dL Final     Comment:     For infants and newborns, interpretation of results should be based  on gestational age, weight and in agreement with clinical  observations.    Premature Infant recommended reference ranges:  Up to 24 hours.............<8.0 mg/dL  Up to 48 hours............<12.0 mg/dL  3-5 days..................<15.0 mg/dL  6-29 days.................<15.0 mg/dL       Alkaline Phosphatase   Date Value Ref Range Status   08/09/2023 56 55 - 135 U/L Final     AST   Date Value Ref Range Status   08/09/2023 26 10 - 40 U/L Final     ALT   Date Value Ref Range Status   08/09/2023 33 10 - 44 U/L Final     Anion Gap   Date Value Ref Range Status   08/09/2023 10 8 - 16 mmol/L Final     eGFR if    Date Value Ref Range Status   06/06/2022 >60.0 >60 mL/min/1.73 m^2 Final     eGFR if non    Date Value Ref Range Status   06/06/2022 >60.0 >60 mL/min/1.73 m^2 Final     Comment:     Calculation used to obtain the estimated glomerular filtration  rate (eGFR)  is the CKD-EPI equation.          Lab Results   Component Value Date    HGBA1C 7.7 (H) 08/09/2023             ASSESSMENT: 47 y.o. year old male with chronic lower back pain, consistent with     1. Dorsalgia, unspecified  MRI Lumbar Spine Without Contrast      2. Foot drop, left        3. Lumbar radiculopathy        4. Lumbar spondylosis        5. DDD (degenerative disc disease), lumbar                        PLAN:   - Interventions: None at this time, hold off on injection for now, NSG as last resort as patient would like to avoid surgery  -s/p bilateral L5/S1 TF JONATAN on 4/4/23 with 95% relief x 1 week, then 20% relief  s/p L5/S1 IL JONATAN with 80% relief with the initial 2 weeks.      - Anticoagulation use: no       - Medications: I have stressed the importance of physical activity and a home exercise plan to help with pain and improve health. and Patient can continue with medications for now since they are providing benefits, using them appropriately, and without side effects.        continue Norco 5/325 mg Q 8 p.r.n., recently filled 11/10/2023  - Continue Gabapentin 900 mg BID  -Continue Celebrex 100 mg BID for day time pain/inflammation  -Continue Flexeril 10 mg nightly        - Therapy:  Advised patient continue with activities as tolerated    - Psychological:  Discussed coping mechanisms to help address chronic pain issues    - Labs:  Reviewed    - Imaging: Reviewed available imaging with patient and answered any questions they had regarding study.   Will obtain stat MRI of the lumbar spine due to newly acquired footdrop and progressive neurological deficit    - Consults/Referrals:  None at this time    - Records:  Reviewed/Obtain old records from outside physicians and imaging    - Follow up visit  as needed, will contact with MRI results    - Counseled patient regarding the importance of activity modification and physical therapy    - This condition does not require this patient to take time off of work, and  the primary goal of our Pain Management services is to improve the patient's functional capacity.    - Patient Questions: Answered all of the patient's questions regarding diagnosis, therapy, and treatment        The above plan and management options were discussed at length with patient. Patient is in agreement with the above and verbalized understanding.    I discussed the goals of interventional chronic pain management with the patient on today's visit.  I explained the utility of injections for diagnostic and therapeutic purposes.  We discussed a multimodal approach to pain including treating the patient's given worst pain at any given time.  We will use a systematic approach to addressing pain.  We will also adopt a multimodal approach that includes injections, adjuvant medications, physical therapy, at times psychiatry.  There may be a limited role for opioid use intermittently in the treatment of pain, more particularly for acute pain although no one approach can be used as a sole treatment modality.    I emphasized the importance of regular exercise, core strengthening and stretching, diet and weight loss as a cornerstone of long-term pain management.      Nico Angeles MD  Interventional Pain Management  [facility]    Disclaimer:  This note was prepared using voice recognition system and is likely to have sound alike errors that may have been overlooked even after proof reading.  Please call me with any questions

## 2023-11-15 ENCOUNTER — TELEPHONE (OUTPATIENT)
Dept: PAIN MEDICINE | Facility: CLINIC | Age: 48
End: 2023-11-15
Payer: COMMERCIAL

## 2023-11-15 NOTE — TELEPHONE ENCOUNTER
Called pt and informed him that his MRI appt was Friday. Pt verbalized understanding.    González DAINEL

## 2023-11-15 NOTE — TELEPHONE ENCOUNTER
Called pt and pt informed me that he has an mri appt on 11/16/2023 and that he would need to move the date to a later date being that it wasn't approved by insurance. I proceeded to get patient rescheduled for his MRI and called Pre service and expedite the situation.    González DANIEL

## 2023-11-17 ENCOUNTER — CLINICAL SUPPORT (OUTPATIENT)
Dept: INTERNAL MEDICINE | Facility: CLINIC | Age: 48
End: 2023-11-17
Payer: COMMERCIAL

## 2023-11-17 ENCOUNTER — HOSPITAL ENCOUNTER (OUTPATIENT)
Dept: RADIOLOGY | Facility: HOSPITAL | Age: 48
Discharge: HOME | End: 2023-11-17
Attending: PHYSICAL MEDICINE & REHABILITATION
Payer: COMMERCIAL

## 2023-11-17 DIAGNOSIS — M54.16 LUMBAR RADICULOPATHY: ICD-10-CM

## 2023-11-17 DIAGNOSIS — E34.9 TESTOSTERONE DEFICIENCY: Primary | ICD-10-CM

## 2023-11-17 DIAGNOSIS — M21.372 FOOT DROP, LEFT: Primary | ICD-10-CM

## 2023-11-17 DIAGNOSIS — M54.9 DORSALGIA, UNSPECIFIED: ICD-10-CM

## 2023-11-17 DIAGNOSIS — M48.062 SPINAL STENOSIS OF LUMBAR REGION WITH NEUROGENIC CLAUDICATION: ICD-10-CM

## 2023-11-17 PROCEDURE — 72148 MRI LUMBAR SPINE W/O DYE: CPT | Mod: 26,,, | Performed by: STUDENT IN AN ORGANIZED HEALTH CARE EDUCATION/TRAINING PROGRAM

## 2023-11-17 PROCEDURE — 99999 PR PBB SHADOW E&M-EST. PATIENT-LVL II: ICD-10-PCS | Mod: PBBFAC,,,

## 2023-11-17 PROCEDURE — 96372 THER/PROPH/DIAG INJ SC/IM: CPT | Mod: S$GLB,,, | Performed by: FAMILY MEDICINE

## 2023-11-17 PROCEDURE — 72148 MRI LUMBAR SPINE W/O DYE: CPT | Mod: TC,PN

## 2023-11-17 PROCEDURE — 99999 PR PBB SHADOW E&M-EST. PATIENT-LVL II: CPT | Mod: PBBFAC,,,

## 2023-11-17 PROCEDURE — 96372 PR INJECTION,THERAP/PROPH/DIAG2ST, IM OR SUBCUT: ICD-10-PCS | Mod: S$GLB,,, | Performed by: FAMILY MEDICINE

## 2023-11-17 PROCEDURE — 72148 MRI LUMBAR SPINE WITHOUT CONTRAST: ICD-10-PCS | Mod: 26,,, | Performed by: STUDENT IN AN ORGANIZED HEALTH CARE EDUCATION/TRAINING PROGRAM

## 2023-11-17 RX ADMIN — TESTOSTERONE CYPIONATE 400 MG: 200 INJECTION, SOLUTION INTRAMUSCULAR at 09:11

## 2023-11-17 NOTE — PROGRESS NOTES
Patient came into clinic today for DepoTestosterone 400mg as ordered by . Pt instructed to wait 15 minutes following injection for possible reaction. Pt verbalized understanding and tolerated injection well.     Lot # [id number]  Expires 06/2025

## 2023-11-19 DIAGNOSIS — E11.9 TYPE 2 DIABETES MELLITUS WITHOUT COMPLICATION, WITHOUT LONG-TERM CURRENT USE OF INSULIN: ICD-10-CM

## 2023-11-20 DIAGNOSIS — M54.16 LUMBAR RADICULOPATHY: Primary | ICD-10-CM

## 2023-11-20 NOTE — TELEPHONE ENCOUNTER
Refill Routing Note   Medication(s) are not appropriate for processing by [facility] for the following reason(s):        New or recently adjusted medication    [facility] action(s):  Defer        Medication Therapy Plan: FLOS      Appointments  past 12m or future 3m with PCP    Date Provider   Last Visit   8/25/2023 Genevieve Mclaughlin MD   Next Visit   11/28/2023 Genevieve Mclaughlin MD   ED visits in past 90 days: 0        Note composed:11:17 PM 11/19/2023

## 2023-11-20 NOTE — TELEPHONE ENCOUNTER
Care Due:                  Date            Visit Type   Department     Provider  --------------------------------------------------------------------------------                                EP -                              PRIMARY      [facility] INTERNAL  Last Visit: 08-      CARE ([facility])   MEDICINE       Genevieve Mclaughlin                              EP -                              PRIMARY      [facility] INTERNAL  Next Visit: 11-      CARE ([facility])   MEDICINE       Genevieve Mclaughlin                                                            Last  Test          Frequency    Reason                     Performed    Due Date  --------------------------------------------------------------------------------    HBA1C.......  6 months...  empagliflozin, metFORMIN,   08-   02-                             semaglutide..............    [employer] Embedded Care Due Messages. Reference number: [id number].   11/19/2023 7:13:20 PM CST

## 2023-11-21 ENCOUNTER — LAB VISIT (OUTPATIENT)
Dept: LAB | Facility: HOSPITAL | Age: 48
End: 2023-11-21
Attending: FAMILY MEDICINE
Payer: COMMERCIAL

## 2023-11-21 DIAGNOSIS — E11.9 TYPE 2 DIABETES MELLITUS WITHOUT COMPLICATION, WITHOUT LONG-TERM CURRENT USE OF INSULIN: ICD-10-CM

## 2023-11-21 LAB
ALBUMIN SERPL BCP-MCNC: 4.2 G/DL (ref 3.5–5.2)
ALP SERPL-CCNC: 51 U/L (ref 55–135)
ALT SERPL W/O P-5'-P-CCNC: 30 U/L (ref 10–44)
ANION GAP SERPL CALC-SCNC: 9 MMOL/L (ref 8–16)
AST SERPL-CCNC: 25 U/L (ref 10–40)
BILIRUB SERPL-MCNC: 0.7 MG/DL (ref 0.1–1)
BUN SERPL-MCNC: 14 MG/DL (ref 6–20)
CALCIUM SERPL-MCNC: 8.8 MG/DL (ref 8.7–10.5)
CHLORIDE SERPL-SCNC: 102 MMOL/L (ref 95–110)
CO2 SERPL-SCNC: 29 MMOL/L (ref 23–29)
CREAT SERPL-MCNC: 1 MG/DL (ref 0.5–1.4)
EST. GFR  (NO RACE VARIABLE): >60 ML/MIN/1.73 M^2
ESTIMATED AVG GLUCOSE: 143 MG/DL (ref 68–131)
GLUCOSE SERPL-MCNC: 131 MG/DL (ref 70–110)
HBA1C MFR BLD: 6.6 % (ref 4–5.6)
POTASSIUM SERPL-SCNC: 4.1 MMOL/L (ref 3.5–5.1)
PROT SERPL-MCNC: 6.9 G/DL (ref 6–8.4)
SODIUM SERPL-SCNC: 140 MMOL/L (ref 136–145)

## 2023-11-21 PROCEDURE — 83036 HEMOGLOBIN GLYCOSYLATED A1C: CPT | Performed by: FAMILY MEDICINE

## 2023-11-21 PROCEDURE — 36415 COLL VENOUS BLD VENIPUNCTURE: CPT | Mod: PO | Performed by: FAMILY MEDICINE

## 2023-11-21 PROCEDURE — 80053 COMPREHEN METABOLIC PANEL: CPT | Performed by: FAMILY MEDICINE

## 2023-11-22 RX ORDER — SEMAGLUTIDE 0.68 MG/ML
INJECTION, SOLUTION SUBCUTANEOUS
Qty: 3 ML | Refills: 1 | Status: SHIPPED | OUTPATIENT
Start: 2023-11-22 | End: 2023-12-01

## 2023-11-27 DIAGNOSIS — M47.816 LUMBAR SPONDYLOSIS: ICD-10-CM

## 2023-11-27 DIAGNOSIS — M54.16 LUMBAR RADICULOPATHY: ICD-10-CM

## 2023-11-27 RX ORDER — HYDROCODONE BITARTRATE AND ACETAMINOPHEN 5; 325 MG/1; MG/1
1 TABLET ORAL EVERY 6 HOURS PRN
Qty: 21 TABLET | Refills: 0 | Status: SHIPPED | OUTPATIENT
Start: 2023-11-27 | End: 2023-12-11 | Stop reason: SDUPTHER

## 2023-11-28 RX ORDER — CYCLOBENZAPRINE HCL 10 MG
10 TABLET ORAL NIGHTLY
Qty: 30 TABLET | Refills: 1 | Status: SHIPPED | OUTPATIENT
Start: 2023-11-28 | End: 2024-02-03 | Stop reason: SDUPTHER

## 2023-11-28 RX ORDER — CELECOXIB 100 MG/1
100 CAPSULE ORAL 2 TIMES DAILY PRN
Qty: 60 CAPSULE | Refills: 2 | Status: SHIPPED | OUTPATIENT
Start: 2023-11-28 | End: 2023-12-01

## 2023-11-28 RX ORDER — GABAPENTIN 300 MG/1
900 CAPSULE ORAL 2 TIMES DAILY
Qty: 180 CAPSULE | Refills: 2 | Status: SHIPPED | OUTPATIENT
Start: 2023-11-28 | End: 2023-12-30 | Stop reason: SDUPTHER

## 2023-11-29 NOTE — PRE-PROCEDURE INSTRUCTIONS
Spoke with patient regarding procedure scheduled on 12.7     Arrival time 0645     Has patient been sick with fever or on antibiotics within the last 7 days? No     Does the patient have any open wounds, sores or rashes? No     Does the patient have any recent fractures? no     Has patient received a vaccination within the last 7 days? No     Received the COVID vaccination? yes     Has the patient stopped all medications as directed? hold dm meds am of procedure     Does patient have a pacemaker, defibrillator, or implantable stimulator? No     Does the patient have a ride to and from procedure and someone reliable to remain with patient?  wife     Is the patient diabetic? yes     Does the patient have sleep apnea? Or use O2 at home? No and no     Is the patient receiving sedation? yes     Is the patient instructed to remain NPO beginning at midnight the night before their procedure? yes     Procedure location confirmed with patient? Yes     Covid- Denies signs/symptoms. Instructed to notify PAT/MD if any changes.

## 2023-12-01 ENCOUNTER — OFFICE VISIT (OUTPATIENT)
Dept: INTERNAL MEDICINE | Facility: CLINIC | Age: 48
End: 2023-12-01
Payer: COMMERCIAL

## 2023-12-01 VITALS
SYSTOLIC BLOOD PRESSURE: 116 MMHG | OXYGEN SATURATION: 97 % | BODY MASS INDEX: 34.94 KG/M2 | HEART RATE: 88 BPM | HEIGHT: 71 IN | DIASTOLIC BLOOD PRESSURE: 72 MMHG | TEMPERATURE: 98 F | WEIGHT: 249.56 LBS

## 2023-12-01 DIAGNOSIS — E11.9 TYPE 2 DIABETES MELLITUS WITHOUT COMPLICATION, WITHOUT LONG-TERM CURRENT USE OF INSULIN: Primary | ICD-10-CM

## 2023-12-01 DIAGNOSIS — M54.16 LUMBAR RADICULOPATHY: ICD-10-CM

## 2023-12-01 PROCEDURE — 96372 PR INJECTION,THERAP/PROPH/DIAG2ST, IM OR SUBCUT: ICD-10-PCS | Mod: S$GLB,,, | Performed by: FAMILY MEDICINE

## 2023-12-01 PROCEDURE — 3078F PR MOST RECENT DIASTOLIC BLOOD PRESSURE < 80 MM HG: ICD-10-PCS | Mod: CPTII,S$GLB,, | Performed by: FAMILY MEDICINE

## 2023-12-01 PROCEDURE — 1159F PR MEDICATION LIST DOCUMENTED IN MEDICAL RECORD: ICD-10-PCS | Mod: CPTII,S$GLB,, | Performed by: FAMILY MEDICINE

## 2023-12-01 PROCEDURE — 3074F PR MOST RECENT SYSTOLIC BLOOD PRESSURE < 130 MM HG: ICD-10-PCS | Mod: CPTII,S$GLB,, | Performed by: FAMILY MEDICINE

## 2023-12-01 PROCEDURE — 99999 PR PBB SHADOW E&M-EST. PATIENT-LVL III: ICD-10-PCS | Mod: PBBFAC,,, | Performed by: FAMILY MEDICINE

## 2023-12-01 PROCEDURE — 3066F NEPHROPATHY DOC TX: CPT | Mod: CPTII,S$GLB,, | Performed by: FAMILY MEDICINE

## 2023-12-01 PROCEDURE — 99214 PR OFFICE/OUTPT VISIT, EST, LEVL IV, 30-39 MIN: ICD-10-PCS | Mod: 25,S$GLB,, | Performed by: FAMILY MEDICINE

## 2023-12-01 PROCEDURE — 3060F PR POS MICROALBUMINURIA RESULT DOCUMENTED/REVIEW: ICD-10-PCS | Mod: CPTII,S$GLB,, | Performed by: FAMILY MEDICINE

## 2023-12-01 PROCEDURE — 3044F PR MOST RECENT HEMOGLOBIN A1C LEVEL <7.0%: ICD-10-PCS | Mod: CPTII,S$GLB,, | Performed by: FAMILY MEDICINE

## 2023-12-01 PROCEDURE — 3074F SYST BP LT 130 MM HG: CPT | Mod: CPTII,S$GLB,, | Performed by: FAMILY MEDICINE

## 2023-12-01 PROCEDURE — 99214 OFFICE O/P EST MOD 30 MIN: CPT | Mod: 25,S$GLB,, | Performed by: FAMILY MEDICINE

## 2023-12-01 PROCEDURE — 3060F POS MICROALBUMINURIA REV: CPT | Mod: CPTII,S$GLB,, | Performed by: FAMILY MEDICINE

## 2023-12-01 PROCEDURE — 3008F BODY MASS INDEX DOCD: CPT | Mod: CPTII,S$GLB,, | Performed by: FAMILY MEDICINE

## 2023-12-01 PROCEDURE — 3008F PR BODY MASS INDEX (BMI) DOCUMENTED: ICD-10-PCS | Mod: CPTII,S$GLB,, | Performed by: FAMILY MEDICINE

## 2023-12-01 PROCEDURE — 3044F HG A1C LEVEL LT 7.0%: CPT | Mod: CPTII,S$GLB,, | Performed by: FAMILY MEDICINE

## 2023-12-01 PROCEDURE — 3066F PR DOCUMENTATION OF TREATMENT FOR NEPHROPATHY: ICD-10-PCS | Mod: CPTII,S$GLB,, | Performed by: FAMILY MEDICINE

## 2023-12-01 PROCEDURE — 99999 PR PBB SHADOW E&M-EST. PATIENT-LVL III: CPT | Mod: PBBFAC,,, | Performed by: FAMILY MEDICINE

## 2023-12-01 PROCEDURE — 96372 THER/PROPH/DIAG INJ SC/IM: CPT | Mod: S$GLB,,, | Performed by: FAMILY MEDICINE

## 2023-12-01 PROCEDURE — 3078F DIAST BP <80 MM HG: CPT | Mod: CPTII,S$GLB,, | Performed by: FAMILY MEDICINE

## 2023-12-01 PROCEDURE — 1159F MED LIST DOCD IN RCRD: CPT | Mod: CPTII,S$GLB,, | Performed by: FAMILY MEDICINE

## 2023-12-01 RX ORDER — PREGABALIN 300 MG/1
300 CAPSULE ORAL 2 TIMES DAILY
Qty: 60 CAPSULE | Refills: 0 | Status: SHIPPED | OUTPATIENT
Start: 2023-12-01 | End: 2024-01-02

## 2023-12-01 RX ORDER — SEMAGLUTIDE 1.34 MG/ML
1 INJECTION, SOLUTION SUBCUTANEOUS
Qty: 3 ML | Refills: 11 | Status: SHIPPED | OUTPATIENT
Start: 2023-12-01 | End: 2023-12-30 | Stop reason: SDUPTHER

## 2023-12-01 RX ORDER — CELECOXIB 200 MG/1
200 CAPSULE ORAL 2 TIMES DAILY
Qty: 60 CAPSULE | Refills: 1 | Status: SHIPPED | OUTPATIENT
Start: 2023-12-01 | End: 2024-03-09 | Stop reason: SDUPTHER

## 2023-12-01 RX ADMIN — TESTOSTERONE CYPIONATE 400 MG: 200 INJECTION, SOLUTION INTRAMUSCULAR at 03:12

## 2023-12-01 NOTE — PROGRESS NOTES
Gave patient DepoTestosterone 400mg injection as ordered by . Pt instructed to wait 15 minutes following injection for possible reaction. Pt verbalized understanding and tolerated injection well.     lot # [id number]  expires 04/2025

## 2023-12-03 NOTE — PROGRESS NOTES
Subjective:      Patient ID: Kendrick Nava is a 48 y.o. male.    Chief Complaint: Follow-up      The patient is here today for routine follow up. Labs drawn earlier discussed today with the patient.  A1c is improved to 6.7, tolerating Ozempic without difficulty  Renal function normal, blood pressure well controlled.  He is still having severe lower back pain with radiation down to the legs.  Had seen pain management, not scheduled for another injection until next week.  Pain is the worst in his ever been, he has not been able to work in about 3 weeks now    Follow-up  Associated symptoms include arthralgias.     Review of Systems   Constitutional:  Positive for activity change and appetite change (Secondary to Ozempic).   Musculoskeletal:  Positive for arthralgias and back pain.     Past Medical History:   Diagnosis Date    Diabetes mellitus, type 2     Hyperlipidemia     Testosterone deficiency     LOW Testosterone          Past Surgical History:   Procedure Laterality Date    EPIDURAL STEROID INJECTION N/A 2/2/2023    Procedure: Lumbar L5/S1 IL JONATNA with right paramedian approach RN IV Sedation;  Surgeon: Nico Angeles MD;  Location: [facility];  Service: Pain Management;  Laterality: N/A;    SELECTIVE INJECTION OF ANESTHETIC AGENT AROUND LUMBAR SPINAL NERVE ROOT BY TRANSFORAMINAL APPROACH Bilateral 4/4/2023    Procedure: Bilateral L5/S1 TF JONATAN;  Surgeon: Nico Angeles MD;  Location: [facility];  Service: Pain Management;  Laterality: Bilateral;     Family History   Problem Relation Age of Onset    Diabetes type II Mother     Diabetes type II Father     Hypertension Father     Diabetes type II Brother     Cancer Maternal Grandmother     Diabetes Maternal Grandfather     No Known Problems Paternal Grandmother     Heart attack Paternal Grandfather      Social History     Socioeconomic History    Marital status:    Tobacco Use    Smoking status: Some Days     Types: Vaping with nicotine     "Smokeless tobacco: Never   Substance and Sexual Activity    Alcohol use: Yes     Alcohol/week: 1.0 standard drink of alcohol     Types: 1 Shots of liquor per week     Comment: once a month    Drug use: Never    Sexual activity: Yes     Partners: Male     Review of patient's allergies indicates:  No Known Allergies    Objective:       /72 (BP Location: Right arm, Patient Position: Sitting, BP Method: Large (Manual))   Pulse 88   Temp 97.7 °F (36.5 °C) (Tympanic)   Ht 5' 11" (1.803 m)   Wt 113.2 kg (249 lb 9 oz)   SpO2 97%   BMI 34.81 kg/m²   Physical Exam  Constitutional:       General: He is in acute distress.      Appearance: Normal appearance. He is well-developed. He is not ill-appearing or diaphoretic.   Cardiovascular:      Rate and Rhythm: Normal rate and regular rhythm.      Heart sounds: Normal heart sounds.   Pulmonary:      Effort: Pulmonary effort is normal.      Breath sounds: Normal breath sounds.   Neurological:      General: No focal deficit present.      Mental Status: He is alert and oriented to person, place, and time.   Psychiatric:         Mood and Affect: Mood normal.         Behavior: Behavior normal.         Thought Content: Thought content normal.         Judgment: Judgment normal.       Assessment:     1. Type 2 diabetes mellitus without complication, without long-term current use of insulin    2. Lumbar radiculopathy      Plan:   Type 2 diabetes mellitus without complication, without long-term current use of insulin  -     semaglutide (OZEMPIC) 1 mg/dose (4 mg/3 mL); Inject 1 mg into the skin every 7 days.  Dispense: 3 mL; Refill: 11  -     Hemoglobin A1C; Future; Expected date: 05/29/2024  -     Comprehensive Metabolic Panel; Future; Expected date: 05/29/2024  -     Lipid Panel; Future; Expected date: 05/29/2024  -     CBC Auto Differential; Future; Expected date: 05/29/2024  -     TSH; Future; Expected date: 05/29/2024    Lumbar radiculopathy    Other orders  -     celecoxib " (CELEBREX) 200 MG capsule; Take 1 capsule (200 mg total) by mouth 2 (two) times daily.  Dispense: 60 capsule; Refill: 1  -     pregabalin (LYRICA) 300 MG Cap; Take 1 capsule (300 mg total) by mouth 2 (two) times daily.  Dispense: 60 capsule; Refill: 0    Continue all other current medications.   Above labs in 6 months prior to visit with me.    Medication List with Changes/Refills   New Medications    CELECOXIB (CELEBREX) 200 MG CAPSULE    Take 1 capsule (200 mg total) by mouth 2 (two) times daily.    PREGABALIN (LYRICA) 300 MG CAP    Take 1 capsule (300 mg total) by mouth 2 (two) times daily.    SEMAGLUTIDE (OZEMPIC) 1 MG/DOSE (4 MG/3 ML)    Inject 1 mg into the skin every 7 days.   Current Medications    CYCLOBENZAPRINE (FLEXERIL) 10 MG TABLET    Take 1 tablet (10 mg total) by mouth every evening.    GABAPENTIN (NEURONTIN) 300 MG CAPSULE    Take 3 capsules (900 mg total) by mouth 2 (two) times daily.    HYDROCODONE-ACETAMINOPHEN (NORCO) 5-325 MG PER TABLET    Take 1 tablet by mouth every 6 (six) hours as needed for Pain.    METFORMIN (GLUCOPHAGE-XR) 750 MG ER 24HR TABLET    Take two tablets with breakfast and one in the evenings.    ROSUVASTATIN (CRESTOR) 20 MG TABLET    Take 1 tablet (20 mg total) by mouth once daily.   Discontinued Medications    CELECOXIB (CELEBREX) 100 MG CAPSULE    Take 1 capsule (100 mg total) by mouth 2 (two) times daily as needed for Pain.    EMPAGLIFLOZIN (JARDIANCE) 25 MG TABLET    Take 1 tablet (25 mg total) by mouth once daily.    OZEMPIC 0.25 MG OR 0.5 MG (2 MG/3 ML) PEN INJECTOR    INJECT 0.5 MG UNDER THE SKIN EVERY 7 DAYS AS DIRECTED

## 2023-12-07 ENCOUNTER — HOSPITAL ENCOUNTER (OUTPATIENT)
Facility: HOSPITAL | Age: 48
Discharge: HOME | End: 2023-12-07
Attending: PHYSICAL MEDICINE & REHABILITATION | Admitting: PHYSICAL MEDICINE & REHABILITATION
Payer: COMMERCIAL

## 2023-12-07 VITALS
SYSTOLIC BLOOD PRESSURE: 121 MMHG | TEMPERATURE: 98 F | RESPIRATION RATE: 18 BRPM | OXYGEN SATURATION: 96 % | WEIGHT: 251.13 LBS | HEIGHT: 71 IN | HEART RATE: 94 BPM | DIASTOLIC BLOOD PRESSURE: 79 MMHG | BODY MASS INDEX: 35.16 KG/M2

## 2023-12-07 DIAGNOSIS — M54.16 LUMBAR RADICULOPATHY: Primary | ICD-10-CM

## 2023-12-07 LAB — POCT GLUCOSE: 127 MG/DL (ref 70–110)

## 2023-12-07 PROCEDURE — 25500020 PHARM REV CODE 255: Performed by: PHYSICAL MEDICINE & REHABILITATION

## 2023-12-07 PROCEDURE — 63600175 PHARM REV CODE 636 W HCPCS: Performed by: PHYSICAL MEDICINE & REHABILITATION

## 2023-12-07 PROCEDURE — 64483 PR EPIDURAL INJ, ANES/STEROID, TRANSFORAMINAL, LUMB/SACR, SNGL LEVL: ICD-10-PCS | Mod: 50,,, | Performed by: PHYSICAL MEDICINE & REHABILITATION

## 2023-12-07 PROCEDURE — 64483 NJX AA&/STRD TFRM EPI L/S 1: CPT | Mod: 50,,, | Performed by: PHYSICAL MEDICINE & REHABILITATION

## 2023-12-07 PROCEDURE — 82962 GLUCOSE BLOOD TEST: CPT | Performed by: PHYSICAL MEDICINE & REHABILITATION

## 2023-12-07 PROCEDURE — 64483 NJX AA&/STRD TFRM EPI L/S 1: CPT | Mod: 50 | Performed by: PHYSICAL MEDICINE & REHABILITATION

## 2023-12-07 RX ORDER — FENTANYL CITRATE 50 UG/ML
INJECTION, SOLUTION INTRAMUSCULAR; INTRAVENOUS
Status: DISCONTINUED | OUTPATIENT
Start: 2023-12-07 | End: 2023-12-07 | Stop reason: HOSPADM

## 2023-12-07 RX ORDER — MIDAZOLAM HYDROCHLORIDE 1 MG/ML
INJECTION, SOLUTION INTRAMUSCULAR; INTRAVENOUS
Status: DISCONTINUED | OUTPATIENT
Start: 2023-12-07 | End: 2023-12-07 | Stop reason: HOSPADM

## 2023-12-07 RX ORDER — METHYLPREDNISOLONE ACETATE 40 MG/ML
INJECTION, SUSPENSION INTRA-ARTICULAR; INTRALESIONAL; INTRAMUSCULAR; SOFT TISSUE
Status: DISCONTINUED | OUTPATIENT
Start: 2023-12-07 | End: 2023-12-07 | Stop reason: HOSPADM

## 2023-12-07 RX ORDER — ONDANSETRON 2 MG/ML
4 INJECTION INTRAMUSCULAR; INTRAVENOUS ONCE AS NEEDED
Status: COMPLETED | OUTPATIENT
Start: 2023-12-07 | End: 2023-12-07

## 2023-12-07 RX ORDER — BUPIVACAINE HYDROCHLORIDE 2.5 MG/ML
INJECTION, SOLUTION EPIDURAL; INFILTRATION; INTRACAUDAL
Status: DISCONTINUED | OUTPATIENT
Start: 2023-12-07 | End: 2023-12-07 | Stop reason: HOSPADM

## 2023-12-07 RX ADMIN — ONDANSETRON 4 MG: 2 INJECTION INTRAMUSCULAR; INTRAVENOUS at 07:12

## 2023-12-07 NOTE — DISCHARGE SUMMARY
Discharge Note  Short Stay      SUMMARY     Admit Date: 12/7/2023    Attending Physician: Nico Angeles MD        Discharge Physician: Nico Angeles MD        Discharge Date: 12/7/2023 7:56 AM    Procedure(s) (LRB):  Bilateral L5/S1 TF JONATAN (Bilateral)    Final Diagnosis: Lumbar radiculopathy [M54.16]    Disposition: Home or self care    Patient Instructions:   Current Discharge Medication List        CONTINUE these medications which have NOT CHANGED    Details   celecoxib (CELEBREX) 200 MG capsule Take 1 capsule (200 mg total) by mouth 2 (two) times daily.  Qty: 60 capsule, Refills: 1      cyclobenzaprine (FLEXERIL) 10 MG tablet Take 1 tablet (10 mg total) by mouth every evening.  Qty: 30 tablet, Refills: 1    Associated Diagnoses: Lumbar radiculopathy; Chronic bilateral low back pain without sciatica      HYDROcodone-acetaminophen (NORCO) 5-325 mg per tablet Take 1 tablet by mouth every 6 (six) hours as needed for Pain.  Qty: 21 tablet, Refills: 0    Comments: Quantity prescribed more than 7 day supply? No  Associated Diagnoses: Lumbar radiculopathy; Lumbar spondylosis      metFORMIN (GLUCOPHAGE-XR) 750 MG ER 24hr tablet Take two tablets with breakfast and one in the evenings.  Qty: 90 tablet, Refills: 11      pregabalin (LYRICA) 300 MG Cap Take 1 capsule (300 mg total) by mouth 2 (two) times daily.  Qty: 60 capsule, Refills: 0      rosuvastatin (CRESTOR) 20 MG tablet Take 1 tablet (20 mg total) by mouth once daily.  Qty: 90 tablet, Refills: 3      semaglutide (OZEMPIC) 1 mg/dose (4 mg/3 mL) Inject 1 mg into the skin every 7 days.  Qty: 3 mL, Refills: 11    Associated Diagnoses: Type 2 diabetes mellitus without complication, without long-term current use of insulin      gabapentin (NEURONTIN) 300 MG capsule Take 3 capsules (900 mg total) by mouth 2 (two) times daily.  Qty: 180 capsule, Refills: 2    Associated Diagnoses: Lumbar radiculopathy; Chronic bilateral low back pain without sciatica                  Discharge Diagnosis: Lumbar radiculopathy [M54.16]  Condition on Discharge: Stable with no complications to procedure   Diet on Discharge: Same as before.  Activity: as per instruction sheet.  Discharge to: Home with a responsible adult.  Follow up: 2-4 weeks       Please call the office at [phone] if you experience any weakness or loss of sensation, fever > 101.5, pain uncontrolled with oral medications, persistent nausea/vomiting/or diarrhea, redness or drainage from the incisions, or any other worrisome concerns. If physician on call was not reached or could not communicate with our office for any reason please go to the nearest emergency department

## 2023-12-07 NOTE — OP NOTE
INFORMED CONSENT: The procedure, risks, benefits and options were discussed with patient. There are no contraindications to the procedure. The patient expressed understanding and agreed to proceed. The personnel performing the procedure was discussed.    12/07/2023    Surgeon: Nico Angeles MD    Assistants: None    Sedation: Conscious sedation provided by M.D    The patient was monitored with continuous pulse oximetry, EKG, and intermittent blood pressure monitors, immediately prior to administration of sedation.  The patient was hemodynamically stable throughout the entire process was responsive to voice, and breathing spontaneously.  Supplemental O2 was provided at 2L/min via nasal cannula.  Patient was comfortable for the duration of the procedure.     There was a total of 2mg IV Midazolam and 75mcg Fentanyl titrated for the procedure    Total sedation time was >10minutes and <20minutes      PROCEDURE:  Bilateral  L5/S1  1) Left  L5/S1 TRANSFORAMINAL EPIDURAL STEROID INJECTION  2) Right  L5/S1 TRANSFORAMINAL EPIDURAL STEROID INJECTION      Pre Procedure diagnosis:  Bilateral L5/S1 Lumbar radiculopathy [M54.16]    Post-Procedure diagnosis:   same    Complications: None    Specimens: None      DESCRIPTION OF PROCEDURE: The patient was brought to the procedure room. IV access was obtained prior to the procedure. The patient was positioned prone on the fluoroscopy table. Continuous hemodynamic monitoring was initiated including blood pressure, EKG, and pulse oximetry. . The skin was prepped with chlorhexidine and draped in a sterile fashion. Skin anesthesia was achieved using a total of 10mL of lidocaine, 5mL over each respective injection site.     The  L5/S1 transforaminal spaces were identified with fluoroscopy in the  AP, oblique, and lateral views.  A 22 gauge spinal quinke needle was then advanced into the area of the trans foraminal spaces bilaterally with confirmation of proper needle position using AP,  oblique, and lateral fluoroscopic views. Once the needle tip was in the area of the transforaminal space, and there was no blood, CSF or paraesthesias,  1.5 mL of Omnipaque 300mg/ml was injected on each side for a total of 3mL.  Fluoroscopic imaging in the AP and lateral views revealed a clear outline of the spinal nerve with proximal spread of agent through the neural foramen into the epidural space. A total combination of 1 mL of Bupivicaine 0.25% and 40 mg depo medrol was injected on each side for a total of 4mL of injected medications with displacement of the contrast dye confirming that the medication went into the area of the transforaminal spaces bilaterally. A sterile dressing was applied.   Patient tolerated the procedure well.    Patient was taken back to the recovery room for further observation.     The patient was discharged to home in stable condition

## 2023-12-07 NOTE — DISCHARGE INSTRUCTIONS

## 2023-12-11 DIAGNOSIS — M47.816 LUMBAR SPONDYLOSIS: ICD-10-CM

## 2023-12-11 DIAGNOSIS — M54.16 LUMBAR RADICULOPATHY: ICD-10-CM

## 2023-12-11 RX ORDER — HYDROCODONE BITARTRATE AND ACETAMINOPHEN 5; 325 MG/1; MG/1
1 TABLET ORAL EVERY 6 HOURS PRN
Qty: 21 TABLET | Refills: 0 | Status: SHIPPED | OUTPATIENT
Start: 2023-12-11 | End: 2023-12-30 | Stop reason: SDUPTHER

## 2023-12-11 NOTE — TELEPHONE ENCOUNTER
Can you refill?HYDROcodone-acetaminophen (NORCO) 5-325 mg per tablet     Last Visit:11/13/2023  Next Visit:01/24/2024  Last refill:11/27/2023  How pt patient is currently taking medication requested: Sig - Route: Take 1 tablet by mouth every 6 (six) hours as needed for Pain. - Oral   Pharmacy:   [id number] - [place], [place] - [address] AT [facility] OF [address]

## 2023-12-15 ENCOUNTER — CLINICAL SUPPORT (OUTPATIENT)
Dept: INTERNAL MEDICINE | Facility: CLINIC | Age: 48
End: 2023-12-15
Payer: COMMERCIAL

## 2023-12-15 DIAGNOSIS — E34.9 TESTOSTERONE DEFICIENCY: Primary | ICD-10-CM

## 2023-12-15 PROCEDURE — 96372 PR INJECTION,THERAP/PROPH/DIAG2ST, IM OR SUBCUT: ICD-10-PCS | Mod: S$GLB,,, | Performed by: FAMILY MEDICINE

## 2023-12-15 PROCEDURE — 96372 THER/PROPH/DIAG INJ SC/IM: CPT | Mod: S$GLB,,, | Performed by: FAMILY MEDICINE

## 2023-12-15 RX ADMIN — TESTOSTERONE CYPIONATE 400 MG: 200 INJECTION, SOLUTION INTRAMUSCULAR at 11:12

## 2023-12-15 NOTE — PROGRESS NOTES
Pt requested injection be given on right side of hip due to a procedure done on in left hip  Depotestosterone 400 mg ( 2 ml ) given IM in right ventral gluteus  Lot # : [id number]    Exp: 04/30/25  NDC #: 44969-768-45  Manufactory: [employer]    Pt waited 15 minutes without a reaction notices

## 2023-12-30 DIAGNOSIS — M54.16 LUMBAR RADICULOPATHY: ICD-10-CM

## 2023-12-30 DIAGNOSIS — E11.9 TYPE 2 DIABETES MELLITUS WITHOUT COMPLICATION, WITHOUT LONG-TERM CURRENT USE OF INSULIN: ICD-10-CM

## 2023-12-30 DIAGNOSIS — M47.816 LUMBAR SPONDYLOSIS: ICD-10-CM

## 2023-12-30 DIAGNOSIS — G89.29 CHRONIC BILATERAL LOW BACK PAIN WITHOUT SCIATICA: ICD-10-CM

## 2023-12-30 DIAGNOSIS — M54.50 CHRONIC BILATERAL LOW BACK PAIN WITHOUT SCIATICA: ICD-10-CM

## 2023-12-30 NOTE — TELEPHONE ENCOUNTER
Care Due:                  Date            Visit Type   Department     Provider  --------------------------------------------------------------------------------                                EP -                              PRIMARY      CEN INTERNAL  Last Visit: 12-      CARE ([facility])   PAMELA Mclaughlin                              EP -                              PRIMARY      CEN INTERNAL  Next Visit: 06-      CARE ([facility])   PAMELA Mclaughlin                                                            Last  Test          Frequency    Reason                     Performed    Due Date  --------------------------------------------------------------------------------    CBC.........  12 months..  celecoxib................  02- 02-    [facility] Embedded Care Due Messages. Reference number: [id number].   12/30/2023 4:07:11 PM CST

## 2023-12-30 NOTE — TELEPHONE ENCOUNTER
No care due was identified.  [facility] Embedded Care Due Messages. Reference number: [id number].   12/30/2023 4:07:55 PM CST

## 2023-12-31 NOTE — TELEPHONE ENCOUNTER
No care due was identified.  [facility] Embedded Care Due Messages. Reference number: [id number].   12/31/2023 3:48:05 AM CST

## 2024-01-02 ENCOUNTER — TELEPHONE (OUTPATIENT)
Dept: INTERNAL MEDICINE | Facility: CLINIC | Age: 49
End: 2024-01-02
Payer: COMMERCIAL

## 2024-01-02 RX ORDER — GABAPENTIN 300 MG/1
900 CAPSULE ORAL 2 TIMES DAILY
Qty: 180 CAPSULE | Refills: 2 | Status: SHIPPED | OUTPATIENT
Start: 2024-01-02 | End: 2024-01-21 | Stop reason: SDUPTHER

## 2024-01-02 RX ORDER — METFORMIN HYDROCHLORIDE 750 MG/1
TABLET, EXTENDED RELEASE ORAL
Qty: 60 TABLET | OUTPATIENT
Start: 2024-01-02

## 2024-01-02 RX ORDER — HYDROCODONE BITARTRATE AND ACETAMINOPHEN 5; 325 MG/1; MG/1
1 TABLET ORAL EVERY 6 HOURS PRN
Qty: 21 TABLET | Refills: 0 | Status: SHIPPED | OUTPATIENT
Start: 2024-01-02 | End: 2024-01-21 | Stop reason: SDUPTHER

## 2024-01-02 NOTE — TELEPHONE ENCOUNTER
Pt needs new DepoTestosterone order due to the other one being out of range for use. Pt was due on 12/30 but is now coming on Thursday the 4th.

## 2024-01-02 NOTE — TELEPHONE ENCOUNTER
Requested Prescriptions     Pending Prescriptions Disp Refills    semaglutide (OZEMPIC) 1 mg/dose (4 mg/3 mL) 3 mL 11     Sig: Inject 1 mg into the skin every 7 days.     LV 12/01/2023   NV 06/07/2024  LF 12/01/2023

## 2024-01-02 NOTE — TELEPHONE ENCOUNTER
Refill Decision Note   Kendrick Brandy  is requesting a refill authorization.  Brief Assessment and Rationale for Refill:  Quick Discontinue     Medication Therapy Plan:  Patient is taking 2 tablets(1500 mg) with breakfast and 1 tablet(750 mg) in the evening; C      Comments:     Note composed:5:17 AM 01/02/2024

## 2024-01-02 NOTE — TELEPHONE ENCOUNTER
Requested Prescriptions     Pending Prescriptions Disp Refills    pregabalin (LYRICA) 300 MG Cap 60 capsule 0     Sig: Take 1 capsule (300 mg total) by mouth 2 (two) times daily.     Last Visit: 12-        Next Visit: 06-        Last filled. 12/01/2023

## 2024-01-03 RX ORDER — TESTOSTERONE CYPIONATE 200 MG/ML
400 INJECTION, SOLUTION INTRAMUSCULAR
Status: DISCONTINUED | OUTPATIENT
Start: 2024-01-04 | End: 2024-02-23

## 2024-01-03 RX ORDER — PREGABALIN 300 MG/1
300 CAPSULE ORAL 2 TIMES DAILY
Qty: 60 CAPSULE | Refills: 3 | Status: SHIPPED | OUTPATIENT
Start: 2024-01-03 | End: 2024-01-24

## 2024-01-03 RX ORDER — SEMAGLUTIDE 1.34 MG/ML
1 INJECTION, SOLUTION SUBCUTANEOUS
Qty: 3 ML | Refills: 11 | Status: SHIPPED | OUTPATIENT
Start: 2024-01-03 | End: 2025-01-02

## 2024-01-04 ENCOUNTER — CLINICAL SUPPORT (OUTPATIENT)
Dept: INTERNAL MEDICINE | Facility: CLINIC | Age: 49
End: 2024-01-04
Payer: COMMERCIAL

## 2024-01-04 DIAGNOSIS — E34.9 TESTOSTERONE DEFICIENCY: Primary | ICD-10-CM

## 2024-01-04 PROCEDURE — 99999 PR PBB SHADOW E&M-EST. PATIENT-LVL II: CPT | Mod: PBBFAC,,,

## 2024-01-04 PROCEDURE — 96372 THER/PROPH/DIAG INJ SC/IM: CPT | Mod: S$GLB,,, | Performed by: FAMILY MEDICINE

## 2024-01-04 RX ADMIN — TESTOSTERONE CYPIONATE 400 MG: 200 INJECTION, SOLUTION INTRAMUSCULAR at 03:01

## 2024-01-04 NOTE — PROGRESS NOTES
Depotestosterone 400 mg given IM in left ventral gluteus   Lot #: [id number]    Exp: 068/30/25    Manufactory: [employer]  NDC #: 27458-210-91      Pt waited 15 minutes without a reaction notices

## 2024-01-17 ENCOUNTER — PATIENT MESSAGE (OUTPATIENT)
Dept: INTERNAL MEDICINE | Facility: CLINIC | Age: 49
End: 2024-01-17

## 2024-01-17 ENCOUNTER — CLINICAL SUPPORT (OUTPATIENT)
Dept: INTERNAL MEDICINE | Facility: CLINIC | Age: 49
End: 2024-01-17
Payer: COMMERCIAL

## 2024-01-17 DIAGNOSIS — E34.9 TESTOSTERONE DEFICIENCY: Primary | ICD-10-CM

## 2024-01-17 PROCEDURE — 99999 PR PBB SHADOW E&M-EST. PATIENT-LVL II: CPT | Mod: PBBFAC,,,

## 2024-01-17 PROCEDURE — 96372 THER/PROPH/DIAG INJ SC/IM: CPT | Mod: S$GLB,,, | Performed by: FAMILY MEDICINE

## 2024-01-17 RX ADMIN — TESTOSTERONE CYPIONATE 400 MG: 200 INJECTION, SOLUTION INTRAMUSCULAR at 09:01

## 2024-01-17 NOTE — PROGRESS NOTES
Pt came into clinic for DepoTestosterone 400mg as ordered by . Pt instructed to wait 15 minutes following injection for possible reaction. Pt verbalized understanding and tolerated injection well. Next visit scheduled.     Lot #[id number]  Expires 04/2025

## 2024-01-21 DIAGNOSIS — M54.16 LUMBAR RADICULOPATHY: ICD-10-CM

## 2024-01-21 DIAGNOSIS — M47.816 LUMBAR SPONDYLOSIS: ICD-10-CM

## 2024-01-21 DIAGNOSIS — G89.29 CHRONIC BILATERAL LOW BACK PAIN WITHOUT SCIATICA: ICD-10-CM

## 2024-01-21 DIAGNOSIS — M54.50 CHRONIC BILATERAL LOW BACK PAIN WITHOUT SCIATICA: ICD-10-CM

## 2024-01-22 RX ORDER — HYDROCODONE BITARTRATE AND ACETAMINOPHEN 5; 325 MG/1; MG/1
1 TABLET ORAL EVERY 6 HOURS PRN
Qty: 21 TABLET | Refills: 0 | Status: SHIPPED | OUTPATIENT
Start: 2024-01-22 | End: 2024-02-03 | Stop reason: SDUPTHER

## 2024-01-22 RX ORDER — GABAPENTIN 300 MG/1
900 CAPSULE ORAL 2 TIMES DAILY
Qty: 180 CAPSULE | Refills: 2 | Status: SHIPPED | OUTPATIENT
Start: 2024-01-22 | End: 2024-01-24 | Stop reason: DRUGHIGH

## 2024-01-22 NOTE — TELEPHONE ENCOUNTER
Good Morning/Afternoon,     Pt is requesting for a refill of: Gabapentin 300 mg   Last filed: 1/2/24  Last encounter: 11/13/23  Up coming apt: 1/24/24  Pharmacy: [facility] #[id number]  Is this something you can do?      Mya Gutierrez  Medical Assistant     Good Morning/Afternoon,     Pt is requesting for a refill of: Hydrocodone-acetaminophen 5-325mg  Last filed: 1/2/24  Last encounter: 11/13/23  Up coming apt: 1/24/24  Pharmacy: [facility] #[id number]  Is this something you can do?      Mya Johnson

## 2024-01-22 NOTE — TELEPHONE ENCOUNTER
Good Morning/Afternoon,     Pt is requesting for a refill of: gabapentin 300 mg  Last filed: 1/2/24  Last encounter: 11/13/23  Up coming apt: 1/24/24  Pharmacy: [facility] #[id number]  Is this something you can do?      Mya Gutierrez  Medical Assistant

## 2024-01-24 ENCOUNTER — OFFICE VISIT (OUTPATIENT)
Dept: PAIN MEDICINE | Facility: CLINIC | Age: 49
End: 2024-01-24
Payer: COMMERCIAL

## 2024-01-24 VITALS
BODY MASS INDEX: 33.75 KG/M2 | HEIGHT: 71 IN | SYSTOLIC BLOOD PRESSURE: 113 MMHG | DIASTOLIC BLOOD PRESSURE: 72 MMHG | WEIGHT: 241.06 LBS | HEART RATE: 97 BPM

## 2024-01-24 DIAGNOSIS — M51.36 DDD (DEGENERATIVE DISC DISEASE), LUMBAR: ICD-10-CM

## 2024-01-24 DIAGNOSIS — M54.16 LUMBAR RADICULOPATHY: Primary | ICD-10-CM

## 2024-01-24 DIAGNOSIS — M47.816 LUMBAR SPONDYLOSIS: ICD-10-CM

## 2024-01-24 DIAGNOSIS — G89.29 CHRONIC BILATERAL LOW BACK PAIN WITHOUT SCIATICA: ICD-10-CM

## 2024-01-24 DIAGNOSIS — M21.372 FOOT DROP, LEFT: ICD-10-CM

## 2024-01-24 DIAGNOSIS — M54.50 CHRONIC BILATERAL LOW BACK PAIN WITHOUT SCIATICA: ICD-10-CM

## 2024-01-24 DIAGNOSIS — M48.062 SPINAL STENOSIS OF LUMBAR REGION WITH NEUROGENIC CLAUDICATION: ICD-10-CM

## 2024-01-24 PROCEDURE — 1159F MED LIST DOCD IN RCRD: CPT | Mod: CPTII,S$GLB,, | Performed by: PHYSICAL MEDICINE & REHABILITATION

## 2024-01-24 PROCEDURE — 3078F DIAST BP <80 MM HG: CPT | Mod: CPTII,S$GLB,, | Performed by: PHYSICAL MEDICINE & REHABILITATION

## 2024-01-24 PROCEDURE — 99999 PR PBB SHADOW E&M-EST. PATIENT-LVL III: CPT | Mod: PBBFAC,,, | Performed by: PHYSICAL MEDICINE & REHABILITATION

## 2024-01-24 PROCEDURE — 99214 OFFICE O/P EST MOD 30 MIN: CPT | Mod: S$GLB,,, | Performed by: PHYSICAL MEDICINE & REHABILITATION

## 2024-01-24 PROCEDURE — 3008F BODY MASS INDEX DOCD: CPT | Mod: CPTII,S$GLB,, | Performed by: PHYSICAL MEDICINE & REHABILITATION

## 2024-01-24 PROCEDURE — 3074F SYST BP LT 130 MM HG: CPT | Mod: CPTII,S$GLB,, | Performed by: PHYSICAL MEDICINE & REHABILITATION

## 2024-01-24 RX ORDER — EMPAGLIFLOZIN 25 MG/1
25 TABLET, FILM COATED ORAL
COMMUNITY
Start: 2024-01-02 | End: 2024-06-10

## 2024-01-24 RX ORDER — GABAPENTIN 600 MG/1
1200 TABLET ORAL 2 TIMES DAILY
Qty: 120 TABLET | Refills: 11 | Status: SHIPPED | OUTPATIENT
Start: 2024-01-24 | End: 2025-01-23

## 2024-01-24 NOTE — PROGRESS NOTES
Est Patient Chronic Pain Note (Follow up Visit)      Chief Complaint:   Chief Complaint   Patient presents with    Low-back Pain    Leg Pain        SUBJECTIVE:    Kendrick Nava is a 48 y.o. male presents today for persistent lower back and leg pain.  He continues to have numbness and tingling in his left lower extremity with weakness with foot dorsiflexion..  He is s/p bilateral L5-S1 TFESI that occurred on 12/07/2023.  He reports that this resulted in 70% relief that is waning in relief.  Current pain intensity is 4/10.  He is seeing outside neurosurgeon who discussed potential lumbar spine surgery, but ultimately left it up to the patient for which the patient was unsure of what he would like to do at this time.        Patient denies night fever/night sweats, urinary incontinence, bowel incontinence, significant weight loss, significant motor weakness, and loss of sensations.    Pain Disability Index Review:      1/24/2024    11:42 AM 11/13/2023     8:57 AM 10/18/2023     9:09 AM   Last 3 PDI Scores   Pain Disability Index (PDI) 38 [phone]     Interval HPI 11/13/2023:  Kendrick Nava is a 48 y.o. male presents today for follow-up chronic lower back pain.   He reports that he has had severe worsening lower back pain and left lower extremity pain with weakness.  He reports that he had a fall off of a ladder due to this weakness, but denies any injuries prior to the initiation of this pain flare.  Current pain intensity is 6/10, but states it can increase to 10/10 at its worst.     Interval HPI 10/18/2023:  Kendrick Nava is a 47 y.o. male presents today for follow-up chronic lower back pain.  Current pain intensity is 4/10.  Reports that his pain is stable with use of gabapentin, Celebrex, Flexeril and Norco p.r.n..  He denies any adverse reactions to these medications he reports his pain is well managed with these medications.  He is able to complete his daily task and labor intensive job without any  significant issues.    Interval History (7/18/2023):  Kendrick Nava presents today for follow-up visit.  Patient was last seen on 5/2/2023. He reports his leg pain is well-controlled with Gabapentin, he takes 900 mg in the morning and 900 mg at night. Continues to take Celebrex daily and Flexeril nightly with good relief, Norco sparingly for severe. Pain. Reports right sided pain in his lower back that wraps around into his stomach x 3 weeks. Patient reports pain as 4/10 today.    Interval History (5/1/2023): Kendrick Nava presents today for follow-up visit.  he underwent bilateral L5/S1 TF JONATAN on 4/4/23.  The patient reports that he is/was better following the procedure.  he reports initially experienced worsening pain for about a week after the procedure, followed by  95% pain relief x 1 week before relief dwindled to 20%. Reports continued pressure in his lower back and pain across the lumbosacral region in a band like distribution with radiation into his right lower extremity along L5/S1 distribution. Takes Gabapentin and Flexeril nightly with good relief, no side effects. Norco for severe pain.  Patient reports pain as 6/10 today.    Interval HPI 03/13/2023  Kendrick Nava is a 47 y.o. male presents today for injection follow-up.  He was last seen for L5-S1 IL JONATAN on 02/02/2023.  He reports that this resulted in 80+ % relief for the initial 2 weeks, but now has decreased to about 50-60% relief.  He feels that the pain is of the same type and quality and is in the same location..    Interval History (1/13/2023):  Kendrick Nava presents today for follow-up visit.  Patient was last seen on Visit date not found. Patient reports pain as 5/10 today.  He has been performing physician directed exercises targeting sciatic nerve pain for several weeks without improvement. He reports continued pain in his thoracolumbar region but his worst pain is persistently along his lumbosacral region in a band-like  distribution with radiation into his right lower extremity along L5/S1 distribution. Reports occasional radiation into his left leg. He installs italo for a living and reports prolonged bending and stooping worsens his symptoms. He reports by the end of the day he walks with a limp.      Initial HPI 08/29/2022  Kendrick Nava is a 47 y.o. male who presents to the clinic for the evaluation of back pain.  He was referred by his primary care provider for further evaluation and management of this pain.  The pain started a few years ago following a fall from a ladder in 2018 and symptoms have been unchanged.The pain is located in the right thoracolumbar area and radiates to the lumbosacral region with occasional radiation into the left lower extremity extending to the foot and ankle.  The pain is described as aching, numbness, tingling burning and is rated as 6/10. The pain is rated with a score of  4/10 on the BEST day and a score of 8/10 on the WORST day.  Symptoms interfere with daily activity. The pain is exacerbated by work activities, prolonged standing or sitting.  The pain is mitigated by activity modification.       Non-Pharmacologic Treatments:  Physical Therapy/Home Exercise: yes  Ice/Heat:yes  TENS: no  Acupuncture: no  Massage: yes  Chiropractic: yes    Other: no      Pain Medications:  - Opioids:  Tramadol  - Adjuvant Medications:  Mobic  - Anti-Coagulants:  None     report:  Reviewed and consistent with medication use as prescribed.      Pain Procedures:   -12/07/2023:  Bilateral L5-S1 TFESI, 70% relief overall  -02/02/2023:  L5-S1 IL JONATAN, 80% relief for the initial 2 weeks with continued 50-60% relief  - bilateral L5/S1 TF JONATAN on 4/4/23 with 95% relief x 1 week, then 20% relief    Imaging:   Outside CT and MRI of the thoracic lumbar spine reviewed 2018    MRI lumbar spine 11/17/2023:  There are 5 non-rib-bearing lumbar vertebrae.  Alignment is unremarkable with no significant listhesis.  No acute  fracture or compression deformity.  No aggressive focal signal abnormality.     Conus medullaris terminates at the L1 level.  Conus medullaris is normal in size and signal.     T12-L1: Mild disc desiccation and trace bulge.  No significant spinal canal or neural foraminal stenosis.     L1-L2: Mild disc desiccation.  No significant spinal canal or neural foraminal stenosis.     L2-L3: Mild disc desiccation and trace bulge.  No significant spinal canal or neural foraminal stenosis.     L3-L4: Disc desiccation and small bulge.  Mild bilateral facet arthropathy.  No significant spinal canal stenosis.  Mild bilateral neural foraminal stenosis.     L4-L5: Disc desiccation and asymmetric to the left disc bulge.  Mild bilateral facet arthropathy.  No significant spinal canal stenosis.  Moderate left and mild right neural foraminal stenosis.     L5-S1: Disc desiccation and asymmetric to the right disc bulge.  Mild bilateral facet arthropathy.  No significant spinal canal stenosis although there is encroachment on the right lateral recess and descending right S1 nerve root.  No significant neural foraminal stenosis.     Paraspinal soft tissues are unremarkable.  Visualized intra-abdominal and pelvic contents are also unremarkable.    CT lumbar spine 12/02/2022     FINDINGS:  No acute fracture or dislocation.  Moderate disc height loss T11-12 and T12-L1.  Mild disc height loss at L1-2 through L4-5.  Mild facet arthropathy.  No significant endplate sclerosis.  Mild Schmorl's node formation T12-L1 and L1-2.     SI joints unremarkable.  Mild aortoiliac atherosclerotic calcification.     Impression:     No acute abnormality identified.  Chronic mild discogenic degenerative change as described above    CT lumbar spine 07/22/2018  1.  Acute, traumatic fractures involving the left L2, left L3, and left L4 transverse processes.     2.  Multilevel lumbar degenerative disc disease, spondylosis, and stenoses, as discussed above. This is  probably most prominent at L5-S1 on the right, with there is mass effect on and posterior displacement of the right S1 nerve root within the lateral recess, with right lateral recess stenosis.    X-ray lumbar spine 12/02/2021:  The vertebral bodies of the lumbar spine demonstrate a normal height and alignment.  There appears to be mild chronic vertebral body height loss at the T11 and T12 levels with some spondylosis noted at these levels.  The disc space heights appear to be relatively well maintained.  Mild spondylosis noted anteriorly at the L3-4 and to a lesser degree the L4-5 levels.  No pars defects are noted.       Past Medical History:   Diagnosis Date    Diabetes mellitus, type 2     Hyperlipidemia     Testosterone deficiency     LOW Testosterone     Past Surgical History:   Procedure Laterality Date    EPIDURAL STEROID INJECTION N/A 2/2/2023    Procedure: Lumbar L5/S1 IL JONATAN with right paramedian approach RN IV Sedation;  Surgeon: Nico Angeles MD;  Location: [facility];  Service: Pain Management;  Laterality: N/A;    SELECTIVE INJECTION OF ANESTHETIC AGENT AROUND LUMBAR SPINAL NERVE ROOT BY TRANSFORAMINAL APPROACH Bilateral 4/4/2023    Procedure: Bilateral L5/S1 TF JONATAN;  Surgeon: Nico Angeles MD;  Location: [facility];  Service: Pain Management;  Laterality: Bilateral;    SELECTIVE INJECTION OF ANESTHETIC AGENT AROUND LUMBAR SPINAL NERVE ROOT BY TRANSFORAMINAL APPROACH Bilateral 12/7/2023    Procedure: Bilateral L5/S1 TF JONATAN;  Surgeon: Nico Angeles MD;  Location: [facility];  Service: Pain Management;  Laterality: Bilateral;     Social History     Socioeconomic History    Marital status:    Tobacco Use    Smoking status: Some Days     Types: Vaping with nicotine    Smokeless tobacco: Never   Substance and Sexual Activity    Alcohol use: Yes     Alcohol/week: 1.0 standard drink of alcohol     Types: 1 Shots of liquor per week     Comment: once a month    Drug use: Never     Sexual activity: Yes     Partners: Male     Family History   Problem Relation Age of Onset    Diabetes type II Mother     Diabetes type II Father     Hypertension Father     Diabetes type II Brother     Cancer Maternal Grandmother     Diabetes Maternal Grandfather     No Known Problems Paternal Grandmother     Heart attack Paternal Grandfather        Review of patient's allergies indicates:  No Known Allergies      Current Outpatient Medications   Medication Sig    celecoxib (CELEBREX) 200 MG capsule Take 1 capsule (200 mg total) by mouth 2 (two) times daily.    cyclobenzaprine (FLEXERIL) 10 MG tablet Take 1 tablet (10 mg total) by mouth every evening.    HYDROcodone-acetaminophen (NORCO) 5-325 mg per tablet Take 1 tablet by mouth every 6 (six) hours as needed for Pain.    JARDIANCE 25 mg tablet Take 25 mg by mouth.    metFORMIN (GLUCOPHAGE-XR) 750 MG ER 24hr tablet Take two tablets with breakfast and one in the evenings.    rosuvastatin (CRESTOR) 20 MG tablet Take 1 tablet (20 mg total) by mouth once daily.    semaglutide (OZEMPIC) 1 mg/dose (4 mg/3 mL) Inject 1 mg into the skin every 7 days.    gabapentin (NEURONTIN) 600 MG tablet Take 2 tablets (1,200 mg total) by mouth 2 (two) times daily.     Current Facility-Administered Medications   Medication    testosterone cypionate injection 400 mg       Review of Systems     GENERAL:  No weight loss, malaise or fevers.  HEENT:   No recent changes in vision or hearing  NECK:  Negative for lumps, no difficulty with swallowing.  RESPIRATORY:  Negative for cough, wheezing or shortness of breath, patient denies any recent URI.  CARDIOVASCULAR:  Negative for chest pain, leg swelling or palpitations.  GI:  Negative for abdominal discomfort, blood in stools or black stools or change in bowel habits.  MUSCULOSKELETAL:  See HPI.  SKIN:  Negative for lesions, rash, and itching.  PSYCH:  No mood disorder or recent psychosocial stressors.  Patients sleep is not disturbed secondary  "to pain.  HEMATOLOGY/LYMPHOLOGY:  Negative for prolonged bleeding, bruising easily or swollen nodes.  Patient is not currently taking any anti-coagulants  NEURO:   No history of headaches, syncope, paralysis, seizures or tremors.  All other reviewed and negative other than HPI.    OBJECTIVE:    /72   Pulse 97   Ht 5' 11" (1.803 m)   Wt 109.4 kg (241 lb 1.2 oz)   BMI 33.62 kg/m²         Physical Exam    GENERAL: Well appearing, in no acute distress, alert and oriented x3.  PSYCH:  Mood and affect appropriate.  SKIN: Skin color, texture, turgor normal, no rashes or lesions.  HEAD/FACE:  Normocephalic, atraumatic. Cranial nerves grossly intact.  CV: RRR with palpation of the radial artery.  PULM: No evidence of respiratory difficulty, symmetric chest rise.  GI:  Soft and non-tender, negative heredia's sign, negative Mcburney's sign, negative CVA tenderness  BACK: Straight leg raising in the sitting and supine positions is positive to radicular pain on the right, equivocal to radicular pain on the left.  Moderate pain to palpation over the facet joints of the lumbar spine and lumbar paraspinals, mostly on the left   Limited range of motion secondary to pain reproduction.  EXTREMITIES: No deformities, edema, or skin discoloration. Good capillary refill.  MUSCULOSKELETAL:  Hip and knee provocative maneuvers are negative.  There is no pain with palpation over the sacroiliac joints bilaterally. Moderated tenderness along right quadratus lumborum. FABERs test is negative.  FADIRs test is negative.   Bilateral upper and lower extremity strength is normal and symmetric with the exception of left-sided footdrop at 3/5.  No atrophy or tone abnormalities are noted.  NEURO: Bilateral upper and lower extremity coordination and muscle stretch reflexes are physiologic and symmetric.  Plantar response are downgoing. No clonus.  No loss of sensation is noted.  GAIT:  slow, antalgic, steppage gait.      LABS:  Lab Results "   Component Value Date    WBC 7.86 02/16/2023    HGB 14.9 02/16/2023    HCT 45.5 02/16/2023    MCV 86 02/16/2023     02/16/2023       CMP  Sodium   Date Value Ref Range Status   11/21/2023 140 136 - 145 mmol/L Final     Potassium   Date Value Ref Range Status   11/21/2023 4.1 3.5 - 5.1 mmol/L Final     Chloride   Date Value Ref Range Status   11/21/2023 102 95 - 110 mmol/L Final     CO2   Date Value Ref Range Status   11/21/2023 29 23 - 29 mmol/L Final     Glucose   Date Value Ref Range Status   11/21/2023 131 (H) 70 - 110 mg/dL Final     BUN   Date Value Ref Range Status   11/21/2023 14 6 - 20 mg/dL Final     Creatinine   Date Value Ref Range Status   11/21/2023 1.0 0.5 - 1.4 mg/dL Final     Calcium   Date Value Ref Range Status   11/21/2023 8.8 8.7 - 10.5 mg/dL Final     Total Protein   Date Value Ref Range Status   11/21/2023 6.9 6.0 - 8.4 g/dL Final     Albumin   Date Value Ref Range Status   11/21/2023 4.2 3.5 - 5.2 g/dL Final   11/04/2022 4.8 3.6 - 5.1 g/dL Final     Total Bilirubin   Date Value Ref Range Status   11/21/2023 0.7 0.1 - 1.0 mg/dL Final     Comment:     For infants and newborns, interpretation of results should be based  on gestational age, weight and in agreement with clinical  observations.    Premature Infant recommended reference ranges:  Up to 24 hours.............<8.0 mg/dL  Up to 48 hours............<12.0 mg/dL  3-5 days..................<15.0 mg/dL  6-29 days.................<15.0 mg/dL       Alkaline Phosphatase   Date Value Ref Range Status   11/21/2023 51 (L) 55 - 135 U/L Final     AST   Date Value Ref Range Status   11/21/2023 25 10 - 40 U/L Final     ALT   Date Value Ref Range Status   11/21/2023 30 10 - 44 U/L Final     Anion Gap   Date Value Ref Range Status   11/21/2023 9 8 - 16 mmol/L Final     eGFR if    Date Value Ref Range Status   06/06/2022 >60.0 >60 mL/min/1.73 m^2 Final     eGFR if non    Date Value Ref Range Status   06/06/2022 >60.0  >60 mL/min/1.73 m^2 Final     Comment:     Calculation used to obtain the estimated glomerular filtration  rate (eGFR) is the CKD-EPI equation.          Lab Results   Component Value Date    HGBA1C 6.6 (H) 11/21/2023             ASSESSMENT: 48 y.o. year old male with chronic lower back pain, consistent with     1. Lumbar radiculopathy  Case Request-RAD/Other Procedure Area: Bilateral L5/S1 TF JONATAN      2. Chronic bilateral low back pain without sciatica        3. Foot drop, left        4. Spinal stenosis of lumbar region with neurogenic claudication        5. DDD (degenerative disc disease), lumbar        6. Lumbar spondylosis                  PLAN:   - Interventions:  Tentatively plan for repeat bilateral L5-S1 TFESI at the end of February/early March  s/p bilateral L5/S1 TF JONATAN on 12/07/2023 with 70% relief overall  -s/p bilateral L5/S1 TF JONATAN on 4/4/23 with 95% relief x 1 week, then 20% relief  s/p L5/S1 IL JONATAN with 80% relief with the initial 2 weeks.      - Anticoagulation use: no       - Medications: I have stressed the importance of physical activity and a home exercise plan to help with pain and improve health. and Patient can continue with medications for now since they are providing benefits, using them appropriately, and without side effects.        continue Norco 5/325 mg Q 8 p.r.n., recently filled on 01/22/2024  - adjust gabapentin to 1200 mg BID  -Continue Celebrex 100 mg BID for day time pain/inflammation  -Continue Flexeril 10 mg nightly        - Therapy:  Advised patient continue with activities as tolerated    - Psychological:  Discussed coping mechanisms to help address chronic pain issues    - Labs:  Reviewed    - Imaging: Reviewed available imaging with patient and answered any questions they had regarding study.      - Consults/Referrals:  None at this time    - Records:  Reviewed/Obtain old records from outside physicians and imaging    - Follow up visit:  4 weeks post procedure or as  needed    - Counseled patient regarding the importance of activity modification and physical therapy    - This condition does not require this patient to take time off of work, and the primary goal of our Pain Management services is to improve the patient's functional capacity.    - Patient Questions: Answered all of the patient's questions regarding diagnosis, therapy, and treatment        The above plan and management options were discussed at length with patient. Patient is in agreement with the above and verbalized understanding.    I discussed the goals of interventional chronic pain management with the patient on today's visit.  I explained the utility of injections for diagnostic and therapeutic purposes.  We discussed a multimodal approach to pain including treating the patient's given worst pain at any given time.  We will use a systematic approach to addressing pain.  We will also adopt a multimodal approach that includes injections, adjuvant medications, physical therapy, at times psychiatry.  There may be a limited role for opioid use intermittently in the treatment of pain, more particularly for acute pain although no one approach can be used as a sole treatment modality.    I emphasized the importance of regular exercise, core strengthening and stretching, diet and weight loss as a cornerstone of long-term pain management.      Nico Angeles MD  Interventional Pain Management  [facility]    Disclaimer:  This note was prepared using voice recognition system and is likely to have sound alike errors that may have been overlooked even after proof reading.  Please call me with any questions

## 2024-01-31 ENCOUNTER — CLINICAL SUPPORT (OUTPATIENT)
Dept: INTERNAL MEDICINE | Facility: CLINIC | Age: 49
End: 2024-01-31
Payer: COMMERCIAL

## 2024-01-31 DIAGNOSIS — E34.9 TESTOSTERONE DEFICIENCY: Primary | ICD-10-CM

## 2024-01-31 PROCEDURE — 96372 THER/PROPH/DIAG INJ SC/IM: CPT | Mod: S$GLB,,, | Performed by: FAMILY MEDICINE

## 2024-01-31 PROCEDURE — 99999 PR PBB SHADOW E&M-EST. PATIENT-LVL II: CPT | Mod: PBBFAC,,,

## 2024-01-31 RX ADMIN — TESTOSTERONE CYPIONATE 400 MG: 200 INJECTION, SOLUTION INTRAMUSCULAR at 03:01

## 2024-01-31 NOTE — PROGRESS NOTES
Depotestosterone 400 mg ( 2 ml ) given IM in right ventral gluteus  Lot #: [id number]  Exp : 04/30/25   NDC #: 04061-020-15  Manufactory: [employer]    Pt waited 15 minutes without a reaction notices

## 2024-02-03 DIAGNOSIS — G89.29 CHRONIC BILATERAL LOW BACK PAIN WITHOUT SCIATICA: ICD-10-CM

## 2024-02-03 DIAGNOSIS — M54.50 CHRONIC BILATERAL LOW BACK PAIN WITHOUT SCIATICA: ICD-10-CM

## 2024-02-03 DIAGNOSIS — M54.16 LUMBAR RADICULOPATHY: ICD-10-CM

## 2024-02-03 DIAGNOSIS — M47.816 LUMBAR SPONDYLOSIS: ICD-10-CM

## 2024-02-03 NOTE — TELEPHONE ENCOUNTER
No care due was identified.  [facility] Embedded Care Due Messages. Reference number: [id number].   2/03/2024 3:14:28 PM CST

## 2024-02-05 RX ORDER — METFORMIN HYDROCHLORIDE 750 MG/1
TABLET, EXTENDED RELEASE ORAL
Qty: 90 TABLET | Refills: 11 | Status: SHIPPED | OUTPATIENT
Start: 2024-02-05

## 2024-02-05 RX ORDER — HYDROCODONE BITARTRATE AND ACETAMINOPHEN 5; 325 MG/1; MG/1
1 TABLET ORAL EVERY 6 HOURS PRN
Qty: 21 TABLET | Refills: 0 | Status: SHIPPED | OUTPATIENT
Start: 2024-02-05 | End: 2024-02-19 | Stop reason: SDUPTHER

## 2024-02-05 RX ORDER — CYCLOBENZAPRINE HCL 10 MG
10 TABLET ORAL NIGHTLY
Qty: 30 TABLET | Refills: 1 | Status: SHIPPED | OUTPATIENT
Start: 2024-02-05 | End: 2024-03-11 | Stop reason: SDUPTHER

## 2024-02-13 ENCOUNTER — PATIENT MESSAGE (OUTPATIENT)
Dept: PAIN MEDICINE | Facility: CLINIC | Age: 49
End: 2024-02-13
Payer: COMMERCIAL

## 2024-02-19 DIAGNOSIS — M47.816 LUMBAR SPONDYLOSIS: ICD-10-CM

## 2024-02-19 DIAGNOSIS — G89.29 CHRONIC BILATERAL LOW BACK PAIN WITHOUT SCIATICA: ICD-10-CM

## 2024-02-19 DIAGNOSIS — M54.50 CHRONIC BILATERAL LOW BACK PAIN WITHOUT SCIATICA: ICD-10-CM

## 2024-02-19 DIAGNOSIS — M54.16 LUMBAR RADICULOPATHY: ICD-10-CM

## 2024-02-19 RX ORDER — CYCLOBENZAPRINE HCL 10 MG
10 TABLET ORAL NIGHTLY
Qty: 30 TABLET | Refills: 1 | Status: CANCELLED | OUTPATIENT
Start: 2024-02-19

## 2024-02-20 RX ORDER — HYDROCODONE BITARTRATE AND ACETAMINOPHEN 5; 325 MG/1; MG/1
1 TABLET ORAL EVERY 6 HOURS PRN
Qty: 21 TABLET | Refills: 0 | Status: SHIPPED | OUTPATIENT
Start: 2024-02-20 | End: 2024-03-12 | Stop reason: SDUPTHER

## 2024-02-22 ENCOUNTER — PATIENT MESSAGE (OUTPATIENT)
Dept: INTERNAL MEDICINE | Facility: CLINIC | Age: 49
End: 2024-02-22
Payer: COMMERCIAL

## 2024-02-23 ENCOUNTER — CLINICAL SUPPORT (OUTPATIENT)
Dept: INTERNAL MEDICINE | Facility: CLINIC | Age: 49
End: 2024-02-23
Payer: COMMERCIAL

## 2024-02-23 DIAGNOSIS — E34.9 TESTOSTERONE DEFICIENCY: Primary | ICD-10-CM

## 2024-02-23 PROCEDURE — 99999 PR PBB SHADOW E&M-EST. PATIENT-LVL II: CPT | Mod: PBBFAC,,,

## 2024-02-23 PROCEDURE — 96372 THER/PROPH/DIAG INJ SC/IM: CPT | Mod: S$GLB,,, | Performed by: FAMILY MEDICINE

## 2024-02-23 RX ORDER — TESTOSTERONE CYPIONATE 200 MG/ML
400 INJECTION, SOLUTION INTRAMUSCULAR
Status: COMPLETED | OUTPATIENT
Start: 2024-02-23 | End: 2024-05-30

## 2024-02-23 RX ADMIN — TESTOSTERONE CYPIONATE 400 MG: 200 INJECTION, SOLUTION INTRAMUSCULAR at 04:02

## 2024-02-23 NOTE — PROGRESS NOTES
Depotestosterone 400 mg ( 2 ml ) given IM in left ventral gluteus  Lot #: [id number]    Exp : 04/30/25   NDC #: 75932-772-75  Manufactory: [employer]    Pt waited 15 minutes without a reaction notices

## 2024-03-01 ENCOUNTER — PATIENT MESSAGE (OUTPATIENT)
Dept: PAIN MEDICINE | Facility: CLINIC | Age: 49
End: 2024-03-01
Payer: COMMERCIAL

## 2024-03-08 ENCOUNTER — CLINICAL SUPPORT (OUTPATIENT)
Dept: INTERNAL MEDICINE | Facility: CLINIC | Age: 49
End: 2024-03-08
Payer: COMMERCIAL

## 2024-03-08 DIAGNOSIS — E34.9 TESTOSTERONE DEFICIENCY: Primary | ICD-10-CM

## 2024-03-08 PROCEDURE — 99999 PR PBB SHADOW E&M-EST. PATIENT-LVL II: CPT | Mod: PBBFAC,,,

## 2024-03-08 PROCEDURE — 96372 THER/PROPH/DIAG INJ SC/IM: CPT | Mod: S$GLB,,, | Performed by: FAMILY MEDICINE

## 2024-03-08 RX ADMIN — TESTOSTERONE CYPIONATE 400 MG: 200 INJECTION, SOLUTION INTRAMUSCULAR at 09:03

## 2024-03-08 NOTE — PRE-PROCEDURE INSTRUCTIONS
Spoke with patient regarding procedure scheduled on 3.21    Arrival time 0800     Has patient been sick with fever or on antibiotics within the last 7 days? No     Does the patient have any open wounds, sores or rashes? No     Does the patient have any recent fractures? no     Has patient received a vaccination within the last 7 days? No     Received the COVID vaccination? yes     Has the patient stopped all medications as directed? na     Does patient have a pacemaker, defibrillator, or implantable stimulator? No     Does the patient have a ride to and from procedure and someone reliable to remain with patient?  wife     Is the patient diabetic? yes      Does the patient have sleep apnea? Or use O2 at home? no     Is the patient receiving sedation? yes     Is the patient instructed to remain NPO beginning at midnight the night before their procedure? yes     Procedure location confirmed with patient? Yes     Covid- Denies signs/symptoms. Instructed to notify PAT/MD if any changes.

## 2024-03-08 NOTE — PROGRESS NOTES
Depotestosterone 400 mg ( 2 ml ) given IM in right ventral gluteus   Lot #: [id number]   Exp: 04/30/25   NDC #: 63555-015-85  Manufactory: [employer]    Pt waited 15 minutes without a reaction notices

## 2024-03-09 NOTE — TELEPHONE ENCOUNTER
Care Due:                  Date            Visit Type   Department     Provider  --------------------------------------------------------------------------------                                EP -                              PRIMARY      [facility] INTERNAL  Last Visit: 12-      CARE ([facility])   PAMELA Mclaughlin                              EP -                              PRIMARY      [facility] INTERNAL  Next Visit: 06-      CARE ([facility])   [facility]       Genevieve Mclaughlin                                                            Last  Test          Frequency    Reason                     Performed    Due Date  --------------------------------------------------------------------------------    CBC.........  12 months..  celecoxib................  02- 02-    HBA1C.......  6 months...  metFORMIN, semaglutide...  11- 05-    [facility] Embedded Care Due Messages. Reference number: [id number].   3/09/2024 7:00:28 AM CST

## 2024-03-11 DIAGNOSIS — M54.50 CHRONIC BILATERAL LOW BACK PAIN WITHOUT SCIATICA: ICD-10-CM

## 2024-03-11 DIAGNOSIS — M54.16 LUMBAR RADICULOPATHY: ICD-10-CM

## 2024-03-11 DIAGNOSIS — M47.816 LUMBAR SPONDYLOSIS: ICD-10-CM

## 2024-03-11 DIAGNOSIS — G89.29 CHRONIC BILATERAL LOW BACK PAIN WITHOUT SCIATICA: ICD-10-CM

## 2024-03-11 RX ORDER — CELECOXIB 200 MG/1
200 CAPSULE ORAL 2 TIMES DAILY
Qty: 60 CAPSULE | Refills: 1 | Status: SHIPPED | OUTPATIENT
Start: 2024-03-11 | End: 2024-05-06

## 2024-03-11 RX ORDER — HYDROCODONE BITARTRATE AND ACETAMINOPHEN 5; 325 MG/1; MG/1
1 TABLET ORAL EVERY 6 HOURS PRN
Qty: 21 TABLET | Refills: 0 | Status: CANCELLED | OUTPATIENT
Start: 2024-03-11

## 2024-03-11 NOTE — TELEPHONE ENCOUNTER
Can you refill? NORCO 5-325mg    Last Visit: 01/24/24  Next Visit: 04/17/24  Last refill: 02/20/24  How pt patient is currently taking medication requested: 1 tab PO Q every 6 hrs PRN for pain  Pharmacy: [id number] - [place], [place] - [address] AT [facility] [address]       Can you refill? NORCO 5-325mg    Last Visit: 01/24/24  Next Visit: 04/17/24  Last refill: 02/05/24  How pt patient is currently taking medication requested: 1 tab PO QHS  Pharmacy: [facility] #[id number] - [place], [place] - [address] AT [facility] [address]

## 2024-03-12 ENCOUNTER — PATIENT MESSAGE (OUTPATIENT)
Dept: PAIN MEDICINE | Facility: CLINIC | Age: 49
End: 2024-03-12
Payer: COMMERCIAL

## 2024-03-12 DIAGNOSIS — M54.16 LUMBAR RADICULOPATHY: ICD-10-CM

## 2024-03-12 DIAGNOSIS — M47.816 LUMBAR SPONDYLOSIS: ICD-10-CM

## 2024-03-12 RX ORDER — CYCLOBENZAPRINE HCL 10 MG
10 TABLET ORAL NIGHTLY
Qty: 30 TABLET | Refills: 1 | Status: SHIPPED | OUTPATIENT
Start: 2024-03-12 | End: 2024-06-12 | Stop reason: SDUPTHER

## 2024-03-12 RX ORDER — HYDROCODONE BITARTRATE AND ACETAMINOPHEN 5; 325 MG/1; MG/1
1 TABLET ORAL EVERY 6 HOURS PRN
Qty: 21 TABLET | Refills: 0 | Status: SHIPPED | OUTPATIENT
Start: 2024-03-12 | End: 2024-03-25 | Stop reason: SDUPTHER

## 2024-03-21 ENCOUNTER — HOSPITAL ENCOUNTER (OUTPATIENT)
Facility: HOSPITAL | Age: 49
Discharge: HOME | End: 2024-03-21
Attending: PHYSICAL MEDICINE & REHABILITATION | Admitting: PHYSICAL MEDICINE & REHABILITATION
Payer: COMMERCIAL

## 2024-03-21 VITALS
BODY MASS INDEX: 34.84 KG/M2 | SYSTOLIC BLOOD PRESSURE: 132 MMHG | OXYGEN SATURATION: 98 % | DIASTOLIC BLOOD PRESSURE: 65 MMHG | TEMPERATURE: 98 F | HEART RATE: 91 BPM | RESPIRATION RATE: 17 BRPM | HEIGHT: 71 IN | WEIGHT: 248.88 LBS

## 2024-03-21 DIAGNOSIS — M54.16 LUMBAR RADICULOPATHY: Primary | ICD-10-CM

## 2024-03-21 LAB — POCT GLUCOSE: 140 MG/DL (ref 70–110)

## 2024-03-21 PROCEDURE — 63600175 PHARM REV CODE 636 W HCPCS: Performed by: PHYSICAL MEDICINE & REHABILITATION

## 2024-03-21 PROCEDURE — 64483 NJX AA&/STRD TFRM EPI L/S 1: CPT | Mod: 50,,, | Performed by: PHYSICAL MEDICINE & REHABILITATION

## 2024-03-21 PROCEDURE — 25500020 PHARM REV CODE 255: Performed by: PHYSICAL MEDICINE & REHABILITATION

## 2024-03-21 PROCEDURE — 64483 NJX AA&/STRD TFRM EPI L/S 1: CPT | Mod: 50 | Performed by: PHYSICAL MEDICINE & REHABILITATION

## 2024-03-21 RX ORDER — BUPIVACAINE HYDROCHLORIDE 2.5 MG/ML
INJECTION, SOLUTION EPIDURAL; INFILTRATION; INTRACAUDAL
Status: DISCONTINUED | OUTPATIENT
Start: 2024-03-21 | End: 2024-03-21 | Stop reason: HOSPADM

## 2024-03-21 RX ORDER — FENTANYL CITRATE 50 UG/ML
INJECTION, SOLUTION INTRAMUSCULAR; INTRAVENOUS
Status: DISCONTINUED | OUTPATIENT
Start: 2024-03-21 | End: 2024-03-21 | Stop reason: HOSPADM

## 2024-03-21 RX ORDER — METHYLPREDNISOLONE ACETATE 40 MG/ML
INJECTION, SUSPENSION INTRA-ARTICULAR; INTRALESIONAL; INTRAMUSCULAR; SOFT TISSUE
Status: DISCONTINUED | OUTPATIENT
Start: 2024-03-21 | End: 2024-03-21 | Stop reason: HOSPADM

## 2024-03-21 RX ORDER — ONDANSETRON HYDROCHLORIDE 2 MG/ML
4 INJECTION, SOLUTION INTRAVENOUS ONCE AS NEEDED
Status: COMPLETED | OUTPATIENT
Start: 2024-03-21 | End: 2024-03-21

## 2024-03-21 RX ORDER — MIDAZOLAM HYDROCHLORIDE 1 MG/ML
INJECTION, SOLUTION INTRAMUSCULAR; INTRAVENOUS
Status: DISCONTINUED | OUTPATIENT
Start: 2024-03-21 | End: 2024-03-21 | Stop reason: HOSPADM

## 2024-03-21 RX ADMIN — ONDANSETRON 4 MG: 2 INJECTION INTRAMUSCULAR; INTRAVENOUS at 09:03

## 2024-03-21 NOTE — OP NOTE
INFORMED CONSENT: The procedure, risks, benefits and options were discussed with patient. There are no contraindications to the procedure. The patient expressed understanding and agreed to proceed. The personnel performing the procedure was discussed.    03/21/2024    Surgeon: Nico Angeles MD    Assistants: None    SEDATION: Conscious sedation provided by M.D    The patient was monitored with continuous pulse oximetry, EKG, and intermittent blood pressure monitors, immediately prior to administration of sedation.  The patient was hemodynamically stable throughout the entire process was responsive to voice, and breathing spontaneously.  Supplemental O2 was provided at 2L/min via nasal cannula.  Patient was comfortable for the duration of the procedure.     There was a total of 2mg IV Midazolam and 100mcg Fentanyl titrated for the procedure    Total sedation time was >10minutes and <20minutes      PROCEDURE:  Bilateral  L5/S1  1) Left  L5/S1 TRANSFORAMINAL EPIDURAL STEROID INJECTION  2) Right  L5/S1 TRANSFORAMINAL EPIDURAL STEROID INJECTION      Pre Procedure diagnosis:  Bilateral L5/S1 Lumbar radiculopathy [M54.16]    Post-Procedure diagnosis:   same    Complications: None    Specimens: None      DESCRIPTION OF PROCEDURE: The patient was brought to the procedure room. IV access was obtained prior to the procedure. The patient was positioned prone on the fluoroscopy table. Continuous hemodynamic monitoring was initiated including blood pressure, EKG, and pulse oximetry. . The skin was prepped with chlorhexidine and draped in a sterile fashion. Skin anesthesia was achieved using a total of 10mL of lidocaine, 5mL over each respective injection site.     The  L5/S1 transforaminal spaces were identified with fluoroscopy in the  AP, oblique, and lateral views.  A 22 gauge spinal quinke needle was then advanced into the area of the trans foraminal spaces bilaterally with confirmation of proper needle position using AP,  oblique, and lateral fluoroscopic views. Once the needle tip was in the area of the transforaminal space, and there was no blood, CSF or paraesthesias,  1.5 mL of Omnipaque 300mg/ml was injected on each side for a total of 3mL.  Fluoroscopic imaging in the AP and lateral views revealed a clear outline of the spinal nerve with proximal spread of agent through the neural foramen into the epidural space. A total combination of 1 mL of Bupivicaine 0.25% and 40 mg depo medrol was injected on each side for a total of 4mL of injected medications with displacement of the contrast dye confirming that the medication went into the area of the transforaminal spaces bilaterally. A sterile dressing was applied.   Patient tolerated the procedure well.    Patient was taken back to the recovery room for further observation.     The patient was discharged to home in stable condition

## 2024-03-21 NOTE — DISCHARGE SUMMARY
Discharge Note  Short Stay      SUMMARY     Admit Date: 3/21/2024    Attending Physician: Nico Angeles MD        Discharge Physician: Nico Angeles MD        Discharge Date: 3/21/2024 10:01 AM    Procedure(s) (LRB):  Bilateral L5/S1 TF JONATAN (Bilateral)    Final Diagnosis: Lumbar radiculopathy [M54.16]    Disposition: Home or self care    Patient Instructions:   Current Discharge Medication List        CONTINUE these medications which have NOT CHANGED    Details   celecoxib (CELEBREX) 200 MG capsule Take 1 capsule (200 mg total) by mouth 2 (two) times daily.  Qty: 60 capsule, Refills: 1      cyclobenzaprine (FLEXERIL) 10 MG tablet Take 1 tablet (10 mg total) by mouth every evening.  Qty: 30 tablet, Refills: 1    Associated Diagnoses: Lumbar radiculopathy; Chronic bilateral low back pain without sciatica      gabapentin (NEURONTIN) 600 MG tablet Take 2 tablets (1,200 mg total) by mouth 2 (two) times daily.  Qty: 120 tablet, Refills: 11      HYDROcodone-acetaminophen (NORCO) 5-325 mg per tablet Take 1 tablet by mouth every 6 (six) hours as needed for Pain.  Qty: 21 tablet, Refills: 0    Comments: Quantity prescribed more than 7 day supply? No  Associated Diagnoses: Lumbar radiculopathy; Lumbar spondylosis      JARDIANCE 25 mg tablet Take 25 mg by mouth.      metFORMIN (GLUCOPHAGE-XR) 750 MG ER 24hr tablet Take two tablets with breakfast and one in the evenings.  Qty: 90 tablet, Refills: 11      rosuvastatin (CRESTOR) 20 MG tablet Take 1 tablet (20 mg total) by mouth once daily.  Qty: 90 tablet, Refills: 3      semaglutide (OZEMPIC) 1 mg/dose (4 mg/3 mL) Inject 1 mg into the skin every 7 days.  Qty: 3 mL, Refills: 11    Associated Diagnoses: Type 2 diabetes mellitus without complication, without long-term current use of insulin                 Discharge Diagnosis: Lumbar radiculopathy [M54.16]  Condition on Discharge: Stable with no complications to procedure   Diet on Discharge: Same as before.  Activity: as  per instruction sheet.  Discharge to: Home with a responsible adult.  Follow up: 2-4 weeks       Please call the office at [phone] if you experience any weakness or loss of sensation, fever > 101.5, pain uncontrolled with oral medications, persistent nausea/vomiting/or diarrhea, redness or drainage from the incisions, or any other worrisome concerns. If physician on call was not reached or could not communicate with our office for any reason please go to the nearest emergency department

## 2024-03-21 NOTE — DISCHARGE INSTRUCTIONS

## 2024-03-22 ENCOUNTER — CLINICAL SUPPORT (OUTPATIENT)
Dept: INTERNAL MEDICINE | Facility: CLINIC | Age: 49
End: 2024-03-22
Payer: COMMERCIAL

## 2024-03-22 DIAGNOSIS — E34.9 TESTOSTERONE DEFICIENCY: Primary | ICD-10-CM

## 2024-03-22 PROCEDURE — 96372 THER/PROPH/DIAG INJ SC/IM: CPT | Mod: S$GLB,,, | Performed by: FAMILY MEDICINE

## 2024-03-22 PROCEDURE — 99999 PR PBB SHADOW E&M-EST. PATIENT-LVL II: CPT | Mod: PBBFAC,,,

## 2024-03-22 RX ADMIN — TESTOSTERONE CYPIONATE 400 MG: 200 INJECTION, SOLUTION INTRAMUSCULAR at 02:03

## 2024-03-22 NOTE — PROGRESS NOTES
Patient came into clinic today for DepoTestosterone 400mg as ordered by . Pt instructed to wait 15 minutes following injection for possible reaction. pt verbalized understanding and tolerated well.     Lot # [id number]  Expires 02/2025

## 2024-03-24 ENCOUNTER — PATIENT MESSAGE (OUTPATIENT)
Dept: PAIN MEDICINE | Facility: CLINIC | Age: 49
End: 2024-03-24
Payer: COMMERCIAL

## 2024-03-24 ENCOUNTER — PATIENT MESSAGE (OUTPATIENT)
Dept: INTERNAL MEDICINE | Facility: CLINIC | Age: 49
End: 2024-03-24
Payer: COMMERCIAL

## 2024-03-24 DIAGNOSIS — M47.816 LUMBAR SPONDYLOSIS: ICD-10-CM

## 2024-03-24 DIAGNOSIS — M54.16 LUMBAR RADICULOPATHY: ICD-10-CM

## 2024-03-25 RX ORDER — HYDROCODONE BITARTRATE AND ACETAMINOPHEN 5; 325 MG/1; MG/1
1 TABLET ORAL EVERY 6 HOURS PRN
Qty: 21 TABLET | Refills: 0 | Status: SHIPPED | OUTPATIENT
Start: 2024-03-25 | End: 2024-04-12 | Stop reason: SDUPTHER

## 2024-04-05 ENCOUNTER — CLINICAL SUPPORT (OUTPATIENT)
Dept: INTERNAL MEDICINE | Facility: CLINIC | Age: 49
End: 2024-04-05
Payer: COMMERCIAL

## 2024-04-05 DIAGNOSIS — E34.9 TESTOSTERONE DEFICIENCY: Primary | ICD-10-CM

## 2024-04-05 PROCEDURE — 99999 PR PBB SHADOW E&M-EST. PATIENT-LVL II: CPT | Mod: PBBFAC,,,

## 2024-04-05 PROCEDURE — 96372 THER/PROPH/DIAG INJ SC/IM: CPT | Mod: S$GLB,,, | Performed by: FAMILY MEDICINE

## 2024-04-05 RX ADMIN — TESTOSTERONE CYPIONATE 400 MG: 200 INJECTION, SOLUTION INTRAMUSCULAR at 11:04

## 2024-04-05 NOTE — PROGRESS NOTES
Depotestosterone 400 mg given IM in left ventral gluteus  Lot #: [id number]    Exp: 02/28/25    NDC #: 82314-478-22  Manufactory: [employer]    Pt waited 15 minutes without a reaction notices

## 2024-04-10 ENCOUNTER — PATIENT MESSAGE (OUTPATIENT)
Dept: PAIN MEDICINE | Facility: CLINIC | Age: 49
End: 2024-04-10
Payer: COMMERCIAL

## 2024-04-12 DIAGNOSIS — M47.816 LUMBAR SPONDYLOSIS: ICD-10-CM

## 2024-04-12 DIAGNOSIS — M54.16 LUMBAR RADICULOPATHY: ICD-10-CM

## 2024-04-12 RX ORDER — HYDROCODONE BITARTRATE AND ACETAMINOPHEN 5; 325 MG/1; MG/1
1 TABLET ORAL EVERY 6 HOURS PRN
Qty: 21 TABLET | Refills: 0 | Status: SHIPPED | OUTPATIENT
Start: 2024-04-12 | End: 2024-04-28 | Stop reason: SDUPTHER

## 2024-04-12 NOTE — TELEPHONE ENCOUNTER
Pt is requesting for a refill of: HYDROcodone-acetaminophen (NORCO) 5-325 mg per tablet ( 21 tablets)   Last filed:3/25/24   Last encounter: 1/24/24  Last proc: 3/21/24   Up coming apt: 4/17/24   Pharmacy:[id number] - [address]   Is this something you can do?

## 2024-04-22 ENCOUNTER — CLINICAL SUPPORT (OUTPATIENT)
Dept: INTERNAL MEDICINE | Facility: CLINIC | Age: 49
End: 2024-04-22
Payer: COMMERCIAL

## 2024-04-22 ENCOUNTER — PATIENT MESSAGE (OUTPATIENT)
Dept: INTERNAL MEDICINE | Facility: CLINIC | Age: 49
End: 2024-04-22

## 2024-04-22 DIAGNOSIS — E34.9 TESTOSTERONE DEFICIENCY: Primary | ICD-10-CM

## 2024-04-22 PROCEDURE — 96372 THER/PROPH/DIAG INJ SC/IM: CPT | Mod: S$GLB,,, | Performed by: FAMILY MEDICINE

## 2024-04-22 PROCEDURE — 99999 PR PBB SHADOW E&M-EST. PATIENT-LVL II: CPT | Mod: PBBFAC,,,

## 2024-04-22 RX ADMIN — TESTOSTERONE CYPIONATE 400 MG: 200 INJECTION, SOLUTION INTRAMUSCULAR at 03:04

## 2024-04-22 NOTE — PROGRESS NOTES
Depotestosterone 400mg given IM as ordered by . Pt instructed to wait 15 minutes following injection for possible reaction. Pt verbalized understanding and tolerated well.     Lot # [id number]  Expires 02/2025

## 2024-04-28 ENCOUNTER — PATIENT MESSAGE (OUTPATIENT)
Dept: PAIN MEDICINE | Facility: CLINIC | Age: 49
End: 2024-04-28
Payer: COMMERCIAL

## 2024-04-28 DIAGNOSIS — M54.16 LUMBAR RADICULOPATHY: ICD-10-CM

## 2024-04-28 DIAGNOSIS — M47.816 LUMBAR SPONDYLOSIS: ICD-10-CM

## 2024-04-29 RX ORDER — HYDROCODONE BITARTRATE AND ACETAMINOPHEN 5; 325 MG/1; MG/1
1 TABLET ORAL EVERY 6 HOURS PRN
Qty: 21 TABLET | Refills: 0 | Status: SHIPPED | OUTPATIENT
Start: 2024-04-29 | End: 2024-05-13 | Stop reason: SDUPTHER

## 2024-05-03 ENCOUNTER — CLINICAL SUPPORT (OUTPATIENT)
Dept: INTERNAL MEDICINE | Facility: CLINIC | Age: 49
End: 2024-05-03
Payer: COMMERCIAL

## 2024-05-03 DIAGNOSIS — E34.9 TESTOSTERONE DEFICIENCY: Primary | ICD-10-CM

## 2024-05-03 PROCEDURE — 99999 PR PBB SHADOW E&M-EST. PATIENT-LVL II: CPT | Mod: PBBFAC,,,

## 2024-05-03 PROCEDURE — 96372 THER/PROPH/DIAG INJ SC/IM: CPT | Mod: S$GLB,,, | Performed by: FAMILY MEDICINE

## 2024-05-03 RX ADMIN — TESTOSTERONE CYPIONATE 400 MG: 200 INJECTION, SOLUTION INTRAMUSCULAR at 09:05

## 2024-05-03 NOTE — PROGRESS NOTES
Depotestosterone 400 mg ( 2 ml ) given IM in left ventral gluteus  Lot #: [id number]   Exp : 02/28/25    NDC #: 92134-498-99  Manufactory: Saida      Pt waited 15 minutes without a reaction notices

## 2024-05-04 NOTE — TELEPHONE ENCOUNTER
No care due was identified.  [facility] Embedded Care Due Messages. Reference number: [id number].   5/03/2024 11:04:00 PM CDT

## 2024-05-06 RX ORDER — CELECOXIB 200 MG/1
200 CAPSULE ORAL 2 TIMES DAILY
Qty: 60 CAPSULE | Refills: 1 | Status: SHIPPED | OUTPATIENT
Start: 2024-05-06

## 2024-05-06 NOTE — TELEPHONE ENCOUNTER
Requested Prescriptions     Pending Prescriptions Disp Refills    celecoxib (CELEBREX) 200 MG capsule [Pharmacy Med Name: CELECOXIB 200MG CAPSULES] 60 capsule 1     Sig: TAKE 1 CAPSULE(200 MG) BY MOUTH TWICE DAILY     LV 12/01/2023   NV 06-10/2024  LF  03/11/2024

## 2024-05-12 ENCOUNTER — PATIENT MESSAGE (OUTPATIENT)
Dept: INTERNAL MEDICINE | Facility: CLINIC | Age: 49
End: 2024-05-12
Payer: COMMERCIAL

## 2024-05-12 ENCOUNTER — PATIENT MESSAGE (OUTPATIENT)
Dept: PAIN MEDICINE | Facility: CLINIC | Age: 49
End: 2024-05-12
Payer: COMMERCIAL

## 2024-05-12 DIAGNOSIS — M47.816 LUMBAR SPONDYLOSIS: ICD-10-CM

## 2024-05-12 DIAGNOSIS — M54.16 LUMBAR RADICULOPATHY: ICD-10-CM

## 2024-05-12 NOTE — TELEPHONE ENCOUNTER
Care Due:                  Date            Visit Type   Department     Provider  --------------------------------------------------------------------------------                                EP -                              PRIMARY      [facility] INTERNAL  Last Visit: 12-      CARE ([facility])   MEDICINE       Genevieve Mclaughlin                              EP -                              PRIMARY      [facility] INTERNAL  Next Visit: 06-      CARE ([facility])   [facility]       Genevieve Mclaughlin                                                            Last  Test          Frequency    Reason                     Performed    Due Date  --------------------------------------------------------------------------------    CBC.........  12 months..  celecoxib................  02- 02-    HBA1C.......  6 months...  metFORMIN, semaglutide...  11- 05-    Lipid Panel.  12 months..  rosuvastatin.............  08- 08-    [facility] Embedded Care Due Messages. Reference number: [id number].   5/12/2024 4:13:08 PM CDT

## 2024-05-13 RX ORDER — ROSUVASTATIN CALCIUM 20 MG/1
20 TABLET, COATED ORAL
Qty: 90 TABLET | Refills: 0 | Status: SHIPPED | OUTPATIENT
Start: 2024-05-13

## 2024-05-13 RX ORDER — HYDROCODONE BITARTRATE AND ACETAMINOPHEN 5; 325 MG/1; MG/1
1 TABLET ORAL EVERY 6 HOURS PRN
Qty: 21 TABLET | Refills: 0 | Status: SHIPPED | OUTPATIENT
Start: 2024-05-13 | End: 2024-05-30 | Stop reason: SDUPTHER

## 2024-05-20 ENCOUNTER — CLINICAL SUPPORT (OUTPATIENT)
Dept: INTERNAL MEDICINE | Facility: CLINIC | Age: 49
End: 2024-05-20
Payer: COMMERCIAL

## 2024-05-20 DIAGNOSIS — E34.9 TESTOSTERONE DEFICIENCY: Primary | ICD-10-CM

## 2024-05-20 PROCEDURE — 96372 THER/PROPH/DIAG INJ SC/IM: CPT | Mod: S$GLB,,, | Performed by: FAMILY MEDICINE

## 2024-05-20 PROCEDURE — 99999 PR PBB SHADOW E&M-EST. PATIENT-LVL I: CPT | Mod: PBBFAC,,,

## 2024-05-20 RX ADMIN — TESTOSTERONE CYPIONATE 400 MG: 200 INJECTION, SOLUTION INTRAMUSCULAR at 10:05

## 2024-05-20 NOTE — PROGRESS NOTES
Depotestosterone 400 mg ( 2 ml ) given IM in right ventral gluteus  Lot #: [id number]   Exp: 02/28/25   NDC #: 34923-343-16  Manufactory : [employer]ida    Pt waited 15 minutes without a reaction notices

## 2024-05-27 ENCOUNTER — PATIENT OUTREACH (OUTPATIENT)
Dept: ADMINISTRATIVE | Facility: HOSPITAL | Age: 49
End: 2024-05-27
Payer: COMMERCIAL

## 2024-05-27 DIAGNOSIS — E11.9 TYPE 2 DIABETES MELLITUS WITHOUT COMPLICATION, WITHOUT LONG-TERM CURRENT USE OF INSULIN: Primary | ICD-10-CM

## 2024-05-30 ENCOUNTER — LAB VISIT (OUTPATIENT)
Dept: LAB | Facility: HOSPITAL | Age: 49
End: 2024-05-30
Attending: FAMILY MEDICINE
Payer: COMMERCIAL

## 2024-05-30 ENCOUNTER — CLINICAL SUPPORT (OUTPATIENT)
Dept: INTERNAL MEDICINE | Facility: CLINIC | Age: 49
End: 2024-05-30
Payer: COMMERCIAL

## 2024-05-30 DIAGNOSIS — E11.9 TYPE 2 DIABETES MELLITUS WITHOUT COMPLICATION, WITHOUT LONG-TERM CURRENT USE OF INSULIN: ICD-10-CM

## 2024-05-30 DIAGNOSIS — E34.9 TESTOSTERONE DEFICIENCY: Primary | ICD-10-CM

## 2024-05-30 LAB
ALBUMIN SERPL BCP-MCNC: 4.4 G/DL (ref 3.5–5.2)
ALP SERPL-CCNC: 53 U/L (ref 55–135)
ALT SERPL W/O P-5'-P-CCNC: 45 U/L (ref 10–44)
ANION GAP SERPL CALC-SCNC: 12 MMOL/L (ref 8–16)
AST SERPL-CCNC: 47 U/L (ref 10–40)
BASOPHILS # BLD AUTO: 0.05 K/UL (ref 0–0.2)
BASOPHILS NFR BLD: 0.6 % (ref 0–1.9)
BILIRUB SERPL-MCNC: 0.6 MG/DL (ref 0.1–1)
BUN SERPL-MCNC: 17 MG/DL (ref 6–20)
CALCIUM SERPL-MCNC: 9.3 MG/DL (ref 8.7–10.5)
CHLORIDE SERPL-SCNC: 99 MMOL/L (ref 95–110)
CHOLEST SERPL-MCNC: 120 MG/DL (ref 120–199)
CHOLEST/HDLC SERPL: 2.9 {RATIO} (ref 2–5)
CO2 SERPL-SCNC: 25 MMOL/L (ref 23–29)
CREAT SERPL-MCNC: 1 MG/DL (ref 0.5–1.4)
DIFFERENTIAL METHOD BLD: NORMAL
EOSINOPHIL # BLD AUTO: 0.3 K/UL (ref 0–0.5)
EOSINOPHIL NFR BLD: 3.3 % (ref 0–8)
ERYTHROCYTE [DISTWIDTH] IN BLOOD BY AUTOMATED COUNT: 12.8 % (ref 11.5–14.5)
EST. GFR  (NO RACE VARIABLE): >60 ML/MIN/1.73 M^2
ESTIMATED AVG GLUCOSE: 163 MG/DL (ref 68–131)
GLUCOSE SERPL-MCNC: 121 MG/DL (ref 70–110)
HBA1C MFR BLD: 7.3 % (ref 4–5.6)
HCT VFR BLD AUTO: 45.3 % (ref 40–54)
HDLC SERPL-MCNC: 41 MG/DL (ref 40–75)
HDLC SERPL: 34.2 % (ref 20–50)
HGB BLD-MCNC: 15 G/DL (ref 14–18)
IMM GRANULOCYTES # BLD AUTO: 0.04 K/UL (ref 0–0.04)
IMM GRANULOCYTES NFR BLD AUTO: 0.4 % (ref 0–0.5)
LDLC SERPL CALC-MCNC: 57.4 MG/DL (ref 63–159)
LYMPHOCYTES # BLD AUTO: 3.2 K/UL (ref 1–4.8)
LYMPHOCYTES NFR BLD: 34.8 % (ref 18–48)
MCH RBC QN AUTO: 28.4 PG (ref 27–31)
MCHC RBC AUTO-ENTMCNC: 33.1 G/DL (ref 32–36)
MCV RBC AUTO: 86 FL (ref 82–98)
MONOCYTES # BLD AUTO: 0.5 K/UL (ref 0.3–1)
MONOCYTES NFR BLD: 5.7 % (ref 4–15)
NEUTROPHILS # BLD AUTO: 5 K/UL (ref 1.8–7.7)
NEUTROPHILS NFR BLD: 55.2 % (ref 38–73)
NONHDLC SERPL-MCNC: 79 MG/DL
NRBC BLD-RTO: 0 /100 WBC
PLATELET # BLD AUTO: 259 K/UL (ref 150–450)
PMV BLD AUTO: 10.7 FL (ref 9.2–12.9)
POTASSIUM SERPL-SCNC: 4.1 MMOL/L (ref 3.5–5.1)
PROT SERPL-MCNC: 7.1 G/DL (ref 6–8.4)
RBC # BLD AUTO: 5.29 M/UL (ref 4.6–6.2)
SODIUM SERPL-SCNC: 136 MMOL/L (ref 136–145)
TRIGL SERPL-MCNC: 108 MG/DL (ref 30–150)
TSH SERPL DL<=0.005 MIU/L-ACNC: 1.39 UIU/ML (ref 0.4–4)
WBC # BLD AUTO: 9.05 K/UL (ref 3.9–12.7)

## 2024-05-30 PROCEDURE — 80061 LIPID PANEL: CPT | Performed by: FAMILY MEDICINE

## 2024-05-30 PROCEDURE — 85025 COMPLETE CBC W/AUTO DIFF WBC: CPT | Performed by: FAMILY MEDICINE

## 2024-05-30 PROCEDURE — 84443 ASSAY THYROID STIM HORMONE: CPT | Performed by: FAMILY MEDICINE

## 2024-05-30 PROCEDURE — 83036 HEMOGLOBIN GLYCOSYLATED A1C: CPT | Performed by: FAMILY MEDICINE

## 2024-05-30 PROCEDURE — 80053 COMPREHEN METABOLIC PANEL: CPT | Performed by: FAMILY MEDICINE

## 2024-05-30 PROCEDURE — 36415 COLL VENOUS BLD VENIPUNCTURE: CPT | Mod: PO | Performed by: FAMILY MEDICINE

## 2024-05-30 PROCEDURE — 96372 THER/PROPH/DIAG INJ SC/IM: CPT | Mod: S$GLB,,, | Performed by: FAMILY MEDICINE

## 2024-05-30 PROCEDURE — 99999 PR PBB SHADOW E&M-EST. PATIENT-LVL II: CPT | Mod: PBBFAC,,,

## 2024-05-30 RX ADMIN — TESTOSTERONE CYPIONATE 400 MG: 200 INJECTION, SOLUTION INTRAMUSCULAR at 02:05

## 2024-05-30 NOTE — PROGRESS NOTES
Depotestosterone 400 mg ( 2 ml ) given IM in left ventral gluteus  Lot #: [id number]   Exp: 02/28/25    NDC #: 01426-473-31  Manufactory: [employer]ida    Pt waited 15 minutes without a reaction notices

## 2024-05-31 RX ORDER — HYDROCODONE BITARTRATE AND ACETAMINOPHEN 5; 325 MG/1; MG/1
1 TABLET ORAL EVERY 6 HOURS PRN
Qty: 21 TABLET | Refills: 0 | Status: SHIPPED | OUTPATIENT
Start: 2024-05-31 | End: 2024-06-12

## 2024-06-10 ENCOUNTER — OFFICE VISIT (OUTPATIENT)
Dept: INTERNAL MEDICINE | Facility: CLINIC | Age: 49
End: 2024-06-10
Payer: COMMERCIAL

## 2024-06-10 VITALS
HEART RATE: 94 BPM | TEMPERATURE: 97 F | DIASTOLIC BLOOD PRESSURE: 76 MMHG | HEIGHT: 71 IN | BODY MASS INDEX: 35.65 KG/M2 | OXYGEN SATURATION: 98 % | WEIGHT: 254.63 LBS | SYSTOLIC BLOOD PRESSURE: 124 MMHG

## 2024-06-10 DIAGNOSIS — E66.01 SEVERE OBESITY (BMI 35.0-39.9) WITH COMORBIDITY: ICD-10-CM

## 2024-06-10 DIAGNOSIS — E34.9 TESTOSTERONE DEFICIENCY: ICD-10-CM

## 2024-06-10 DIAGNOSIS — K76.0 HEPATIC STEATOSIS: ICD-10-CM

## 2024-06-10 DIAGNOSIS — E78.5 HYPERLIPIDEMIA, UNSPECIFIED HYPERLIPIDEMIA TYPE: ICD-10-CM

## 2024-06-10 DIAGNOSIS — E11.9 TYPE 2 DIABETES MELLITUS WITHOUT COMPLICATION, WITHOUT LONG-TERM CURRENT USE OF INSULIN: Primary | ICD-10-CM

## 2024-06-10 PROCEDURE — 3051F HG A1C>EQUAL 7.0%<8.0%: CPT | Mod: CPTII,S$GLB,, | Performed by: FAMILY MEDICINE

## 2024-06-10 PROCEDURE — G2211 COMPLEX E/M VISIT ADD ON: HCPCS | Mod: S$GLB,,, | Performed by: FAMILY MEDICINE

## 2024-06-10 PROCEDURE — 1159F MED LIST DOCD IN RCRD: CPT | Mod: CPTII,S$GLB,, | Performed by: FAMILY MEDICINE

## 2024-06-10 PROCEDURE — 3074F SYST BP LT 130 MM HG: CPT | Mod: CPTII,S$GLB,, | Performed by: FAMILY MEDICINE

## 2024-06-10 PROCEDURE — 3078F DIAST BP <80 MM HG: CPT | Mod: CPTII,S$GLB,, | Performed by: FAMILY MEDICINE

## 2024-06-10 PROCEDURE — 3008F BODY MASS INDEX DOCD: CPT | Mod: CPTII,S$GLB,, | Performed by: FAMILY MEDICINE

## 2024-06-10 PROCEDURE — 99214 OFFICE O/P EST MOD 30 MIN: CPT | Mod: S$GLB,,, | Performed by: FAMILY MEDICINE

## 2024-06-10 PROCEDURE — 99999 PR PBB SHADOW E&M-EST. PATIENT-LVL IV: CPT | Mod: PBBFAC,,, | Performed by: FAMILY MEDICINE

## 2024-06-10 RX ORDER — SEMAGLUTIDE 2.68 MG/ML
2 INJECTION, SOLUTION SUBCUTANEOUS
Qty: 3 ML | Refills: 11 | Status: SHIPPED | OUTPATIENT
Start: 2024-06-10 | End: 2025-06-10

## 2024-06-10 NOTE — PROGRESS NOTES
Subjective:      Patient ID: Kendrick Nava is a 48 y.o. male.    Chief Complaint: Follow-up      Patient here for routine follow up on diabetes, hyperlipidemia, hypogonadism, fatty liver. Reviewed labs drawn recently today with the patient.   A1c is slightly increased at 7.3.  He reports he has a long commute-eats candy while driving to stay awake  Lipids at goal.  Blood pressure controlled today.  Kidney function is stable.  LFTs mildly elevated-imaging from 2018 does show fatty liver.    Follow-up  Pertinent negatives include no abdominal pain.     Review of Systems   Constitutional:  Negative for activity change, appetite change and unexpected weight change.   Respiratory:  Negative for shortness of breath.    Cardiovascular:  Negative for leg swelling.   Gastrointestinal:  Negative for abdominal pain.     Past Medical History:   Diagnosis Date    Diabetes mellitus, type 2     Hyperlipidemia     Testosterone deficiency     LOW Testosterone          Past Surgical History:   Procedure Laterality Date    EPIDURAL STEROID INJECTION N/A 2/2/2023    Procedure: Lumbar L5/S1 IL JONATAN with right paramedian approach RN IV Sedation;  Surgeon: Nico Angeles MD;  Location: [facility];  Service: Pain Management;  Laterality: N/A;    SELECTIVE INJECTION OF ANESTHETIC AGENT AROUND LUMBAR SPINAL NERVE ROOT BY TRANSFORAMINAL APPROACH Bilateral 4/4/2023    Procedure: Bilateral L5/S1 TF JONATAN;  Surgeon: Nico Angeles MD;  Location: [facility];  Service: Pain Management;  Laterality: Bilateral;    SELECTIVE INJECTION OF ANESTHETIC AGENT AROUND LUMBAR SPINAL NERVE ROOT BY TRANSFORAMINAL APPROACH Bilateral 12/7/2023    Procedure: Bilateral L5/S1 TF JONATAN;  Surgeon: Nico Angeles MD;  Location: [facility];  Service: Pain Management;  Laterality: Bilateral;    SELECTIVE INJECTION OF ANESTHETIC AGENT AROUND LUMBAR SPINAL NERVE ROOT BY TRANSFORAMINAL APPROACH Bilateral 3/21/2024    Procedure: Bilateral L5/S1 TF JONATAN;   "Surgeon: Nico Angeles MD;  Location: [facility];  Service: Pain Management;  Laterality: Bilateral;     Family History   Problem Relation Name Age of Onset    Diabetes type II Mother      Diabetes type II Father      Hypertension Father      Diabetes type II Brother      Cancer Maternal Grandmother      Diabetes Maternal Grandfather      No Known Problems Paternal Grandmother      Heart attack Paternal Grandfather       Social History     Socioeconomic History    Marital status:    Tobacco Use    Smoking status: Some Days     Types: Vaping with nicotine    Smokeless tobacco: Never   Substance and Sexual Activity    Alcohol use: Yes     Alcohol/week: 1.0 standard drink of alcohol     Types: 1 Shots of liquor per week     Comment: once a month    Drug use: Never    Sexual activity: Yes     Partners: Male     Review of patient's allergies indicates:  No Known Allergies    Objective:       /76 (BP Location: Left arm, Patient Position: Sitting, BP Method: Large (Manual))   Pulse 94   Temp 96.7 °F (35.9 °C) (Tympanic)   Ht 5' 11" (1.803 m)   Wt 115.5 kg (254 lb 10.1 oz)   SpO2 98%   BMI 35.51 kg/m²   Physical Exam  Constitutional:       General: He is not in acute distress.     Appearance: Normal appearance. He is well-developed. He is not ill-appearing or diaphoretic.   Cardiovascular:      Rate and Rhythm: Normal rate and regular rhythm.      Heart sounds: Normal heart sounds.   Pulmonary:      Effort: Pulmonary effort is normal.      Breath sounds: Normal breath sounds.   Neurological:      General: No focal deficit present.      Mental Status: He is alert and oriented to person, place, and time.   Psychiatric:         Mood and Affect: Mood normal.         Behavior: Behavior normal.         Thought Content: Thought content normal.         Judgment: Judgment normal.         Protective Sensation (w/ 10 gram monofilament):  Right: Intact  Left: Intact    Visual Inspection:  Normal -  " Bilateral    Pedal Pulses:   Right: Present  Left: Present    Posterior Tibialis Pulses:   Right:Present  Left: Present    Assessment:     1. Type 2 diabetes mellitus without complication, without long-term current use of insulin    2. Testosterone deficiency    3. Hyperlipidemia, unspecified hyperlipidemia type    4. Hepatic steatosis    5. Severe obesity (BMI 35.0-39.9) with comorbidity      Plan:   Type 2 diabetes mellitus without complication, without long-term current use of insulin  -     semaglutide (OZEMPIC) 2 mg/dose (8 mg/3 mL) PnIj; Inject 2 mg into the skin every 7 days.  Dispense: 3 mL; Refill: 11  -     Hemoglobin A1C; Future; Expected date: 09/08/2024  -     Comprehensive Metabolic Panel; Future; Expected date: 09/08/2024  -     Testosterone; Future; Expected date: 09/08/2024    Testosterone deficiency    Hyperlipidemia, unspecified hyperlipidemia type    Hepatic steatosis    Severe obesity (BMI 35.0-39.9) with comorbidity    Will increase dose of Ozempic to 2 mg weekly  Continue all other current medications.   Above labs in 3 months prior to visit with me.        I spent a total of 30 minutes face to face and non-face to face on the date of this visit.This includes time preparing to see the patient (eg, review of tests, notes), obtaining and/or reviewing additional history from an independent historian and/or outside medical records, documenting clinical information in the electronic health record, independently interpreting results and/or communicating results to the patient/family/caregiver, or care coordinator.  Visit today included increased complexity associated with the care of the episodic problem addressed and managing the longitudinal care of the patient due to the serious and/or complex managed problem(s).        Medication List with Changes/Refills   New Medications    SEMAGLUTIDE (OZEMPIC) 2 MG/DOSE (8 MG/3 ML) PNIJ    Inject 2 mg into the skin every 7 days.   Current Medications     CELECOXIB (CELEBREX) 200 MG CAPSULE    TAKE 1 CAPSULE(200 MG) BY MOUTH TWICE DAILY    CYCLOBENZAPRINE (FLEXERIL) 10 MG TABLET    Take 1 tablet (10 mg total) by mouth every evening.    GABAPENTIN (NEURONTIN) 600 MG TABLET    Take 2 tablets (1,200 mg total) by mouth 2 (two) times daily.    HYDROCODONE-ACETAMINOPHEN (NORCO) 5-325 MG PER TABLET    Take 1 tablet by mouth every 6 (six) hours as needed for Pain.    METFORMIN (GLUCOPHAGE-XR) 750 MG ER 24HR TABLET    Take two tablets with breakfast and one in the evenings.    ROSUVASTATIN (CRESTOR) 20 MG TABLET    TAKE 1 TABLET(20 MG) BY MOUTH EVERY DAY    SEMAGLUTIDE (OZEMPIC) 1 MG/DOSE (4 MG/3 ML)    Inject 1 mg into the skin every 7 days.   Discontinued Medications    JARDIANCE 25 MG TABLET    Take 25 mg by mouth.

## 2024-06-12 ENCOUNTER — TELEPHONE (OUTPATIENT)
Dept: INTERNAL MEDICINE | Facility: CLINIC | Age: 49
End: 2024-06-12
Payer: COMMERCIAL

## 2024-06-12 ENCOUNTER — CLINICAL SUPPORT (OUTPATIENT)
Dept: INTERNAL MEDICINE | Facility: CLINIC | Age: 49
End: 2024-06-12
Payer: COMMERCIAL

## 2024-06-12 ENCOUNTER — OFFICE VISIT (OUTPATIENT)
Dept: PAIN MEDICINE | Facility: CLINIC | Age: 49
End: 2024-06-12
Payer: COMMERCIAL

## 2024-06-12 VITALS
BODY MASS INDEX: 36.01 KG/M2 | DIASTOLIC BLOOD PRESSURE: 82 MMHG | HEART RATE: 82 BPM | HEIGHT: 71 IN | SYSTOLIC BLOOD PRESSURE: 133 MMHG | WEIGHT: 257.19 LBS

## 2024-06-12 DIAGNOSIS — M54.50 CHRONIC BILATERAL LOW BACK PAIN WITHOUT SCIATICA: ICD-10-CM

## 2024-06-12 DIAGNOSIS — E34.9 TESTOSTERONE DEFICIENCY: Primary | ICD-10-CM

## 2024-06-12 DIAGNOSIS — M79.18 MYALGIA, OTHER SITE: ICD-10-CM

## 2024-06-12 DIAGNOSIS — M51.36 DDD (DEGENERATIVE DISC DISEASE), LUMBAR: ICD-10-CM

## 2024-06-12 DIAGNOSIS — M47.816 LUMBAR SPONDYLOSIS: ICD-10-CM

## 2024-06-12 DIAGNOSIS — G89.29 CHRONIC BILATERAL LOW BACK PAIN WITHOUT SCIATICA: ICD-10-CM

## 2024-06-12 DIAGNOSIS — M54.16 LUMBAR RADICULOPATHY: ICD-10-CM

## 2024-06-12 DIAGNOSIS — M48.062 SPINAL STENOSIS OF LUMBAR REGION WITH NEUROGENIC CLAUDICATION: Primary | ICD-10-CM

## 2024-06-12 PROCEDURE — 99214 OFFICE O/P EST MOD 30 MIN: CPT | Mod: S$GLB,,, | Performed by: PHYSICAL MEDICINE & REHABILITATION

## 2024-06-12 PROCEDURE — 3008F BODY MASS INDEX DOCD: CPT | Mod: CPTII,S$GLB,, | Performed by: PHYSICAL MEDICINE & REHABILITATION

## 2024-06-12 PROCEDURE — 3075F SYST BP GE 130 - 139MM HG: CPT | Mod: CPTII,S$GLB,, | Performed by: PHYSICAL MEDICINE & REHABILITATION

## 2024-06-12 PROCEDURE — G2211 COMPLEX E/M VISIT ADD ON: HCPCS | Mod: S$GLB,,, | Performed by: PHYSICAL MEDICINE & REHABILITATION

## 2024-06-12 PROCEDURE — 3051F HG A1C>EQUAL 7.0%<8.0%: CPT | Mod: CPTII,S$GLB,, | Performed by: PHYSICAL MEDICINE & REHABILITATION

## 2024-06-12 PROCEDURE — 3079F DIAST BP 80-89 MM HG: CPT | Mod: CPTII,S$GLB,, | Performed by: PHYSICAL MEDICINE & REHABILITATION

## 2024-06-12 PROCEDURE — 96372 THER/PROPH/DIAG INJ SC/IM: CPT | Mod: S$GLB,,, | Performed by: FAMILY MEDICINE

## 2024-06-12 PROCEDURE — 1159F MED LIST DOCD IN RCRD: CPT | Mod: CPTII,S$GLB,, | Performed by: PHYSICAL MEDICINE & REHABILITATION

## 2024-06-12 PROCEDURE — 99999 PR PBB SHADOW E&M-EST. PATIENT-LVL III: CPT | Mod: PBBFAC,,, | Performed by: PHYSICAL MEDICINE & REHABILITATION

## 2024-06-12 PROCEDURE — 99999 PR PBB SHADOW E&M-EST. PATIENT-LVL II: CPT | Mod: PBBFAC,,,

## 2024-06-12 RX ORDER — CYCLOBENZAPRINE HCL 10 MG
10 TABLET ORAL NIGHTLY
Qty: 90 TABLET | Refills: 3 | Status: SHIPPED | OUTPATIENT
Start: 2024-06-12

## 2024-06-12 RX ORDER — TESTOSTERONE CYPIONATE 200 MG/ML
400 INJECTION, SOLUTION INTRAMUSCULAR
Status: ACTIVE | OUTPATIENT
Start: 2024-06-12 | End: 2024-11-27

## 2024-06-12 RX ORDER — HYDROCODONE BITARTRATE AND ACETAMINOPHEN 5; 325 MG/1; MG/1
1 TABLET ORAL EVERY 12 HOURS PRN
Qty: 45 TABLET | Refills: 0 | Status: SHIPPED | OUTPATIENT
Start: 2024-06-12 | End: 2024-07-12

## 2024-06-12 RX ORDER — HYDROCODONE BITARTRATE AND ACETAMINOPHEN 5; 325 MG/1; MG/1
1 TABLET ORAL EVERY 12 HOURS PRN
Qty: 45 TABLET | Refills: 0 | Status: SHIPPED | OUTPATIENT
Start: 2024-08-07 | End: 2024-09-06

## 2024-06-12 RX ORDER — HYDROCODONE BITARTRATE AND ACETAMINOPHEN 5; 325 MG/1; MG/1
1 TABLET ORAL EVERY 12 HOURS PRN
Qty: 45 TABLET | Refills: 0 | Status: SHIPPED | OUTPATIENT
Start: 2024-07-10 | End: 2024-08-09

## 2024-06-12 RX ADMIN — TESTOSTERONE CYPIONATE 400 MG: 200 INJECTION, SOLUTION INTRAMUSCULAR at 10:06

## 2024-06-12 NOTE — PROGRESS NOTES
Patient came into clinic today for DepoTestosterone 400mg as ordered by . Pt instructed to wait 15 minutes following injection for possible reaction. Pt verbalized understanding and tolerated injection well.

## 2024-06-12 NOTE — PROGRESS NOTES
Est Patient Chronic Pain Note (Follow up Visit)      Chief Complaint:   Chief Complaint   Patient presents with    Low-back Pain        SUBJECTIVE:    Kendrick Nava is a 48 y.o. male presents today for persistent lower back and leg pain.  He continues to have numbness and tingling in his left lower extremity with weakness with foot dorsiflexion..  He is s/p repeat bilateral L5-S1 TFESI that occurred on 03/21/2024.  He reports that this resulted in 75% relief relief that is waning in relief but still managing it well with current medication regimen of gabapentin, Celebrex, Flexeril, and  occasional use of Norco.  Current pain intensity is 4/10.  He is seeing outside neurosurgeon who discussed potential lumbar spine surgery.         Patient denies night fever/night sweats, urinary incontinence, bowel incontinence, significant weight loss, significant motor weakness, and loss of sensations.    Pain Disability Index Review:      6/12/2024     8:51 AM 1/24/2024    11:42 AM 11/13/2023     8:57 AM   Last 3 PDI Scores   Pain Disability Index (PDI) 35 38 42           Interval HPI 01/24/2024:  Kendrick Nava is a 48 y.o. male presents today for persistent lower back and leg pain.  He continues to have numbness and tingling in his left lower extremity with weakness with foot dorsiflexion..  He is s/p bilateral L5-S1 TFESI that occurred on 12/07/2023.  He reports that this resulted in 70% relief that is waning in relief.  Current pain intensity is 4/10.  He is seeing outside neurosurgeon who discussed potential lumbar spine surgery, but ultimately left it up to the patient for which the patient was unsure of what he would like to do at this time.    Interval HPI 11/13/2023:  Kendrick Nava is a 48 y.o. male presents today for follow-up chronic lower back pain.   He reports that he has had severe worsening lower back pain and left lower extremity pain with weakness.  He reports that he had a fall off of a ladder due to  this weakness, but denies any injuries prior to the initiation of this pain flare.  Current pain intensity is 6/10, but states it can increase to 10/10 at its worst.     Interval HPI 10/18/2023:  Kendrick Nava is a 47 y.o. male presents today for follow-up chronic lower back pain.  Current pain intensity is 4/10.  Reports that his pain is stable with use of gabapentin, Celebrex, Flexeril and Norco p.r.n..  He denies any adverse reactions to these medications he reports his pain is well managed with these medications.  He is able to complete his daily task and labor intensive job without any significant issues.    Interval History (7/18/2023):  Kendrick Nava presents today for follow-up visit.  Patient was last seen on 5/2/2023. He reports his leg pain is well-controlled with Gabapentin, he takes 900 mg in the morning and 900 mg at night. Continues to take Celebrex daily and Flexeril nightly with good relief, Norco sparingly for severe. Pain. Reports right sided pain in his lower back that wraps around into his stomach x 3 weeks. Patient reports pain as 4/10 today.    Interval History (5/1/2023): Kendrick Nava presents today for follow-up visit.  he underwent bilateral L5/S1 TF JONATAN on 4/4/23.  The patient reports that he is/was better following the procedure.  he reports initially experienced worsening pain for about a week after the procedure, followed by  95% pain relief x 1 week before relief dwindled to 20%. Reports continued pressure in his lower back and pain across the lumbosacral region in a band like distribution with radiation into his right lower extremity along L5/S1 distribution. Takes Gabapentin and Flexeril nightly with good relief, no side effects. Norco for severe pain.  Patient reports pain as 6/10 today.    Interval HPI 03/13/2023  Kendrick Nava is a 47 y.o. male presents today for injection follow-up.  He was last seen for L5-S1 IL JONATAN on 02/02/2023.  He reports that this resulted in  80+ % relief for the initial 2 weeks, but now has decreased to about 50-60% relief.  He feels that the pain is of the same type and quality and is in the same location..    Interval History (1/13/2023):  Kendrick Nava presents today for follow-up visit.  Patient was last seen on Visit date not found. Patient reports pain as 5/10 today.  He has been performing physician directed exercises targeting sciatic nerve pain for several weeks without improvement. He reports continued pain in his thoracolumbar region but his worst pain is persistently along his lumbosacral region in a band-like distribution with radiation into his right lower extremity along L5/S1 distribution. Reports occasional radiation into his left leg. He installs italo for a living and reports prolonged bending and stooping worsens his symptoms. He reports by the end of the day he walks with a limp.      Initial HPI 08/29/2022  Kendrick Nava is a 47 y.o. male who presents to the clinic for the evaluation of back pain.  He was referred by his primary care provider for further evaluation and management of this pain.  The pain started a few years ago following a fall from a ladder in 2018 and symptoms have been unchanged.The pain is located in the right thoracolumbar area and radiates to the lumbosacral region with occasional radiation into the left lower extremity extending to the foot and ankle.  The pain is described as aching, numbness, tingling burning and is rated as 6/10. The pain is rated with a score of  4/10 on the BEST day and a score of 8/10 on the WORST day.  Symptoms interfere with daily activity. The pain is exacerbated by work activities, prolonged standing or sitting.  The pain is mitigated by activity modification.       Non-Pharmacologic Treatments:  Physical Therapy/Home Exercise: yes  Ice/Heat:yes  TENS: no  Acupuncture: no  Massage: yes  Chiropractic: yes    Other: no      Pain Medications:  - Opioids:  Tramadol  - Adjuvant  Medications:  Mobic  - Anti-Coagulants:  None     report:  Reviewed and consistent with medication use as prescribed.      Pain Procedures:   -12/07/2023:  Bilateral L5-S1 TFESI, 70% relief overall  -02/02/2023:  L5-S1 IL JONATAN, 80% relief for the initial 2 weeks with continued 50-60% relief  - bilateral L5/S1 TF JONATAN on 4/4/23 with 95% relief x 1 week, then 20% relief  -bilateral L5/S1 TFESI on 12/07/2023, 70% relief  - bilateral L5-S1 TFESI on 03/21/2024, 75% relief    Imaging:   Outside CT and MRI of the thoracic lumbar spine reviewed 2018    MRI lumbar spine 11/17/2023:  There are 5 non-rib-bearing lumbar vertebrae.  Alignment is unremarkable with no significant listhesis.  No acute fracture or compression deformity.  No aggressive focal signal abnormality.     Conus medullaris terminates at the L1 level.  Conus medullaris is normal in size and signal.     T12-L1: Mild disc desiccation and trace bulge.  No significant spinal canal or neural foraminal stenosis.     L1-L2: Mild disc desiccation.  No significant spinal canal or neural foraminal stenosis.     L2-L3: Mild disc desiccation and trace bulge.  No significant spinal canal or neural foraminal stenosis.     L3-L4: Disc desiccation and small bulge.  Mild bilateral facet arthropathy.  No significant spinal canal stenosis.  Mild bilateral neural foraminal stenosis.     L4-L5: Disc desiccation and asymmetric to the left disc bulge.  Mild bilateral facet arthropathy.  No significant spinal canal stenosis.  Moderate left and mild right neural foraminal stenosis.     L5-S1: Disc desiccation and asymmetric to the right disc bulge.  Mild bilateral facet arthropathy.  No significant spinal canal stenosis although there is encroachment on the right lateral recess and descending right S1 nerve root.  No significant neural foraminal stenosis.     Paraspinal soft tissues are unremarkable.  Visualized intra-abdominal and pelvic contents are also unremarkable.    CT lumbar  spine 12/02/2022     FINDINGS:  No acute fracture or dislocation.  Moderate disc height loss T11-12 and T12-L1.  Mild disc height loss at L1-2 through L4-5.  Mild facet arthropathy.  No significant endplate sclerosis.  Mild Schmorl's node formation T12-L1 and L1-2.     SI joints unremarkable.  Mild aortoiliac atherosclerotic calcification.     Impression:     No acute abnormality identified.  Chronic mild discogenic degenerative change as described above    CT lumbar spine 07/22/2018  1.  Acute, traumatic fractures involving the left L2, left L3, and left L4 transverse processes.     2.  Multilevel lumbar degenerative disc disease, spondylosis, and stenoses, as discussed above. This is probably most prominent at L5-S1 on the right, with there is mass effect on and posterior displacement of the right S1 nerve root within the lateral recess, with right lateral recess stenosis.    X-ray lumbar spine 12/02/2021:  The vertebral bodies of the lumbar spine demonstrate a normal height and alignment.  There appears to be mild chronic vertebral body height loss at the T11 and T12 levels with some spondylosis noted at these levels.  The disc space heights appear to be relatively well maintained.  Mild spondylosis noted anteriorly at the L3-4 and to a lesser degree the L4-5 levels.  No pars defects are noted.       Past Medical History:   Diagnosis Date    Diabetes mellitus, type 2     Hyperlipidemia     Testosterone deficiency     LOW Testosterone     Past Surgical History:   Procedure Laterality Date    EPIDURAL STEROID INJECTION N/A 2/2/2023    Procedure: Lumbar L5/S1 IL JONATAN with right paramedian approach RN IV Sedation;  Surgeon: Nico Angeles MD;  Location: [facility];  Service: Pain Management;  Laterality: N/A;    SELECTIVE INJECTION OF ANESTHETIC AGENT AROUND LUMBAR SPINAL NERVE ROOT BY TRANSFORAMINAL APPROACH Bilateral 4/4/2023    Procedure: Bilateral L5/S1 TF JONATAN;  Surgeon: Nico Angeles MD;  Location: [facility]  PAIN MGT;  Service: Pain Management;  Laterality: Bilateral;    SELECTIVE INJECTION OF ANESTHETIC AGENT AROUND LUMBAR SPINAL NERVE ROOT BY TRANSFORAMINAL APPROACH Bilateral 12/7/2023    Procedure: Bilateral L5/S1 TF JONATAN;  Surgeon: Nico Angeles MD;  Location: [facility] PAIN [facility];  Service: Pain Management;  Laterality: Bilateral;    SELECTIVE INJECTION OF ANESTHETIC AGENT AROUND LUMBAR SPINAL NERVE ROOT BY TRANSFORAMINAL APPROACH Bilateral 3/21/2024    Procedure: Bilateral L5/S1 TF JONATAN;  Surgeon: Nico Angeles MD;  Location: [facility]IN [facility];  Service: Pain Management;  Laterality: Bilateral;     Social History     Socioeconomic History    Marital status:    Tobacco Use    Smoking status: Some Days     Types: Vaping with nicotine    Smokeless tobacco: Never   Substance and Sexual Activity    Alcohol use: Yes     Alcohol/week: 1.0 standard drink of alcohol     Types: 1 Shots of liquor per week     Comment: once a month    Drug use: Never    Sexual activity: Yes     Partners: Male     Family History   Problem Relation Name Age of Onset    Diabetes type II Mother      Diabetes type II Father      Hypertension Father      Diabetes type II Brother      Cancer Maternal Grandmother      Diabetes Maternal Grandfather      No Known Problems Paternal Grandmother      Heart attack Paternal Grandfather         Review of patient's allergies indicates:  No Known Allergies      Current Outpatient Medications   Medication Sig    celecoxib (CELEBREX) 200 MG capsule TAKE 1 CAPSULE(200 MG) BY MOUTH TWICE DAILY    gabapentin (NEURONTIN) 600 MG tablet Take 2 tablets (1,200 mg total) by mouth 2 (two) times daily.    metFORMIN (GLUCOPHAGE-XR) 750 MG ER 24hr tablet Take two tablets with breakfast and one in the evenings.    rosuvastatin (CRESTOR) 20 MG tablet TAKE 1 TABLET(20 MG) BY MOUTH EVERY DAY    semaglutide (OZEMPIC) 1 mg/dose (4 mg/3 mL) Inject 1 mg into the skin every 7 days.    semaglutide (OZEMPIC) 2 mg/dose (8 mg/3 mL)  "PnIj Inject 2 mg into the skin every 7 days.    cyclobenzaprine (FLEXERIL) 10 MG tablet Take 1 tablet (10 mg total) by mouth every evening.    HYDROcodone-acetaminophen (NORCO) 5-325 mg per tablet Take 1 tablet by mouth every 12 (twelve) hours as needed for Pain.    [START ON 7/10/2024] HYDROcodone-acetaminophen (NORCO) 5-325 mg per tablet Take 1 tablet by mouth every 12 (twelve) hours as needed for Pain.    [START ON 8/7/2024] HYDROcodone-acetaminophen (NORCO) 5-325 mg per tablet Take 1 tablet by mouth every 12 (twelve) hours as needed for Pain.     No current facility-administered medications for this visit.       Review of Systems     GENERAL:  No weight loss, malaise or fevers.  HEENT:   No recent changes in vision or hearing  NECK:  Negative for lumps, no difficulty with swallowing.  RESPIRATORY:  Negative for cough, wheezing or shortness of breath, patient denies any recent URI.  CARDIOVASCULAR:  Negative for chest pain, leg swelling or palpitations.  GI:  Negative for abdominal discomfort, blood in stools or black stools or change in bowel habits.  MUSCULOSKELETAL:  See HPI.  SKIN:  Negative for lesions, rash, and itching.  PSYCH:  No mood disorder or recent psychosocial stressors.  Patients sleep is not disturbed secondary to pain.  HEMATOLOGY/LYMPHOLOGY:  Negative for prolonged bleeding, bruising easily or swollen nodes.  Patient is not currently taking any anti-coagulants  NEURO:   No history of headaches, syncope, paralysis, seizures or tremors.  All other reviewed and negative other than HPI.    OBJECTIVE:    /82 (BP Location: Right arm, Patient Position: Sitting)   Pulse 82   Ht 5' 11" (1.803 m)   Wt 116.7 kg (257 lb 2.7 oz)   BMI 35.87 kg/m²         Physical Exam    GENERAL: Well appearing, in no acute distress, alert and oriented x3.  PSYCH:  Mood and affect appropriate.  SKIN: Skin color, texture, turgor normal, no rashes or lesions.  HEAD/FACE:  Normocephalic, atraumatic. Cranial nerves " grossly intact.  CV: RRR with palpation of the radial artery.  PULM: No evidence of respiratory difficulty, symmetric chest rise.  GI:  Soft and non-tender, negative heredia's sign, negative Mcburney's sign, negative CVA tenderness  BACK: Straight leg raising in the sitting and supine positions is positive to radicular pain on the right, equivocal to radicular pain on the left.  Moderate pain to palpation over the facet joints of the lumbar spine and lumbar paraspinals, mostly on the left   Limited range of motion secondary to pain reproduction.  EXTREMITIES: No deformities, edema, or skin discoloration. Good capillary refill.  MUSCULOSKELETAL:  Hip and knee provocative maneuvers are negative.  There is no pain with palpation over the sacroiliac joints bilaterally. Moderated tenderness along right quadratus lumborum. FABERs test is negative.  FADIRs test is negative.   Bilateral upper and lower extremity strength is normal and symmetric with the exception of left-sided footdrop at 3/5.  No atrophy or tone abnormalities are noted.  NEURO: Bilateral upper and lower extremity coordination and muscle stretch reflexes are physiologic and symmetric.  Plantar response are downgoing. No clonus.  No loss of sensation is noted.  GAIT:  slow, antalgic, steppage gait.      LABS:  Lab Results   Component Value Date    WBC 9.05 05/30/2024    HGB 15.0 05/30/2024    HCT 45.3 05/30/2024    MCV 86 05/30/2024     05/30/2024       CMP  Sodium   Date Value Ref Range Status   05/30/2024 136 136 - 145 mmol/L Final     Potassium   Date Value Ref Range Status   05/30/2024 4.1 3.5 - 5.1 mmol/L Final     Chloride   Date Value Ref Range Status   05/30/2024 99 95 - 110 mmol/L Final     CO2   Date Value Ref Range Status   05/30/2024 25 23 - 29 mmol/L Final     Glucose   Date Value Ref Range Status   05/30/2024 121 (H) 70 - 110 mg/dL Final     BUN   Date Value Ref Range Status   05/30/2024 17 6 - 20 mg/dL Final     Creatinine   Date Value  Ref Range Status   05/30/2024 1.0 0.5 - 1.4 mg/dL Final     Calcium   Date Value Ref Range Status   05/30/2024 9.3 8.7 - 10.5 mg/dL Final     Total Protein   Date Value Ref Range Status   05/30/2024 7.1 6.0 - 8.4 g/dL Final     Albumin   Date Value Ref Range Status   05/30/2024 4.4 3.5 - 5.2 g/dL Final   11/04/2022 4.8 3.6 - 5.1 g/dL Final     Total Bilirubin   Date Value Ref Range Status   05/30/2024 0.6 0.1 - 1.0 mg/dL Final     Comment:     For infants and newborns, interpretation of results should be based  on gestational age, weight and in agreement with clinical  observations.    Premature Infant recommended reference ranges:  Up to 24 hours.............<8.0 mg/dL  Up to 48 hours............<12.0 mg/dL  3-5 days..................<15.0 mg/dL  6-29 days.................<15.0 mg/dL       Alkaline Phosphatase   Date Value Ref Range Status   05/30/2024 53 (L) 55 - 135 U/L Final     AST   Date Value Ref Range Status   05/30/2024 47 (H) 10 - 40 U/L Final     ALT   Date Value Ref Range Status   05/30/2024 45 (H) 10 - 44 U/L Final     Anion Gap   Date Value Ref Range Status   05/30/2024 12 8 - 16 mmol/L Final     eGFR if    Date Value Ref Range Status   06/06/2022 >60.0 >60 mL/min/1.73 m^2 Final     eGFR if non    Date Value Ref Range Status   06/06/2022 >60.0 >60 mL/min/1.73 m^2 Final     Comment:     Calculation used to obtain the estimated glomerular filtration  rate (eGFR) is the CKD-EPI equation.          Lab Results   Component Value Date    HGBA1C 7.3 (H) 05/30/2024             ASSESSMENT: 48 y.o. year old male with chronic lower back pain, consistent with     1. Spinal stenosis of lumbar region with neurogenic claudication        2. Lumbar radiculopathy  cyclobenzaprine (FLEXERIL) 10 MG tablet    HYDROcodone-acetaminophen (NORCO) 5-325 mg per tablet    HYDROcodone-acetaminophen (NORCO) 5-325 mg per tablet    HYDROcodone-acetaminophen (NORCO) 5-325 mg per tablet      3. Chronic  bilateral low back pain without sciatica  cyclobenzaprine (FLEXERIL) 10 MG tablet      4. Lumbar spondylosis  HYDROcodone-acetaminophen (NORCO) 5-325 mg per tablet    HYDROcodone-acetaminophen (NORCO) 5-325 mg per tablet    HYDROcodone-acetaminophen (NORCO) 5-325 mg per tablet      5. DDD (degenerative disc disease), lumbar        6. Myalgia, other site                    PLAN:   - Interventions:    Advised patient he can message the clinic whenever you would like to schedule another JONATAN    S/p repeat bilateral L5-S1 TFESI on 03/21/2024, 75% relief  s/p bilateral L5/S1 TF JONATAN on 12/07/2023 with 70% relief overall  -s/p bilateral L5/S1 TF JONATAN on 4/4/23 with 95% relief x 1 week, then 20% relief  s/p L5/S1 IL JONATAN with 80% relief with the initial 2 weeks.      - Anticoagulation use: no       - Medications: I have stressed the importance of physical activity and a home exercise plan to help with pain and improve health. and Patient can continue with medications for now since they are providing benefits, using them appropriately, and without side effects.     An opioid pain contract was completed on 06/12/2024 after having an extensive conversation about chronic opioid use for pain management. We discussed the risks and benefits of opioids.  I discouraged the patient from escalation of opioid use and try to minimize its use to decrease chance of dependence, tolerance, and opioid-based hyperalgesia.  I reviewed the  in great detail and there are no inconsistencies and it is appropriate with patient's history.  There are no signs of aberrant drug behavior.  The opioid risk tool was also completed.  Pt counseled about the side effects of long term use of opioids including dependence, tolerance, addiction, respiratory depression, somnelence , immune and endocrine dysfunction.  The patient expressed understanding.         Will provide Norco 5/325 mg Q 12 p.r.n., 45 tablets.  Fill dates for 06/12/2024, 07/10/2024, and  08/07/2024  - continue gabapentin 1200 mg BID  -Continue Celebrex 100 mg BID for day time pain/inflammation  -Continue Flexeril 10 mg nightly        - Therapy:  Advised patient continue with activities as tolerated    - Psychological:  Discussed coping mechanisms to help address chronic pain issues    - Labs:  Reviewed    - Imaging: Reviewed available imaging with patient and answered any questions they had regarding study.      - Consults/Referrals:  None at this time    - Records:  Reviewed/Obtain old records from outside physicians and imaging    - Follow up visit:  3 months or as needed    - Counseled patient regarding the importance of activity modification and physical therapy    - This condition does not require this patient to take time off of work, and the primary goal of our Pain Management services is to improve the patient's functional capacity.    - Patient Questions: Answered all of the patient's questions regarding diagnosis, therapy, and treatment        The above plan and management options were discussed at length with patient. Patient is in agreement with the above and verbalized understanding.    Visit today included increased complexity associated with the care of the episodic problem of chronic pain which was addressed and continue to manage the longitudinal care of the patient due to the serious and/or complex managed problem(s) listed above.        Nico Angeles MD  Interventional Pain Management  [facility]    Disclaimer:  This note was prepared using voice recognition system and is likely to have sound alike errors that may have been overlooked even after proof reading.  Please call me with any questions

## 2024-06-26 ENCOUNTER — CLINICAL SUPPORT (OUTPATIENT)
Dept: INTERNAL MEDICINE | Facility: CLINIC | Age: 49
End: 2024-06-26
Payer: COMMERCIAL

## 2024-06-26 DIAGNOSIS — E34.9 TESTOSTERONE DEFICIENCY: Primary | ICD-10-CM

## 2024-06-26 PROCEDURE — 96372 THER/PROPH/DIAG INJ SC/IM: CPT | Mod: S$GLB,,, | Performed by: FAMILY MEDICINE

## 2024-06-26 PROCEDURE — 99999 PR PBB SHADOW E&M-EST. PATIENT-LVL II: CPT | Mod: PBBFAC,,,

## 2024-06-26 RX ADMIN — TESTOSTERONE CYPIONATE 400 MG: 200 INJECTION, SOLUTION INTRAMUSCULAR at 04:06

## 2024-06-26 NOTE — PROGRESS NOTES
Depotestosterone 400 mg ( 2 ml ) given IM in left ventral  Lot #: [id number]    Exp: 06/30/26   NDC #: 9366-0901-02  Manufacotory : [employer]    Pt waited 15 minutes without a reaction notices

## 2024-07-11 ENCOUNTER — CLINICAL SUPPORT (OUTPATIENT)
Dept: INTERNAL MEDICINE | Facility: CLINIC | Age: 49
End: 2024-07-11
Payer: COMMERCIAL

## 2024-07-11 ENCOUNTER — LAB VISIT (OUTPATIENT)
Dept: LAB | Facility: HOSPITAL | Age: 49
End: 2024-07-11
Attending: FAMILY MEDICINE
Payer: COMMERCIAL

## 2024-07-11 ENCOUNTER — PATIENT MESSAGE (OUTPATIENT)
Dept: INTERNAL MEDICINE | Facility: CLINIC | Age: 49
End: 2024-07-11

## 2024-07-11 DIAGNOSIS — E34.9 TESTOSTERONE DEFICIENCY: Primary | ICD-10-CM

## 2024-07-11 DIAGNOSIS — E11.9 TYPE 2 DIABETES MELLITUS WITHOUT COMPLICATION, WITHOUT LONG-TERM CURRENT USE OF INSULIN: ICD-10-CM

## 2024-07-11 LAB
ALBUMIN/CREAT UR: 16.2 UG/MG (ref 0–30)
CREAT UR-MCNC: 260 MG/DL (ref 23–375)
MICROALBUMIN UR DL<=1MG/L-MCNC: 42 UG/ML

## 2024-07-11 PROCEDURE — 96372 THER/PROPH/DIAG INJ SC/IM: CPT | Mod: S$GLB,,, | Performed by: FAMILY MEDICINE

## 2024-07-11 PROCEDURE — 99999 PR PBB SHADOW E&M-EST. PATIENT-LVL II: CPT | Mod: PBBFAC,,,

## 2024-07-11 PROCEDURE — 82570 ASSAY OF URINE CREATININE: CPT | Performed by: FAMILY MEDICINE

## 2024-07-11 RX ADMIN — TESTOSTERONE CYPIONATE 400 MG: 200 INJECTION, SOLUTION INTRAMUSCULAR at 03:07

## 2024-07-11 NOTE — PROGRESS NOTES
Patient came into clinic for DepoTestosterone 400mg as ordered by .   Patient instructed to wait 15 minutes following injection for possible reaction. Pt verbalized understanding and tolerated well.     Lot # [id number]  Expires 2026-06

## 2024-07-20 ENCOUNTER — PATIENT MESSAGE (OUTPATIENT)
Dept: PAIN MEDICINE | Facility: CLINIC | Age: 49
End: 2024-07-20
Payer: COMMERCIAL

## 2024-07-21 ENCOUNTER — PATIENT MESSAGE (OUTPATIENT)
Dept: INTERNAL MEDICINE | Facility: CLINIC | Age: 49
End: 2024-07-21
Payer: COMMERCIAL

## 2024-07-22 RX ORDER — CELECOXIB 200 MG/1
200 CAPSULE ORAL 2 TIMES DAILY
Qty: 60 CAPSULE | Refills: 1 | Status: SHIPPED | OUTPATIENT
Start: 2024-07-22

## 2024-07-22 NOTE — TELEPHONE ENCOUNTER
No care due was identified.  [facility] Embedded Care Due Messages. Reference number: [id number].   7/22/2024 10:10:31 AM CDT

## 2024-07-25 ENCOUNTER — PATIENT MESSAGE (OUTPATIENT)
Dept: INTERNAL MEDICINE | Facility: CLINIC | Age: 49
End: 2024-07-25

## 2024-07-25 ENCOUNTER — CLINICAL SUPPORT (OUTPATIENT)
Dept: INTERNAL MEDICINE | Facility: CLINIC | Age: 49
End: 2024-07-25
Payer: COMMERCIAL

## 2024-07-25 DIAGNOSIS — E34.9 TESTOSTERONE DEFICIENCY: Primary | ICD-10-CM

## 2024-07-25 PROCEDURE — 96372 THER/PROPH/DIAG INJ SC/IM: CPT | Mod: S$GLB,,, | Performed by: FAMILY MEDICINE

## 2024-07-25 RX ADMIN — TESTOSTERONE CYPIONATE 400 MG: 200 INJECTION, SOLUTION INTRAMUSCULAR at 03:07

## 2024-07-25 NOTE — PROGRESS NOTES
Pt requested to have injection to be given on the right  Depotestosterone 400 mg ( 2 ml ) given IM in right ventral gluteus  Lot #: [id number]    Exp: 06/30/26    NDC #: 9995-6460-60  Manufactory: [employer]    Pt waited 15 minutes without a reaction notices

## 2024-07-30 ENCOUNTER — PATIENT MESSAGE (OUTPATIENT)
Dept: ADMINISTRATIVE | Facility: HOSPITAL | Age: 49
End: 2024-07-30
Payer: COMMERCIAL

## 2024-07-31 DIAGNOSIS — Z12.11 SCREENING FOR COLON CANCER: ICD-10-CM

## 2024-08-07 ENCOUNTER — PATIENT MESSAGE (OUTPATIENT)
Dept: INTERNAL MEDICINE | Facility: CLINIC | Age: 49
End: 2024-08-07
Payer: COMMERCIAL

## 2024-08-09 ENCOUNTER — CLINICAL SUPPORT (OUTPATIENT)
Dept: INTERNAL MEDICINE | Facility: CLINIC | Age: 49
End: 2024-08-09
Payer: COMMERCIAL

## 2024-08-09 DIAGNOSIS — E34.9 TESTOSTERONE DEFICIENCY: Primary | ICD-10-CM

## 2024-08-09 PROCEDURE — 99999 PR PBB SHADOW E&M-EST. PATIENT-LVL II: CPT | Mod: PBBFAC,,,

## 2024-08-09 RX ADMIN — TESTOSTERONE CYPIONATE 400 MG: 200 INJECTION, SOLUTION INTRAMUSCULAR at 01:08

## 2024-08-12 RX ORDER — ROSUVASTATIN CALCIUM 20 MG/1
20 TABLET, COATED ORAL
Qty: 90 TABLET | Refills: 3 | Status: SHIPPED | OUTPATIENT
Start: 2024-08-12

## 2024-08-12 NOTE — TELEPHONE ENCOUNTER
No care due was identified.  [facility] Embedded Care Due Messages. Reference number: [id number].   8/11/2024 7:20:50 PM CDT

## 2024-08-12 NOTE — TELEPHONE ENCOUNTER
Kendrick Nava  is requesting a refill authorization.  Brief Assessment and Rationale for Refill:  Approve     Medication Therapy Plan:         Comments:     Note composed:6:03 AM 08/12/2024

## 2024-08-16 ENCOUNTER — PATIENT MESSAGE (OUTPATIENT)
Dept: PAIN MEDICINE | Facility: CLINIC | Age: 49
End: 2024-08-16
Payer: COMMERCIAL

## 2024-08-29 ENCOUNTER — PATIENT MESSAGE (OUTPATIENT)
Dept: INTERNAL MEDICINE | Facility: CLINIC | Age: 49
End: 2024-08-29

## 2024-08-29 ENCOUNTER — CLINICAL SUPPORT (OUTPATIENT)
Dept: INTERNAL MEDICINE | Facility: CLINIC | Age: 49
End: 2024-08-29
Payer: COMMERCIAL

## 2024-08-29 ENCOUNTER — TELEPHONE (OUTPATIENT)
Dept: INTERNAL MEDICINE | Facility: CLINIC | Age: 49
End: 2024-08-29

## 2024-08-29 DIAGNOSIS — E34.9 TESTOSTERONE DEFICIENCY: Primary | ICD-10-CM

## 2024-08-29 DIAGNOSIS — E34.9 TESTOSTERONE DEFICIENCY: ICD-10-CM

## 2024-08-29 PROCEDURE — 99999 PR PBB SHADOW E&M-EST. PATIENT-LVL II: CPT | Mod: PBBFAC,,,

## 2024-08-29 RX ORDER — TESTOSTERONE CYPIONATE 200 MG/ML
400 INJECTION, SOLUTION INTRAMUSCULAR
Status: SHIPPED | OUTPATIENT
Start: 2024-08-29 | End: 2024-11-21

## 2024-08-29 RX ORDER — TESTOSTERONE CYPIONATE 200 MG/ML
400 INJECTION, SOLUTION INTRAMUSCULAR ONCE
Status: CANCELLED | OUTPATIENT
Start: 2024-08-29 | End: 2024-08-29

## 2024-08-29 RX ADMIN — TESTOSTERONE CYPIONATE 400 MG: 200 INJECTION, SOLUTION INTRAMUSCULAR at 03:08

## 2024-08-29 NOTE — TELEPHONE ENCOUNTER
Pt here for DepoTestoserone injection as ordered by  but order is overdue (pt was due 08/21). Needs new order placed for injection to be correct. Please review and advise. Pt here waiting on injection.

## 2024-08-29 NOTE — PROGRESS NOTES
Patient came into clinic for DepoTestosterone 400mg as ordered. Pt instructed to wait 15 minutes following injection for possible reaction. pt verblized understanding and tolerated well.     Lot # [id number]  Expires 2026/06

## 2024-09-12 ENCOUNTER — CLINICAL SUPPORT (OUTPATIENT)
Dept: INTERNAL MEDICINE | Facility: CLINIC | Age: 49
End: 2024-09-12
Payer: COMMERCIAL

## 2024-09-12 ENCOUNTER — LAB VISIT (OUTPATIENT)
Dept: LAB | Facility: HOSPITAL | Age: 49
End: 2024-09-12
Attending: FAMILY MEDICINE
Payer: COMMERCIAL

## 2024-09-12 DIAGNOSIS — E34.9 TESTOSTERONE DEFICIENCY: Primary | ICD-10-CM

## 2024-09-12 DIAGNOSIS — E11.9 TYPE 2 DIABETES MELLITUS WITHOUT COMPLICATION, WITHOUT LONG-TERM CURRENT USE OF INSULIN: ICD-10-CM

## 2024-09-12 LAB
ALBUMIN SERPL BCP-MCNC: 4.3 G/DL (ref 3.5–5.2)
ALP SERPL-CCNC: 55 U/L (ref 55–135)
ALT SERPL W/O P-5'-P-CCNC: 43 U/L (ref 10–44)
ANION GAP SERPL CALC-SCNC: 7 MMOL/L (ref 8–16)
AST SERPL-CCNC: 29 U/L (ref 10–40)
BILIRUB SERPL-MCNC: 0.4 MG/DL (ref 0.1–1)
BUN SERPL-MCNC: 12 MG/DL (ref 6–20)
CALCIUM SERPL-MCNC: 9.4 MG/DL (ref 8.7–10.5)
CHLORIDE SERPL-SCNC: 103 MMOL/L (ref 95–110)
CO2 SERPL-SCNC: 29 MMOL/L (ref 23–29)
CREAT SERPL-MCNC: 1 MG/DL (ref 0.5–1.4)
EST. GFR  (NO RACE VARIABLE): >60 ML/MIN/1.73 M^2
ESTIMATED AVG GLUCOSE: 134 MG/DL (ref 68–131)
GLUCOSE SERPL-MCNC: 125 MG/DL (ref 70–110)
HBA1C MFR BLD: 6.3 % (ref 4–5.6)
POTASSIUM SERPL-SCNC: 4.7 MMOL/L (ref 3.5–5.1)
PROT SERPL-MCNC: 6.8 G/DL (ref 6–8.4)
SODIUM SERPL-SCNC: 139 MMOL/L (ref 136–145)
TESTOST SERPL-MCNC: 273 NG/DL (ref 304–1227)

## 2024-09-12 PROCEDURE — 80053 COMPREHEN METABOLIC PANEL: CPT | Performed by: FAMILY MEDICINE

## 2024-09-12 PROCEDURE — 84403 ASSAY OF TOTAL TESTOSTERONE: CPT | Performed by: FAMILY MEDICINE

## 2024-09-12 PROCEDURE — 99999 PR PBB SHADOW E&M-EST. PATIENT-LVL II: CPT | Mod: PBBFAC,,,

## 2024-09-12 PROCEDURE — 83036 HEMOGLOBIN GLYCOSYLATED A1C: CPT | Performed by: FAMILY MEDICINE

## 2024-09-12 RX ADMIN — TESTOSTERONE CYPIONATE 400 MG: 200 INJECTION, SOLUTION INTRAMUSCULAR at 08:09

## 2024-09-12 NOTE — PROGRESS NOTES
Patient came into clinic today for DepoTestosterone 400mg as ordered by . Patient instructed to wait 15 minutes following injection for possible reaction. Pt verbalized understanding and tolerated well.     Lot # [id number]  Expires 2026/06

## 2024-09-24 ENCOUNTER — PATIENT MESSAGE (OUTPATIENT)
Dept: PAIN MEDICINE | Facility: CLINIC | Age: 49
End: 2024-09-24
Payer: COMMERCIAL

## 2024-09-26 ENCOUNTER — OFFICE VISIT (OUTPATIENT)
Dept: INTERNAL MEDICINE | Facility: CLINIC | Age: 49
End: 2024-09-26
Payer: COMMERCIAL

## 2024-09-26 ENCOUNTER — OFFICE VISIT (OUTPATIENT)
Dept: PAIN MEDICINE | Facility: CLINIC | Age: 49
End: 2024-09-26
Payer: COMMERCIAL

## 2024-09-26 VITALS
OXYGEN SATURATION: 98 % | DIASTOLIC BLOOD PRESSURE: 64 MMHG | WEIGHT: 246.94 LBS | TEMPERATURE: 98 F | HEIGHT: 71 IN | BODY MASS INDEX: 34.57 KG/M2 | HEART RATE: 86 BPM | SYSTOLIC BLOOD PRESSURE: 120 MMHG

## 2024-09-26 VITALS
HEART RATE: 88 BPM | DIASTOLIC BLOOD PRESSURE: 74 MMHG | WEIGHT: 249.81 LBS | HEIGHT: 71 IN | BODY MASS INDEX: 34.97 KG/M2 | SYSTOLIC BLOOD PRESSURE: 107 MMHG

## 2024-09-26 DIAGNOSIS — M51.36 DDD (DEGENERATIVE DISC DISEASE), LUMBAR: ICD-10-CM

## 2024-09-26 DIAGNOSIS — M54.16 LUMBAR RADICULOPATHY: ICD-10-CM

## 2024-09-26 DIAGNOSIS — M47.816 LUMBAR SPONDYLOSIS: ICD-10-CM

## 2024-09-26 DIAGNOSIS — E11.9 TYPE 2 DIABETES MELLITUS WITHOUT COMPLICATION, WITHOUT LONG-TERM CURRENT USE OF INSULIN: Primary | ICD-10-CM

## 2024-09-26 DIAGNOSIS — K76.0 HEPATIC STEATOSIS: ICD-10-CM

## 2024-09-26 DIAGNOSIS — M54.16 LUMBAR RADICULOPATHY: Primary | ICD-10-CM

## 2024-09-26 DIAGNOSIS — E78.5 HYPERLIPIDEMIA, UNSPECIFIED HYPERLIPIDEMIA TYPE: ICD-10-CM

## 2024-09-26 DIAGNOSIS — Z02.89 PAIN MANAGEMENT CONTRACT AGREEMENT: Primary | ICD-10-CM

## 2024-09-26 DIAGNOSIS — Z02.89 PAIN MANAGEMENT CONTRACT AGREEMENT: ICD-10-CM

## 2024-09-26 DIAGNOSIS — E34.9 TESTOSTERONE DEFICIENCY: ICD-10-CM

## 2024-09-26 PROCEDURE — 80307 DRUG TEST PRSMV CHEM ANLYZR: CPT | Performed by: PHYSICIAN ASSISTANT

## 2024-09-26 PROCEDURE — 99999 PR PBB SHADOW E&M-EST. PATIENT-LVL III: CPT | Mod: PBBFAC,,, | Performed by: FAMILY MEDICINE

## 2024-09-26 PROCEDURE — 80326 AMPHETAMINES 5 OR MORE: CPT | Performed by: PHYSICIAN ASSISTANT

## 2024-09-26 PROCEDURE — 99999 PR PBB SHADOW E&M-EST. PATIENT-LVL III: CPT | Mod: PBBFAC,,, | Performed by: PHYSICIAN ASSISTANT

## 2024-09-26 RX ORDER — HYDROCODONE BITARTRATE AND ACETAMINOPHEN 5; 325 MG/1; MG/1
1 TABLET ORAL EVERY 12 HOURS PRN
Qty: 60 TABLET | Refills: 0 | Status: SHIPPED | OUTPATIENT
Start: 2024-11-24

## 2024-09-26 RX ORDER — HYDROCODONE BITARTRATE AND ACETAMINOPHEN 5; 325 MG/1; MG/1
1 TABLET ORAL EVERY 12 HOURS PRN
Qty: 45 TABLET | Refills: 0 | Status: SHIPPED | OUTPATIENT
Start: 2024-10-25

## 2024-09-26 RX ORDER — HYDROCODONE BITARTRATE AND ACETAMINOPHEN 5; 325 MG/1; MG/1
1 TABLET ORAL EVERY 12 HOURS PRN
Qty: 45 TABLET | Refills: 0 | Status: SHIPPED | OUTPATIENT
Start: 2024-09-26

## 2024-09-26 RX ADMIN — TESTOSTERONE CYPIONATE 400 MG: 200 INJECTION, SOLUTION INTRAMUSCULAR at 10:09

## 2024-09-26 NOTE — PROGRESS NOTES
Patient came into clinic today for DepoTestosterone 400mg as ordered by . Pt instructed to wait 15 minutes following injection for possible reaction. pt verbalized understanding and tolerated well.     lot # [id number]  expires 02/2027

## 2024-09-26 NOTE — PROGRESS NOTES
Subjective:      Patient ID: Kendrick Nava is a 48 y.o. male.    Chief Complaint: Follow-up      History of Present Illness    CHIEF COMPLAINT:  Mr. Nava presents today for follow up.  A1c improved to 6.3.  LFTs back to normal range, other results at goal    DIABETES MANAGEMENT:  He reports reduced appetite while taking Ozempic 2 mg, which has helped lower his blood sugar. He describes feeling suddenly full during meals, leading to reduced food intake.    TESTOSTERONE THERAPY:  He receives 400mg testosterone injections every two weeks. He experiences fluctuating effects throughout the treatment cycle, feeling the medication's impact 2-3 days post-injection followed by a gradual decline. He reports inconsistent testosterone levels and no significant energy boost. He occasionally has difficulty recognizing when his next injection is due, sometimes only realizing when he begins to feel unwell.    PAIN MANAGEMENT:  He emphasizes the importance of gabapentin for his pain control, noticing a significant difference if he misses his morning dose. He uses additional pain medication when necessary, though not at doses that impair his ability to work. He requires a prescription renewal after missing a three-month follow-up visit.    BACK PAIN:  He experiences back pain after prolonged sitting, particularly during his 1 hour and 20 minute commute. He rates the pain as 5-6/10 when walking from his truck to his house after driving. His next treatment option may involve trimming around the nerve root if injections become ineffective. He expresses interest in seeking a second opinion.    FOOT SYMPTOMS:  He reports cold feet, particularly noticeable when removing shoes in the afternoon. He describes a tingling sensation in his feet, which is not complete numbness. He experiences discomfort with regular shoes, having switched to rubber shoes for work. He finds it difficult to wear formal footwear for special occasions. He  denies cold hands or fingers.    LEG CRAMPS:  He attributes leg cramps to work activities, particularly being on his knees.    SLEEP:  He reports improved sleep patterns due to earlier bedtimes, acknowledging the benefits for his overall well-being.    WORK-RELATED ISSUES:  He reports prolonged kneeling at work exacerbating his back issues. His long commute involves sitting for an hour and 20 minutes, causing significant discomfort. He mentions recent long workdays contributing to his discomfort.          Review of Systems   Constitutional:  Positive for weight loss. Negative for chills and fever.   Respiratory:  Negative for shortness of breath.    Cardiovascular:  Negative for chest pain, claudication and leg swelling.   Musculoskeletal:  Positive for back pain.   Skin:  Negative for rash.   Neurological:  Positive for tingling.       Past Medical History:   Diagnosis Date    Diabetes mellitus, type 2     Hyperlipidemia     Testosterone deficiency     LOW Testosterone          Past Surgical History:   Procedure Laterality Date    EPIDURAL STEROID INJECTION N/A 2/2/2023    Procedure: Lumbar L5/S1 IL JONATAN with right paramedian approach RN IV Sedation;  Surgeon: Nico Angeles MD;  Location: [facility];  Service: Pain Management;  Laterality: N/A;    SELECTIVE INJECTION OF ANESTHETIC AGENT AROUND LUMBAR SPINAL NERVE ROOT BY TRANSFORAMINAL APPROACH Bilateral 4/4/2023    Procedure: Bilateral L5/S1 TF JONATAN;  Surgeon: Nico Angeles MD;  Location: [facility];  Service: Pain Management;  Laterality: Bilateral;    SELECTIVE INJECTION OF ANESTHETIC AGENT AROUND LUMBAR SPINAL NERVE ROOT BY TRANSFORAMINAL APPROACH Bilateral 12/7/2023    Procedure: Bilateral L5/S1 TF JONATAN;  Surgeon: Nioc Angeles MD;  Location: [facility];  Service: Pain Management;  Laterality: Bilateral;    SELECTIVE INJECTION OF ANESTHETIC AGENT AROUND LUMBAR SPINAL NERVE ROOT BY TRANSFORAMINAL APPROACH Bilateral 3/21/2024    Procedure:  "Bilateral L5/S1 TF JONATAN;  Surgeon: Nico Angeles MD;  Location: [facility];  Service: Pain Management;  Laterality: Bilateral;     Family History   Problem Relation Name Age of Onset    Diabetes type II Mother      Diabetes type II Father      Hypertension Father      Diabetes type II Brother      Cancer Maternal Grandmother      Diabetes Maternal Grandfather      No Known Problems Paternal Grandmother      Heart attack Paternal Grandfather       Social History     Socioeconomic History    Marital status:    Tobacco Use    Smoking status: Some Days     Types: Vaping with nicotine    Smokeless tobacco: Never   Substance and Sexual Activity    Alcohol use: Yes     Alcohol/week: 1.0 standard drink of alcohol     Types: 1 Shots of liquor per week     Comment: once a month    Drug use: Never    Sexual activity: Yes     Partners: Male     Review of patient's allergies indicates:  No Known Allergies    Objective:       /64 (BP Location: Left arm, Patient Position: Sitting, BP Method: Large (Automatic))   Pulse 86   Temp 97.6 °F (36.4 °C) (Tympanic)   Ht 5' 11" (1.803 m)   Wt 112 kg (246 lb 14.6 oz)   SpO2 98%   BMI 34.44 kg/m²   Physical Exam    Lungs: Normal breath sounds.        Physical Exam  Constitutional:       General: He is not in acute distress.     Appearance: Normal appearance. He is well-developed. He is not ill-appearing or diaphoretic.   Cardiovascular:      Rate and Rhythm: Normal rate and regular rhythm.      Heart sounds: Normal heart sounds.   Pulmonary:      Effort: Pulmonary effort is normal.      Breath sounds: Normal breath sounds.   Neurological:      General: No focal deficit present.      Mental Status: He is alert and oriented to person, place, and time.   Psychiatric:         Mood and Affect: Mood normal.         Behavior: Behavior normal.         Thought Content: Thought content normal.         Judgment: Judgment normal.         Assessment:     1. Type 2 diabetes " mellitus without complication, without long-term current use of insulin    2. Hyperlipidemia, unspecified hyperlipidemia type    3. Testosterone deficiency    4. Hepatic steatosis      Plan:   Assessment & Plan    DIABETES:  - Reviewed patient's A1C, noting significant improvement and control with Ozempic 2mg.  - Assessed liver function, which has returned to normal range, likely due to improved glycemic control and dietary changes from Ozempic.  - Explained the mechanism of Ozempic and its potential for continued improvement in glycemic control over time.  - Discussed the relationship between diabetes, fatty liver, and the importance of maintaining controlled blood sugar to prevent liver complications.  - Continued Ozempic 2mg for diabetes management.    HYPOGONADISM:  - Evaluated testosterone therapy, currently at maximum dose of 400mg every 2 weeks.  - Considered changing testosterone regimen to 200mg weekly for more stable levels, pending patient's wife's ability to administer injections at home.  - Educated on the nature of injectable testosterone as a depot formulation and its inherent fluctuations in blood levels.  - Provided information on various testosterone administration methods (injections, topical, pellets) and their pros and cons.  - Mr. Orozcojeremiah to discuss with wife about learning to administer testosterone injections at home.  - Continued testosterone 400mg every 2 weeks, with potential plan to change to 200mg weekly if wife agrees to home administration.  - Follow up for next testosterone injection with wife present for training on home administration, if agreed upon.    NEUROPATHY:  - Assessed patient's reports of cold feet and tingling, potentially related to diabetes or back issues.  - Continued gabapentin for nerve pain management.    BACK PAIN:  - Considered patient's back pain management, noting current use of gabapentin and pain medication.  - Continued current pain medication.  - Follow up  with Dr. Angeles regarding back pain management and potential nerve root trimming procedure.    SLEEP DISORDER:  - Mr. Nava to continue current sleep improvements.    LABS:  - Blood work ordered in 6 months.    Portions of this note were generated by [employer].        Type 2 diabetes mellitus without complication, without long-term current use of insulin  -     Hemoglobin A1C; Future; Expected date: 03/25/2025  -     Comprehensive Metabolic Panel; Future; Expected date: 03/25/2025  -     Lipid Panel; Future; Expected date: 03/25/2025  -     CBC Auto Differential; Future; Expected date: 03/25/2025    Hyperlipidemia, unspecified hyperlipidemia type    Testosterone deficiency  -     Testosterone; Future; Expected date: 09/26/2024    Hepatic steatosis    Continue all current medications  Above labs in 6 months prior to visit with me.    Medication List with Changes/Refills   Current Medications    CELECOXIB (CELEBREX) 200 MG CAPSULE    Take 1 capsule (200 mg total) by mouth 2 (two) times daily.    CYCLOBENZAPRINE (FLEXERIL) 10 MG TABLET    Take 1 tablet (10 mg total) by mouth every evening.    GABAPENTIN (NEURONTIN) 600 MG TABLET    Take 2 tablets (1,200 mg total) by mouth 2 (two) times daily.    METFORMIN (GLUCOPHAGE-XR) 750 MG ER 24HR TABLET    Take two tablets with breakfast and one in the evenings.    ROSUVASTATIN (CRESTOR) 20 MG TABLET    TAKE 1 TABLET(20 MG) BY MOUTH EVERY DAY    SEMAGLUTIDE (OZEMPIC) 1 MG/DOSE (4 MG/3 ML)    Inject 1 mg into the skin every 7 days.    SEMAGLUTIDE (OZEMPIC) 2 MG/DOSE (8 MG/3 ML) PNIJ    Inject 2 mg into the skin every 7 days.       This note was generated with the assistance of ambient listening technology. Verbal consent was obtained by the patient and accompanying visitor(s) for the recording of patient appointment to facilitate this note. I attest to having reviewed and edited the generated note for accuracy, though some syntax or spelling errors may persist. Please contact  the author of this note for any clarification.

## 2024-09-26 NOTE — PROGRESS NOTES
Est Patient Chronic Pain Note (Follow up Visit)      Chief Complaint:   Chief Complaint   Patient presents with    Low-back Pain     Radiating down the leg left side        SUBJECTIVE:  Interval History (2024):   Kendrick Nava presents today for follow-up visit.  Patient was last seen on 2024.  Patient was suppose to follow up sooner but had to reschedule after attending a  and being exposed to COVID.  Patient reports pain as 6/10 today. He is currently taking Gabapentin, Celebrex, Flexeril, and   Norco. Patient is tolerating medications well without any unwanted side effects. He does request refills for Norco only.  No new issues or concerns today.     Initial HPI (2024):  Kendrick Nava is a 48 y.o. male presents today for persistent lower back and leg pain.  He continues to have numbness and tingling in his left lower extremity with weakness with foot dorsiflexion..  He is s/p repeat bilateral L5-S1 TFESI that occurred on 2024.  He reports that this resulted in 75% relief relief that is waning in relief but still managing it well with current medication regimen of gabapentin, Celebrex, Flexeril, and  occasional use of Norco.  Current pain intensity is 4/10.  He is seeing outside neurosurgeon who discussed potential lumbar spine surgery.       Patient denies night fever/night sweats, urinary incontinence, bowel incontinence, significant weight loss, significant motor weakness, and loss of sensations.    Pain Disability Index Review:      2024    12:50 PM 2024     8:51 AM 2024    11:42 AM   Last 3 PDI Scores   Pain Disability Index (PDI) 42 35 38       Interval HPI 2024:  Kendrick Nava is a 48 y.o. male presents today for persistent lower back and leg pain.  He continues to have numbness and tingling in his left lower extremity with weakness with foot dorsiflexion..  He is s/p bilateral L5-S1 TFESI that occurred on 2023.  He reports that this resulted  in 70% relief that is waning in relief.  Current pain intensity is 4/10.  He is seeing outside neurosurgeon who discussed potential lumbar spine surgery, but ultimately left it up to the patient for which the patient was unsure of what he would like to do at this time.    Interval HPI 11/13/2023:  Kendrick Nava is a 48 y.o. male presents today for follow-up chronic lower back pain.   He reports that he has had severe worsening lower back pain and left lower extremity pain with weakness.  He reports that he had a fall off of a ladder due to this weakness, but denies any injuries prior to the initiation of this pain flare.  Current pain intensity is 6/10, but states it can increase to 10/10 at its worst.     Interval HPI 10/18/2023:  Kendrick Nava is a 47 y.o. male presents today for follow-up chronic lower back pain.  Current pain intensity is 4/10.  Reports that his pain is stable with use of gabapentin, Celebrex, Flexeril and Norco p.r.n..  He denies any adverse reactions to these medications he reports his pain is well managed with these medications.  He is able to complete his daily task and labor intensive job without any significant issues.    Interval History (7/18/2023):  Kendrick Nava presents today for follow-up visit.  Patient was last seen on 5/2/2023. He reports his leg pain is well-controlled with Gabapentin, he takes 900 mg in the morning and 900 mg at night. Continues to take Celebrex daily and Flexeril nightly with good relief, Norco sparingly for severe. Pain. Reports right sided pain in his lower back that wraps around into his stomach x 3 weeks. Patient reports pain as 4/10 today.    Interval History (5/1/2023): Kendrick Nava presents today for follow-up visit.  he underwent bilateral L5/S1 TF JONATAN on 4/4/23.  The patient reports that he is/was better following the procedure.  he reports initially experienced worsening pain for about a week after the procedure, followed by  95% pain  relief x 1 week before relief dwindled to 20%. Reports continued pressure in his lower back and pain across the lumbosacral region in a band like distribution with radiation into his right lower extremity along L5/S1 distribution. Takes Gabapentin and Flexeril nightly with good relief, no side effects. Norco for severe pain.  Patient reports pain as 6/10 today.    Interval HPI 03/13/2023  Kendrick Nava is a 47 y.o. male presents today for injection follow-up.  He was last seen for L5-S1 IL JONATAN on 02/02/2023.  He reports that this resulted in 80+ % relief for the initial 2 weeks, but now has decreased to about 50-60% relief.  He feels that the pain is of the same type and quality and is in the same location..    Interval History (1/13/2023):  Kendrick Nava presents today for follow-up visit.  Patient was last seen on Visit date not found. Patient reports pain as 5/10 today.  He has been performing physician directed exercises targeting sciatic nerve pain for several weeks without improvement. He reports continued pain in his thoracolumbar region but his worst pain is persistently along his lumbosacral region in a band-like distribution with radiation into his right lower extremity along L5/S1 distribution. Reports occasional radiation into his left leg. He installs italo for a living and reports prolonged bending and stooping worsens his symptoms. He reports by the end of the day he walks with a limp.      Initial HPI 08/29/2022  Kendrick Nava is a 47 y.o. male who presents to the clinic for the evaluation of back pain.  He was referred by his primary care provider for further evaluation and management of this pain.  The pain started a few years ago following a fall from a ladder in 2018 and symptoms have been unchanged.The pain is located in the right thoracolumbar area and radiates to the lumbosacral region with occasional radiation into the left lower extremity extending to the foot and ankle.  The  pain is described as aching, numbness, tingling burning and is rated as 6/10. The pain is rated with a score of  4/10 on the BEST day and a score of 8/10 on the WORST day.  Symptoms interfere with daily activity. The pain is exacerbated by work activities, prolonged standing or sitting.  The pain is mitigated by activity modification.       Non-Pharmacologic Treatments:  Physical Therapy/Home Exercise: yes  Ice/Heat:yes  TENS: no  Acupuncture: no  Massage: yes  Chiropractic: yes    Other: no      Pain Medications:  - Opioids:  Tramadol  - Adjuvant Medications:  Mobic  - Anti-Coagulants:  None     report:  Reviewed and consistent with medication use as prescribed.      Pain Procedures:   -12/07/2023:  Bilateral L5-S1 TFESI, 70% relief overall  -02/02/2023:  L5-S1 IL JONATAN, 80% relief for the initial 2 weeks with continued 50-60% relief  - bilateral L5/S1 TF JONATAN on 4/4/23 with 95% relief x 1 week, then 20% relief  -bilateral L5/S1 TFESI on 12/07/2023, 70% relief  - bilateral L5-S1 TFESI on 03/21/2024, 75% relief    Imaging:   Outside CT and MRI of the thoracic lumbar spine reviewed 2018    MRI lumbar spine 11/17/2023:  There are 5 non-rib-bearing lumbar vertebrae.  Alignment is unremarkable with no significant listhesis.  No acute fracture or compression deformity.  No aggressive focal signal abnormality.     Conus medullaris terminates at the L1 level.  Conus medullaris is normal in size and signal.     T12-L1: Mild disc desiccation and trace bulge.  No significant spinal canal or neural foraminal stenosis.     L1-L2: Mild disc desiccation.  No significant spinal canal or neural foraminal stenosis.     L2-L3: Mild disc desiccation and trace bulge.  No significant spinal canal or neural foraminal stenosis.     L3-L4: Disc desiccation and small bulge.  Mild bilateral facet arthropathy.  No significant spinal canal stenosis.  Mild bilateral neural foraminal stenosis.     L4-L5: Disc desiccation and asymmetric to the  left disc bulge.  Mild bilateral facet arthropathy.  No significant spinal canal stenosis.  Moderate left and mild right neural foraminal stenosis.     L5-S1: Disc desiccation and asymmetric to the right disc bulge.  Mild bilateral facet arthropathy.  No significant spinal canal stenosis although there is encroachment on the right lateral recess and descending right S1 nerve root.  No significant neural foraminal stenosis.     Paraspinal soft tissues are unremarkable.  Visualized intra-abdominal and pelvic contents are also unremarkable.    CT lumbar spine 12/02/2022     FINDINGS:  No acute fracture or dislocation.  Moderate disc height loss T11-12 and T12-L1.  Mild disc height loss at L1-2 through L4-5.  Mild facet arthropathy.  No significant endplate sclerosis.  Mild Schmorl's node formation T12-L1 and L1-2.     SI joints unremarkable.  Mild aortoiliac atherosclerotic calcification.     Impression:     No acute abnormality identified.  Chronic mild discogenic degenerative change as described above    CT lumbar spine 07/22/2018  1.  Acute, traumatic fractures involving the left L2, left L3, and left L4 transverse processes.     2.  Multilevel lumbar degenerative disc disease, spondylosis, and stenoses, as discussed above. This is probably most prominent at L5-S1 on the right, with there is mass effect on and posterior displacement of the right S1 nerve root within the lateral recess, with right lateral recess stenosis.    X-ray lumbar spine 12/02/2021:  The vertebral bodies of the lumbar spine demonstrate a normal height and alignment.  There appears to be mild chronic vertebral body height loss at the T11 and T12 levels with some spondylosis noted at these levels.  The disc space heights appear to be relatively well maintained.  Mild spondylosis noted anteriorly at the L3-4 and to a lesser degree the L4-5 levels.  No pars defects are noted.       Past Medical History:   Diagnosis Date    Diabetes mellitus, type 2      Hyperlipidemia     Testosterone deficiency     LOW Testosterone     Past Surgical History:   Procedure Laterality Date    EPIDURAL STEROID INJECTION N/A 2/2/2023    Procedure: Lumbar L5/S1 IL JONATAN with right paramedian approach RN IV Sedation;  Surgeon: Nico Angeles MD;  Location: [facility];  Service: Pain Management;  Laterality: N/A;    SELECTIVE INJECTION OF ANESTHETIC AGENT AROUND LUMBAR SPINAL NERVE ROOT BY TRANSFORAMINAL APPROACH Bilateral 4/4/2023    Procedure: Bilateral L5/S1 TF JONATAN;  Surgeon: Nico Angeles MD;  Location: [facility];  Service: Pain Management;  Laterality: Bilateral;    SELECTIVE INJECTION OF ANESTHETIC AGENT AROUND LUMBAR SPINAL NERVE ROOT BY TRANSFORAMINAL APPROACH Bilateral 12/7/2023    Procedure: Bilateral L5/S1 TF JONATAN;  Surgeon: Nico Angeles MD;  Location: [facility];  Service: Pain Management;  Laterality: Bilateral;    SELECTIVE INJECTION OF ANESTHETIC AGENT AROUND LUMBAR SPINAL NERVE ROOT BY TRANSFORAMINAL APPROACH Bilateral 3/21/2024    Procedure: Bilateral L5/S1 TF JONATAN;  Surgeon: Nico Angeles MD;  Location: [facility];  Service: Pain Management;  Laterality: Bilateral;     Social History     Socioeconomic History    Marital status:    Tobacco Use    Smoking status: Some Days     Types: Vaping with nicotine    Smokeless tobacco: Never   Substance and Sexual Activity    Alcohol use: Yes     Alcohol/week: 1.0 standard drink of alcohol     Types: 1 Shots of liquor per week     Comment: once a month    Drug use: Never    Sexual activity: Yes     Partners: Male     Family History   Problem Relation Name Age of Onset    Diabetes type II Mother      Diabetes type II Father      Hypertension Father      Diabetes type II Brother      Cancer Maternal Grandmother      Diabetes Maternal Grandfather      No Known Problems Paternal Grandmother      Heart attack Paternal Grandfather         Review of patient's allergies indicates:  No Known  "Allergies      Current Outpatient Medications   Medication Sig    celecoxib (CELEBREX) 200 MG capsule Take 1 capsule (200 mg total) by mouth 2 (two) times daily.    cyclobenzaprine (FLEXERIL) 10 MG tablet Take 1 tablet (10 mg total) by mouth every evening.    gabapentin (NEURONTIN) 600 MG tablet Take 2 tablets (1,200 mg total) by mouth 2 (two) times daily.    metFORMIN (GLUCOPHAGE-XR) 750 MG ER 24hr tablet Take two tablets with breakfast and one in the evenings.    rosuvastatin (CRESTOR) 20 MG tablet TAKE 1 TABLET(20 MG) BY MOUTH EVERY DAY    semaglutide (OZEMPIC) 2 mg/dose (8 mg/3 mL) PnIj Inject 2 mg into the skin every 7 days.     Current Facility-Administered Medications   Medication    testosterone cypionate injection 400 mg       Review of Systems     GENERAL:  No weight loss, malaise or fevers.  HEENT:   No recent changes in vision or hearing  NECK:  Negative for lumps, no difficulty with swallowing.  RESPIRATORY:  Negative for cough, wheezing or shortness of breath, patient denies any recent URI.  CARDIOVASCULAR:  Negative for chest pain, leg swelling or palpitations.  GI:  Negative for abdominal discomfort, blood in stools or black stools or change in bowel habits.  MUSCULOSKELETAL:  See HPI.  SKIN:  Negative for lesions, rash, and itching.  PSYCH:  No mood disorder or recent psychosocial stressors.  Patients sleep is not disturbed secondary to pain.  HEMATOLOGY/LYMPHOLOGY:  Negative for prolonged bleeding, bruising easily or swollen nodes.  Patient is not currently taking any anti-coagulants  NEURO:   No history of headaches, syncope, paralysis, seizures or tremors.  All other reviewed and negative other than HPI.    OBJECTIVE:    /74   Pulse 88   Ht 5' 11" (1.803 m)   Wt 113.3 kg (249 lb 12.5 oz)   BMI 34.84 kg/m²   Physical Exam    GENERAL: Well appearing, in no acute distress, alert and oriented x3.  PSYCH:  Mood and affect appropriate.  SKIN: Skin color, texture, turgor normal, no rashes or " lesions.  HEAD/FACE:  Normocephalic, atraumatic. Cranial nerves grossly intact.  PULM: No evidence of respiratory difficulty, symmetric chest rise.  GI:  Soft and non-tender, negative heredia's sign, negative Mcburney's sign, negative CVA tenderness  BACK: Straight leg raising in the sitting and supine positions is positive to radicular pain on the left.   Moderate pain to palpation over the facet joints of the lumbar spine and lumbar paraspinals, mostly on the left.   Limited range of motion secondary to pain reproduction.  EXTREMITIES: No deformities, edema, or skin discoloration. Good capillary refill.  MUSCULOSKELETAL:  Hip and knee provocative maneuvers are negative.  There is no pain with palpation over the sacroiliac joints bilaterally. Moderated tenderness along right quadratus lumborum. FABERs test is negative.  FADIRs test is negative.   Bilateral upper and lower extremity strength is normal and symmetric with the exception of left-sided footdrop at 3/5.  No atrophy or tone abnormalities are noted.  NEURO: Bilateral upper and lower extremity coordination and muscle stretch reflexes are physiologic and symmetric.  Plantar response are downgoing. No clonus.  No loss of sensation is noted.  GAIT:  slow, antalgic, steppage gait.      LABS:  Lab Results   Component Value Date    WBC 9.05 05/30/2024    HGB 15.0 05/30/2024    HCT 45.3 05/30/2024    MCV 86 05/30/2024     05/30/2024       CMP  Sodium   Date Value Ref Range Status   09/12/2024 139 136 - 145 mmol/L Final     Potassium   Date Value Ref Range Status   09/12/2024 4.7 3.5 - 5.1 mmol/L Final     Chloride   Date Value Ref Range Status   09/12/2024 103 95 - 110 mmol/L Final     CO2   Date Value Ref Range Status   09/12/2024 29 23 - 29 mmol/L Final     Glucose   Date Value Ref Range Status   09/12/2024 125 (H) 70 - 110 mg/dL Final     BUN   Date Value Ref Range Status   09/12/2024 12 6 - 20 mg/dL Final     Creatinine   Date Value Ref Range Status    09/12/2024 1.0 0.5 - 1.4 mg/dL Final     Calcium   Date Value Ref Range Status   09/12/2024 9.4 8.7 - 10.5 mg/dL Final     Total Protein   Date Value Ref Range Status   09/12/2024 6.8 6.0 - 8.4 g/dL Final     Albumin   Date Value Ref Range Status   09/12/2024 4.3 3.5 - 5.2 g/dL Final   11/04/2022 4.8 3.6 - 5.1 g/dL Final     Total Bilirubin   Date Value Ref Range Status   09/12/2024 0.4 0.1 - 1.0 mg/dL Final     Comment:     For infants and newborns, interpretation of results should be based  on gestational age, weight and in agreement with clinical  observations.    Premature Infant recommended reference ranges:  Up to 24 hours.............<8.0 mg/dL  Up to 48 hours............<12.0 mg/dL  3-5 days..................<15.0 mg/dL  6-29 days.................<15.0 mg/dL       Alkaline Phosphatase   Date Value Ref Range Status   09/12/2024 55 55 - 135 U/L Final     AST   Date Value Ref Range Status   09/12/2024 29 10 - 40 U/L Final     ALT   Date Value Ref Range Status   09/12/2024 43 10 - 44 U/L Final     Anion Gap   Date Value Ref Range Status   09/12/2024 7 (L) 8 - 16 mmol/L Final     eGFR if    Date Value Ref Range Status   06/06/2022 >60.0 >60 mL/min/1.73 m^2 Final     eGFR if non    Date Value Ref Range Status   06/06/2022 >60.0 >60 mL/min/1.73 m^2 Final     Comment:     Calculation used to obtain the estimated glomerular filtration  rate (eGFR) is the CKD-EPI equation.          Lab Results   Component Value Date    HGBA1C 6.3 (H) 09/12/2024     ASSESSMENT: 48 y.o. year old male with chronic lower back pain, consistent with     1. Lumbar radiculopathy  Pain Clinic Drug Screen      2. Lumbar spondylosis  Pain Clinic Drug Screen      3. DDD (degenerative disc disease), lumbar  Pain Clinic Drug Screen      4. Pain management contract agreement  Pain Clinic Drug Screen        PLAN:   - Interventions:  None at this time.     Advised patient he can message the clinic whenever he needs to  schedule another JONATAN.    S/p repeat bilateral L5-S1 TFESI on 03/21/2024, 75% relief  s/p bilateral L5/S1 TF JONATAN on 12/07/2023 with 70% relief overall  -s/p bilateral L5/S1 TF JONATAN on 4/4/23 with 95% relief x 1 week, then 20% relief  s/p L5/S1 IL JONATAN with 80% relief with the initial 2 weeks.      - Anticoagulation use: no       - Medications: I have stressed the importance of physical activity and a home exercise plan to help with pain and improve health. and Patient can continue with medications for now since they are providing benefits, using them appropriately, and without side effects.     An opioid pain contract was completed on 06/12/2024 after having an extensive conversation about chronic opioid use for pain management. We discussed the risks and benefits of opioids.  I discouraged the patient from escalation of opioid use and try to minimize its use to decrease chance of dependence, tolerance, and opioid-based hyperalgesia.  I reviewed the  in great detail and there are no inconsistencies and it is appropriate with patient's history.  There are no signs of aberrant drug behavior.  The opioid risk tool was also completed.  Pt counseled about the side effects of long term use of opioids including dependence, tolerance, addiction, respiratory depression, somnelence , immune and endocrine dysfunction.  The patient expressed understanding.         Will provide Norco 5/325 mg Q 12 p.r.n., 45 tablets.  Fill dates for 09/26/2024, 10/25/2024, and 11/24/2024  -Continue Gabapentin 1200 mg BID  -Continue Celebrex 100 mg BID for day time pain/inflammation  -Continue Flexeril 10 mg nightly     report:  Reviewed and consistent with medication use as prescribed.      - Therapy:  Advised patient continue with activities as tolerated    - Imaging: Reviewed available imaging with patient and answered any questions they had regarding study.      - Consults/Referrals:  None at this time    - Records:  Reviewed/Obtain old  records from outside physicians and imaging    - Follow up visit:  3 months or as needed (virtual visit is appropriate).    - Other: - Will order UDS today to ensure medication compliance. Patient admits he has been out of pain medication for 2-3 days.       - Counseled patient regarding the importance of activity modification and physical therapy    - This condition does not require this patient to take time off of work, and the primary goal of our Pain Management services is to improve the patient's functional capacity.    - Patient Questions: Answered all of the patient's questions regarding diagnosis, therapy, and treatment        The above plan and management options were discussed at length with patient. Patient is in agreement with the above and verbalized understanding.    Visit today included increased complexity associated with the care of the episodic problem of chronic pain which was addressed and continue to manage the longitudinal care of the patient due to the serious and/or complex managed problem(s) listed above.        Basil Carpio PA-C  Interventional Pain Management  [facility]

## 2024-10-14 ENCOUNTER — CLINICAL SUPPORT (OUTPATIENT)
Dept: INTERNAL MEDICINE | Facility: CLINIC | Age: 49
End: 2024-10-14
Payer: COMMERCIAL

## 2024-10-14 ENCOUNTER — TELEPHONE (OUTPATIENT)
Dept: INTERNAL MEDICINE | Facility: CLINIC | Age: 49
End: 2024-10-14

## 2024-10-14 DIAGNOSIS — E34.9 TESTOSTERONE DEFICIENCY: Primary | ICD-10-CM

## 2024-10-14 PROCEDURE — 99999 PR PBB SHADOW E&M-EST. PATIENT-LVL II: CPT | Mod: PBBFAC,,,

## 2024-10-14 RX ORDER — CELECOXIB 200 MG/1
200 CAPSULE ORAL 2 TIMES DAILY
Qty: 60 CAPSULE | Refills: 1 | Status: SHIPPED | OUTPATIENT
Start: 2024-10-14

## 2024-10-14 RX ORDER — TESTOSTERONE CYPIONATE 200 MG/ML
200 INJECTION, SOLUTION INTRAMUSCULAR
Qty: 5 ML | Refills: 2 | Status: SHIPPED | OUTPATIENT
Start: 2024-10-14 | End: 2025-04-14

## 2024-10-14 RX ADMIN — TESTOSTERONE CYPIONATE 400 MG: 200 INJECTION, SOLUTION INTRAMUSCULAR at 03:10

## 2024-10-14 NOTE — TELEPHONE ENCOUNTER
No care due was identified.  [facility] Embedded Care Due Messages. Reference number: [id number].   10/13/2024 7:30:07 PM CDT

## 2024-10-14 NOTE — TELEPHONE ENCOUNTER
Patient came into clinic today for DepoTestosterone injection administration with education to wife for at home administering. Patient instructed to wait 15 minutes following injection for possible reaction. Pt verbalized understanding and tolerated injection well.     Wife instructed/educated on IM medication administration in ventrogluteal. Wife demonstrated drawing up medication with aseptic technique. Wife verbalized understanding of all steps for administration.     Pt needs rx sent in for new weekly dose at home.

## 2024-10-14 NOTE — PROGRESS NOTES
Patient came into clinic today for DepoTestosterone injection administration with education to wife for at home administering. Patient instructed to wait 15 minutes following injection for possible reaction. Pt verbalized understanding and tolerated injection well.     Wife instructed/educated on IM medication administration in ventrogluteal. Wife demonstrated drawing up medication with aseptic technique. Wife verbalized understanding of all steps for administration.

## 2024-10-14 NOTE — TELEPHONE ENCOUNTER
Requested Prescriptions     Pending Prescriptions Disp Refills    celecoxib (CELEBREX) 200 MG capsule [Pharmacy Med Name: CELECOXIB 200MG CAPSULES] 60 capsule 1     Sig: TAKE 1 CAPSULE(200 MG) BY MOUTH TWICE DAILY     LV 09/26/2024  LF 07/22/2024

## 2024-10-31 ENCOUNTER — PATIENT MESSAGE (OUTPATIENT)
Dept: PAIN MEDICINE | Facility: CLINIC | Age: 49
End: 2024-10-31
Payer: COMMERCIAL

## 2024-10-31 ENCOUNTER — PATIENT MESSAGE (OUTPATIENT)
Dept: INTERNAL MEDICINE | Facility: CLINIC | Age: 49
End: 2024-10-31
Payer: COMMERCIAL

## 2024-11-08 ENCOUNTER — PATIENT OUTREACH (OUTPATIENT)
Dept: ADMINISTRATIVE | Facility: HOSPITAL | Age: 49
End: 2024-11-08
Payer: COMMERCIAL

## 2024-12-07 NOTE — TELEPHONE ENCOUNTER
No care due was identified.  [facility] Embedded Care Due Messages. Reference number: [id number].   12/07/2024 3:47:04 AM CST

## 2024-12-09 RX ORDER — CELECOXIB 200 MG/1
200 CAPSULE ORAL 2 TIMES DAILY
Qty: 60 CAPSULE | Refills: 1 | Status: SHIPPED | OUTPATIENT
Start: 2024-12-09

## 2024-12-31 ENCOUNTER — OFFICE VISIT (OUTPATIENT)
Dept: PAIN MEDICINE | Facility: CLINIC | Age: 49
End: 2024-12-31
Payer: COMMERCIAL

## 2024-12-31 VITALS
SYSTOLIC BLOOD PRESSURE: 115 MMHG | WEIGHT: 251.31 LBS | BODY MASS INDEX: 35.18 KG/M2 | HEIGHT: 71 IN | HEART RATE: 91 BPM | RESPIRATION RATE: 17 BRPM | DIASTOLIC BLOOD PRESSURE: 75 MMHG

## 2024-12-31 DIAGNOSIS — M54.16 LUMBAR RADICULOPATHY: ICD-10-CM

## 2024-12-31 DIAGNOSIS — R20.0 NUMBNESS AND TINGLING OF BOTH UPPER EXTREMITIES: Primary | ICD-10-CM

## 2024-12-31 DIAGNOSIS — R20.2 NUMBNESS AND TINGLING OF BOTH UPPER EXTREMITIES: Primary | ICD-10-CM

## 2024-12-31 PROCEDURE — 3008F BODY MASS INDEX DOCD: CPT | Mod: CPTII,S$GLB,, | Performed by: PHYSICIAN ASSISTANT

## 2024-12-31 PROCEDURE — 3078F DIAST BP <80 MM HG: CPT | Mod: CPTII,S$GLB,, | Performed by: PHYSICIAN ASSISTANT

## 2024-12-31 PROCEDURE — 99214 OFFICE O/P EST MOD 30 MIN: CPT | Mod: S$GLB,,, | Performed by: PHYSICIAN ASSISTANT

## 2024-12-31 PROCEDURE — 3074F SYST BP LT 130 MM HG: CPT | Mod: CPTII,S$GLB,, | Performed by: PHYSICIAN ASSISTANT

## 2024-12-31 PROCEDURE — 99999 PR PBB SHADOW E&M-EST. PATIENT-LVL III: CPT | Mod: PBBFAC,,, | Performed by: PHYSICIAN ASSISTANT

## 2024-12-31 RX ORDER — GABAPENTIN 600 MG/1
1200 TABLET ORAL 2 TIMES DAILY
Qty: 120 TABLET | Refills: 11 | Status: SHIPPED | OUTPATIENT
Start: 2024-12-31 | End: 2025-12-31

## 2024-12-31 NOTE — PROGRESS NOTES
"Est Patient Chronic Pain Note (Follow up Visit)      Chief Complaint:   Chief Complaint   Patient presents with    Leg Pain    Hip Pain        SUBJECTIVE:  Interval History (2024):   Kendrick Nava presents today for follow-up visit. He is here today for a 3 month follow up. Patient reports arms/ legs are "falling asleep." He has numbness/tingling from the proximal calf to the foot. Both hands are numb, however it is more pronounced with the R hand.    Patient continues to utilize Norco 5/325, Gabapentin, Celebrex and Flexeril. He requests refills on Norco and Gabapentin today.       Interval History (24) Kendrick Nava presents today for follow-up visit.  Patient was last seen on 2024.  Patient was suppose to follow up sooner but had to reschedule after attending a  and being exposed to COVID.  Patient reports pain as 6/10 today. He is currently taking Gabapentin, Celebrex, Flexeril, and   Norco. Patient is tolerating medications well without any unwanted side effects. He does request refills for Norco only.  No new issues or concerns today.     Initial HPI (2024):  Kendrick Nava is a 49 y.o. male presents today for persistent lower back and leg pain.  He continues to have numbness and tingling in his left lower extremity with weakness with foot dorsiflexion..  He is s/p repeat bilateral L5-S1 TFESI that occurred on 2024.  He reports that this resulted in 75% relief relief that is waning in relief but still managing it well with current medication regimen of gabapentin, Celebrex, Flexeril, and  occasional use of Norco.  Current pain intensity is 4/10.  He is seeing outside neurosurgeon who discussed potential lumbar spine surgery.       Patient denies night fever/night sweats, urinary incontinence, bowel incontinence, significant weight loss, significant motor weakness, and loss of sensations.    Pain Disability Index Review:      2024    10:31 AM 2024    12:50 PM " 6/12/2024     8:51 AM   Last 3 PDI Scores   Pain Disability Index (PDI) 42 42 35       Interval HPI 01/24/2024:  Kendrick Nava is a 48 y.o. male presents today for persistent lower back and leg pain.  He continues to have numbness and tingling in his left lower extremity with weakness with foot dorsiflexion..  He is s/p bilateral L5-S1 TFESI that occurred on 12/07/2023.  He reports that this resulted in 70% relief that is waning in relief.  Current pain intensity is 4/10.  He is seeing outside neurosurgeon who discussed potential lumbar spine surgery, but ultimately left it up to the patient for which the patient was unsure of what he would like to do at this time.    Interval HPI 11/13/2023:  Kendrick Nava is a 48 y.o. male presents today for follow-up chronic lower back pain.   He reports that he has had severe worsening lower back pain and left lower extremity pain with weakness.  He reports that he had a fall off of a ladder due to this weakness, but denies any injuries prior to the initiation of this pain flare.  Current pain intensity is 6/10, but states it can increase to 10/10 at its worst.     Interval HPI 10/18/2023:  Kendrick Nava is a 47 y.o. male presents today for follow-up chronic lower back pain.  Current pain intensity is 4/10.  Reports that his pain is stable with use of gabapentin, Celebrex, Flexeril and Norco p.r.n..  He denies any adverse reactions to these medications he reports his pain is well managed with these medications.  He is able to complete his daily task and labor intensive job without any significant issues.    Interval History (7/18/2023):  Kendrick Nava presents today for follow-up visit.  Patient was last seen on 5/2/2023. He reports his leg pain is well-controlled with Gabapentin, he takes 900 mg in the morning and 900 mg at night. Continues to take Celebrex daily and Flexeril nightly with good relief, Norco sparingly for severe. Pain. Reports right sided pain in his  lower back that wraps around into his stomach x 3 weeks. Patient reports pain as 4/10 today.    Interval History (5/1/2023): Kendrick Nava presents today for follow-up visit.  he underwent bilateral L5/S1 TF JONATAN on 4/4/23.  The patient reports that he is/was better following the procedure.  he reports initially experienced worsening pain for about a week after the procedure, followed by  95% pain relief x 1 week before relief dwindled to 20%. Reports continued pressure in his lower back and pain across the lumbosacral region in a band like distribution with radiation into his right lower extremity along L5/S1 distribution. Takes Gabapentin and Flexeril nightly with good relief, no side effects. Norco for severe pain.  Patient reports pain as 6/10 today.    Interval HPI 03/13/2023  Kendrick Nava is a 47 y.o. male presents today for injection follow-up.  He was last seen for L5-S1 IL JONATAN on 02/02/2023.  He reports that this resulted in 80+ % relief for the initial 2 weeks, but now has decreased to about 50-60% relief.  He feels that the pain is of the same type and quality and is in the same location..    Interval History (1/13/2023):  Kendrick Nava presents today for follow-up visit.  Patient was last seen on Visit date not found. Patient reports pain as 5/10 today.  He has been performing physician directed exercises targeting sciatic nerve pain for several weeks without improvement. He reports continued pain in his thoracolumbar region but his worst pain is persistently along his lumbosacral region in a band-like distribution with radiation into his right lower extremity along L5/S1 distribution. Reports occasional radiation into his left leg. He installs italo for a living and reports prolonged bending and stooping worsens his symptoms. He reports by the end of the day he walks with a limp.      Initial HPI 08/29/2022  Kendrick Nava is a 47 y.o. male who presents to the clinic for the evaluation of  back pain.  He was referred by his primary care provider for further evaluation and management of this pain.  The pain started a few years ago following a fall from a ladder in 2018 and symptoms have been unchanged.The pain is located in the right thoracolumbar area and radiates to the lumbosacral region with occasional radiation into the left lower extremity extending to the foot and ankle.  The pain is described as aching, numbness, tingling burning and is rated as 6/10. The pain is rated with a score of  4/10 on the BEST day and a score of 8/10 on the WORST day.  Symptoms interfere with daily activity. The pain is exacerbated by work activities, prolonged standing or sitting.  The pain is mitigated by activity modification.       Non-Pharmacologic Treatments:  Physical Therapy/Home Exercise: yes  Ice/Heat:yes  TENS: no  Acupuncture: no  Massage: yes  Chiropractic: yes    Other: no      Pain Medications:  - Opioids:  Tramadol  - Adjuvant Medications:  Mobic  - Anti-Coagulants:  None     report:  Reviewed and consistent with medication use as prescribed.      Pain Procedures:   -12/07/2023:  Bilateral L5-S1 TFESI, 70% relief overall  -02/02/2023:  L5-S1 IL JONATAN, 80% relief for the initial 2 weeks with continued 50-60% relief  - bilateral L5/S1 TF JONATAN on 4/4/23 with 95% relief x 1 week, then 20% relief  -bilateral L5/S1 TFESI on 12/07/2023, 70% relief  - bilateral L5-S1 TFESI on 03/21/2024, 75% relief    Imaging:   Outside CT and MRI of the thoracic lumbar spine reviewed 2018    MRI lumbar spine 11/17/2023:  There are 5 non-rib-bearing lumbar vertebrae.  Alignment is unremarkable with no significant listhesis.  No acute fracture or compression deformity.  No aggressive focal signal abnormality.     Conus medullaris terminates at the L1 level.  Conus medullaris is normal in size and signal.     T12-L1: Mild disc desiccation and trace bulge.  No significant spinal canal or neural foraminal stenosis.     L1-L2: Mild  disc desiccation.  No significant spinal canal or neural foraminal stenosis.     L2-L3: Mild disc desiccation and trace bulge.  No significant spinal canal or neural foraminal stenosis.     L3-L4: Disc desiccation and small bulge.  Mild bilateral facet arthropathy.  No significant spinal canal stenosis.  Mild bilateral neural foraminal stenosis.     L4-L5: Disc desiccation and asymmetric to the left disc bulge.  Mild bilateral facet arthropathy.  No significant spinal canal stenosis.  Moderate left and mild right neural foraminal stenosis.     L5-S1: Disc desiccation and asymmetric to the right disc bulge.  Mild bilateral facet arthropathy.  No significant spinal canal stenosis although there is encroachment on the right lateral recess and descending right S1 nerve root.  No significant neural foraminal stenosis.     Paraspinal soft tissues are unremarkable.  Visualized intra-abdominal and pelvic contents are also unremarkable.    CT lumbar spine 12/02/2022     FINDINGS:  No acute fracture or dislocation.  Moderate disc height loss T11-12 and T12-L1.  Mild disc height loss at L1-2 through L4-5.  Mild facet arthropathy.  No significant endplate sclerosis.  Mild Schmorl's node formation T12-L1 and L1-2.     SI joints unremarkable.  Mild aortoiliac atherosclerotic calcification.     Impression:     No acute abnormality identified.  Chronic mild discogenic degenerative change as described above    CT lumbar spine 07/22/2018  1.  Acute, traumatic fractures involving the left L2, left L3, and left L4 transverse processes.     2.  Multilevel lumbar degenerative disc disease, spondylosis, and stenoses, as discussed above. This is probably most prominent at L5-S1 on the right, with there is mass effect on and posterior displacement of the right S1 nerve root within the lateral recess, with right lateral recess stenosis.    X-ray lumbar spine 12/02/2021:  The vertebral bodies of the lumbar spine demonstrate a normal height  and alignment.  There appears to be mild chronic vertebral body height loss at the T11 and T12 levels with some spondylosis noted at these levels.  The disc space heights appear to be relatively well maintained.  Mild spondylosis noted anteriorly at the L3-4 and to a lesser degree the L4-5 levels.  No pars defects are noted.       Past Medical History:   Diagnosis Date    Diabetes mellitus, type 2     Hyperlipidemia     Testosterone deficiency     LOW Testosterone     Past Surgical History:   Procedure Laterality Date    EPIDURAL STEROID INJECTION N/A 2/2/2023    Procedure: Lumbar L5/S1 IL JONATAN with right paramedian approach RN IV Sedation;  Surgeon: Nico Angeles MD;  Location: [facility];  Service: Pain Management;  Laterality: N/A;    SELECTIVE INJECTION OF ANESTHETIC AGENT AROUND LUMBAR SPINAL NERVE ROOT BY TRANSFORAMINAL APPROACH Bilateral 4/4/2023    Procedure: Bilateral L5/S1 TF JONATAN;  Surgeon: Nico Angeles MD;  Location: [facility];  Service: Pain Management;  Laterality: Bilateral;    SELECTIVE INJECTION OF ANESTHETIC AGENT AROUND LUMBAR SPINAL NERVE ROOT BY TRANSFORAMINAL APPROACH Bilateral 12/7/2023    Procedure: Bilateral L5/S1 TF JONATAN;  Surgeon: Nico Angeles MD;  Location: [facility];  Service: Pain Management;  Laterality: Bilateral;    SELECTIVE INJECTION OF ANESTHETIC AGENT AROUND LUMBAR SPINAL NERVE ROOT BY TRANSFORAMINAL APPROACH Bilateral 3/21/2024    Procedure: Bilateral L5/S1 TF JONATAN;  Surgeon: Nico Angeles MD;  Location: [facility];  Service: Pain Management;  Laterality: Bilateral;     Social History     Socioeconomic History    Marital status:    Tobacco Use    Smoking status: Some Days     Types: Vaping with nicotine    Smokeless tobacco: Never   Substance and Sexual Activity    Alcohol use: Yes     Alcohol/week: 1.0 standard drink of alcohol     Types: 1 Shots of liquor per week     Comment: once a month    Drug use: Never    Sexual activity: Yes      Partners: Male     Family History   Problem Relation Name Age of Onset    Diabetes type II Mother      Diabetes type II Father      Hypertension Father      Diabetes type II Brother      Cancer Maternal Grandmother      Diabetes Maternal Grandfather      No Known Problems Paternal Grandmother      Heart attack Paternal Grandfather         Review of patient's allergies indicates:  No Known Allergies      Current Outpatient Medications   Medication Sig    celecoxib (CELEBREX) 200 MG capsule TAKE 1 CAPSULE(200 MG) BY MOUTH TWICE DAILY    cyclobenzaprine (FLEXERIL) 10 MG tablet Take 1 tablet (10 mg total) by mouth every evening.    HYDROcodone-acetaminophen (NORCO) 5-325 mg per tablet Take 1 tablet by mouth every 12 (twelve) hours as needed for Pain.    HYDROcodone-acetaminophen (NORCO) 5-325 mg per tablet Take 1 tablet by mouth every 12 (twelve) hours as needed for Pain.    HYDROcodone-acetaminophen (NORCO) 5-325 mg per tablet Take 1 tablet by mouth every 12 (twelve) hours as needed for Pain.    metFORMIN (GLUCOPHAGE-XR) 750 MG ER 24hr tablet Take two tablets with breakfast and one in the evenings.    rosuvastatin (CRESTOR) 20 MG tablet TAKE 1 TABLET(20 MG) BY MOUTH EVERY DAY    semaglutide (OZEMPIC) 2 mg/dose (8 mg/3 mL) PnIj Inject 2 mg into the skin every 7 days.    testosterone cypionate (DEPO-TESTOSTERONE) 200 mg/mL injection Inject 1 mL (200 mg total) into the muscle every 7 days.    gabapentin (NEURONTIN) 600 MG tablet Take 2 tablets (1,200 mg total) by mouth 2 (two) times daily.     No current facility-administered medications for this visit.       Review of Systems     GENERAL:  No weight loss, malaise or fevers.  HEENT:   No recent changes in vision or hearing  NECK:  Negative for lumps, no difficulty with swallowing.  RESPIRATORY:  Negative for cough, wheezing or shortness of breath, patient denies any recent URI.  CARDIOVASCULAR:  Negative for chest pain, leg swelling or palpitations.  GI:  Negative for  "abdominal discomfort, blood in stools or black stools or change in bowel habits.  MUSCULOSKELETAL:  See HPI.  SKIN:  Negative for lesions, rash, and itching.  PSYCH:  No mood disorder or recent psychosocial stressors.  Patients sleep is not disturbed secondary to pain.  HEMATOLOGY/LYMPHOLOGY:  Negative for prolonged bleeding, bruising easily or swollen nodes.  Patient is not currently taking any anti-coagulants  NEURO:   No history of headaches, syncope, paralysis, seizures or tremors.  All other reviewed and negative other than HPI.    OBJECTIVE:    /75   Pulse 91   Resp 17   Ht 5' 11" (1.803 m)   Wt 114 kg (251 lb 5.2 oz)   BMI 35.05 kg/m²   Physical Exam    GENERAL: Well appearing, in no acute distress, alert and oriented x3.  PSYCH:  Mood and affect appropriate.  SKIN: Skin color, texture, turgor normal, no rashes or lesions.  HEAD/FACE:  Normocephalic, atraumatic. Cranial nerves grossly intact.  PULM: No evidence of respiratory difficulty, symmetric chest rise.  GI:  Soft and non-tender, negative heredia's sign, negative Mcburney's sign, negative CVA tenderness  BACK: Straight leg raising in the sitting and supine positions is positive to radicular pain on the left.   Moderate pain to palpation over the facet joints of the lumbar spine and lumbar paraspinals, mostly on the left.   Limited range of motion secondary to pain reproduction.  EXTREMITIES: No deformities, edema, or skin discoloration. Good capillary refill.  MUSCULOSKELETAL:  Hip and knee provocative maneuvers are negative.  There is no pain with palpation over the sacroiliac joints bilaterally. Moderated tenderness along right quadratus lumborum. FABERs test is negative.  FADIRs test is negative.   Bilateral upper and lower extremity strength is normal and symmetric with the exception of left-sided footdrop at 3/5.  No atrophy or tone abnormalities are noted.  NEURO: Positive Median nerve compression test, R>L.   GAIT:  slow, antalgic, " usha fernandez.      LABS:  Lab Results   Component Value Date    WBC 9.05 05/30/2024    HGB 15.0 05/30/2024    HCT 45.3 05/30/2024    MCV 86 05/30/2024     05/30/2024       CMP  Sodium   Date Value Ref Range Status   09/12/2024 139 136 - 145 mmol/L Final     Potassium   Date Value Ref Range Status   09/12/2024 4.7 3.5 - 5.1 mmol/L Final     Chloride   Date Value Ref Range Status   09/12/2024 103 95 - 110 mmol/L Final     CO2   Date Value Ref Range Status   09/12/2024 29 23 - 29 mmol/L Final     Glucose   Date Value Ref Range Status   09/12/2024 125 (H) 70 - 110 mg/dL Final     BUN   Date Value Ref Range Status   09/12/2024 12 6 - 20 mg/dL Final     Creatinine   Date Value Ref Range Status   09/12/2024 1.0 0.5 - 1.4 mg/dL Final     Calcium   Date Value Ref Range Status   09/12/2024 9.4 8.7 - 10.5 mg/dL Final     Total Protein   Date Value Ref Range Status   09/12/2024 6.8 6.0 - 8.4 g/dL Final     Albumin   Date Value Ref Range Status   09/12/2024 4.3 3.5 - 5.2 g/dL Final   11/04/2022 4.8 3.6 - 5.1 g/dL Final     Total Bilirubin   Date Value Ref Range Status   09/12/2024 0.4 0.1 - 1.0 mg/dL Final     Comment:     For infants and newborns, interpretation of results should be based  on gestational age, weight and in agreement with clinical  observations.    Premature Infant recommended reference ranges:  Up to 24 hours.............<8.0 mg/dL  Up to 48 hours............<12.0 mg/dL  3-5 days..................<15.0 mg/dL  6-29 days.................<15.0 mg/dL       Alkaline Phosphatase   Date Value Ref Range Status   09/12/2024 55 55 - 135 U/L Final     AST   Date Value Ref Range Status   09/12/2024 29 10 - 40 U/L Final     ALT   Date Value Ref Range Status   09/12/2024 43 10 - 44 U/L Final     Anion Gap   Date Value Ref Range Status   09/12/2024 7 (L) 8 - 16 mmol/L Final     eGFR if    Date Value Ref Range Status   06/06/2022 >60.0 >60 mL/min/1.73 m^2 Final     eGFR if non    Date  Value Ref Range Status   06/06/2022 >60.0 >60 mL/min/1.73 m^2 Final     Comment:     Calculation used to obtain the estimated glomerular filtration  rate (eGFR) is the CKD-EPI equation.          Lab Results   Component Value Date    HGBA1C 6.3 (H) 09/12/2024     ASSESSMENT: 49 y.o. year old male with chronic lower back pain, consistent with     1. Numbness and tingling of both upper extremities  EMG W/ ULTRASOUND AND NERVE CONDUCTION TEST 4 Extremities    gabapentin (NEURONTIN) 600 MG tablet      2. Lumbar radiculopathy  EMG W/ ULTRASOUND AND NERVE CONDUCTION TEST 4 Extremities    gabapentin (NEURONTIN) 600 MG tablet          PLAN:   - Interventions:  None at this time.     Advised patient he can message the clinic whenever he needs to schedule another JONATAN.    S/p repeat bilateral L5-S1 TFESI on 03/21/2024, 75% relief  s/p bilateral L5/S1 TF JONATAN on 12/07/2023 with 70% relief overall  -s/p bilateral L5/S1 TF JONATAN on 4/4/23 with 95% relief x 1 week, then 20% relief  s/p L5/S1 IL JONATAN with 80% relief with the initial 2 weeks.      - Anticoagulation use: no       - Medications: I have stressed the importance of physical activity and a home exercise plan to help with pain and improve health. and Patient can continue with medications for now since they are providing benefits, using them appropriately, and without side effects.     An opioid pain contract was completed on 06/12/2024 after having an extensive conversation about chronic opioid use for pain management. We discussed the risks and benefits of opioids.  I discouraged the patient from escalation of opioid use and try to minimize its use to decrease chance of dependence, tolerance, and opioid-based hyperalgesia.  I reviewed the  in great detail and there are no inconsistencies and it is appropriate with patient's history.  There are no signs of aberrant drug behavior.  The opioid risk tool was also completed.  Pt counseled about the side effects of long term use  of opioids including dependence, tolerance, addiction, respiratory depression, somnelence , immune and endocrine dysfunction.  The patient expressed understanding.         Will provide Norco 5/325 mg Q 12 p.r.n., 45 tablets.  Fill dates for 1/19/2025, 2/16/2025.  -Continue Gabapentin 1200 mg BID. Refill provided today.  -Continue Celebrex 100 mg BID for day time pain/inflammation  -Continue Flexeril 10 mg nightly     report:  Reviewed and consistent with medication use as prescribed.      - Therapy:  Advised patient continue with activities as tolerated    - Imaging: Reviewed available imaging with patient and answered any questions they had regarding study.      - Consults/Referrals:  Physiatry- [facility]    - Records:  Reviewed/Obtain old records from outside physicians and imaging    - Follow up visit:  RTC for EMG review      - Counseled patient regarding the importance of activity modification and physical therapy    - This condition does not require this patient to take time off of work, and the primary goal of our Pain Management services is to improve the patient's functional capacity.    - Patient Questions: Answered all of the patient's questions regarding diagnosis, therapy, and treatment        The above plan and management options were discussed at length with patient. Patient is in agreement with the above and verbalized understanding.    Visit today included increased complexity associated with the care of the episodic problem of chronic pain which was addressed and continue to manage the longitudinal care of the patient due to the serious and/or complex managed problem(s) listed above.    Urine Drug Screen- 9/26/2024: Compliant with Medication    Basil Carpio PA-C  Interventional Pain Management  [facility]

## 2025-01-03 ENCOUNTER — TELEPHONE (OUTPATIENT)
Dept: PHYSICAL MEDICINE AND REHAB | Facility: CLINIC | Age: 50
End: 2025-01-03
Payer: COMMERCIAL

## 2025-01-14 ENCOUNTER — PATIENT MESSAGE (OUTPATIENT)
Dept: PAIN MEDICINE | Facility: CLINIC | Age: 50
End: 2025-01-14
Payer: COMMERCIAL

## 2025-01-14 ENCOUNTER — OFFICE VISIT (OUTPATIENT)
Dept: PHYSICAL MEDICINE AND REHAB | Facility: CLINIC | Age: 50
End: 2025-01-14
Payer: COMMERCIAL

## 2025-01-14 VITALS — WEIGHT: 251.31 LBS | RESPIRATION RATE: 12 BRPM | BODY MASS INDEX: 35.18 KG/M2 | HEIGHT: 71 IN

## 2025-01-14 DIAGNOSIS — M47.816 LUMBAR SPONDYLOSIS: ICD-10-CM

## 2025-01-14 DIAGNOSIS — G56.03 BILATERAL CARPAL TUNNEL SYNDROME: ICD-10-CM

## 2025-01-14 DIAGNOSIS — M51.369 DDD (DEGENERATIVE DISC DISEASE), LUMBAR: ICD-10-CM

## 2025-01-14 DIAGNOSIS — M54.16 LUMBAR RADICULOPATHY: ICD-10-CM

## 2025-01-14 DIAGNOSIS — Z02.89 PAIN MANAGEMENT CONTRACT AGREEMENT: ICD-10-CM

## 2025-01-14 PROCEDURE — 95886 MUSC TEST DONE W/N TEST COMP: CPT | Mod: S$GLB,,, | Performed by: PHYSICAL MEDICINE & REHABILITATION

## 2025-01-14 PROCEDURE — 99999 PR PBB SHADOW E&M-EST. PATIENT-LVL III: CPT | Mod: PBBFAC,,, | Performed by: PHYSICAL MEDICINE & REHABILITATION

## 2025-01-14 PROCEDURE — 95885 MUSC TST DONE W/NERV TST LIM: CPT | Mod: 59,S$GLB,, | Performed by: PHYSICAL MEDICINE & REHABILITATION

## 2025-01-14 PROCEDURE — 95913 NRV CNDJ TEST 13/> STUDIES: CPT | Mod: S$GLB,,, | Performed by: PHYSICAL MEDICINE & REHABILITATION

## 2025-01-14 PROCEDURE — 99499 UNLISTED E&M SERVICE: CPT | Mod: S$GLB,,, | Performed by: PHYSICAL MEDICINE & REHABILITATION

## 2025-01-14 RX ORDER — HYDROCODONE BITARTRATE AND ACETAMINOPHEN 5; 325 MG/1; MG/1
1 TABLET ORAL EVERY 12 HOURS PRN
Qty: 60 TABLET | Refills: 0 | Status: SHIPPED | OUTPATIENT
Start: 2025-01-14

## 2025-01-14 NOTE — PROGRESS NOTES
[facility]  Department of Physiatry-EMG  [facility]   [address] [place] [address]         Full Name: Kendrick Nava YOB: 1975  Patient ID: [id number]      Visit Date: 1/14/2025 14:34  Age: 49 Years  Examining Physician: Dr Harvey  Referring Physician: HELENA Mendoza  Conclusion: upper and lower ext  Chief Complaint   Patient presents with    Numbness     Arms and legs       HPI: This is a 49 y.o.  male being seen in clinic today for evaluation of chronic numbness/tingling in his arms and legs with known cervical and lumbar DJD/DDD.  At night all his symptoms can worsen.  He has limited fine motor and  strength in his hands-worse on the right. He feels constant numbness at his right 2nd digit.  He has low back pain with radiation into his legs with associated weakness    History obtained from patient    Past family, medical, social, and surgical history reviewed in chart    Review of Systems:     General- denies lethargy, weight change, fever, chills  Head/neck- denies swallowing difficulties  ENT- denies hearing changes  Cardiovascular-denies chest pain  Pulmonary- denies shortness of breath  GI- denies constipation or bowel incontinence  - denies bladder incontinence  Skin- denies wounds or rashes  Musculoskeletal- denies weakness, + pain  Neurologic- + numbness and tingling  Psychiatric- denies depressive or psychotic features, denies anxiety  Lymphatic-denies swelling  Endocrine- denies hypoglycemic symptoms/+DM history  All other pertinent systems negative     Physical Examination:  General: Well developed, well nourished male, NAD  HEENT:NCAT EOMI bilaterally   Pulmonary:Normal respirations    Spinal Examination: CERVICAL  Active ROM is within normal limits.  Inspection: No deformity of spinal alignment.    Spinal Examination: LUMBAR or THORACIC  Active ROM is within normal limits.  Inspection: No deformity of spinal alignment.     Slr neg    Musculoskeletal Tests:  Phalen: neg  Elbow compression (ulnar): neg  Tinels at wrist: neg    Bilateral Upper and Lower Extremities:  Pulses are 2+ at radial bilaterally.  Shoulder/Elbow/Wrist/Hand ROM wnl  Hip/Knee/Ankle ROM wnl   Bilateral Extremities show normal capillary refill.  No signs of cyanosis, rubor, edema, skin changes, or dysvascular changes of appendages.  Nails appear intact.    Neurological Exam:  Cranial Nerves:  II-XII grossly intact    Manual Muscle Testing: (Motor 5=normal)  5/5 strength bilateral upper/lower extremities    No focal atrophy is noted of either upper or lower extremity.    Bilateral Reflexes:  Butt's response is absent bilaterally.  No clonus at knee or ankle.    Sensation: tested to light touch  - intact in all four limbs.    Gait: Narrow base and good arm swing.      Entire procedure explained to patient prior to proceeding.  Verbal consent obtained        Sensory NCS      Nerve / Sites Rec. Site Onset Lat Peak Lat NP Amp PP Amp Segments Distance Velocity     ms ms µV µV  mm m/s   R Median - Digit II (Antidromic)      Wrist Dig II 4.35 5.94 14.0 20.8 Wrist - Dig  32   L Median - Digit II (Antidromic)      Wrist Dig II 4.21 4.92 10.6 21.7 Wrist - Dig  33   R Ulnar - Digit V (Antidromic)      Wrist Dig V 2.60 3.77 11.9  Wrist - Dig V 110 42   L Ulnar - Digit V (Antidromic)      Wrist Dig V 2.54 3.71 10.2 10.9 Wrist - Dig V 140 55   R Radial - Anatomical snuff box (Forearm)      Forearm Wrist 2.10 2.71 12.9  Forearm - Wrist 100 48   L Radial - Anatomical snuff box (Forearm)      Forearm Wrist 1.90 2.75 13.4  Forearm - Wrist 100 53   R Sural - Ankle (Calf)      Calf Ankle 4.06 4.92 8.8 8.1 Calf - Ankle 140 34   R Superficial peroneal - Ankle      Lat leg Ankle 2.23 2.88 8.1 12.1 Lat leg - Ankle 140 63                       Motor NCS      Nerve / Sites Muscle Latency Amplitude Amp % Duration Segments Distance Lat Diff Velocity     ms mV % ms  mm ms m/s   R  Median - APB      Wrist APB 6.15 7.9 100 7.23 Wrist - APB 80        Elbow APB 11.60 7.7 97 7.71 Elbow - Wrist 240 5.46 44   L Median - APB      Wrist APB 5.50 9.8 100 7.08 Wrist - APB 80        Elbow APB 11.21 8.7 88.6 7.23 Elbow - Wrist 250 5.71 44   R Ulnar - ADM      Wrist ADM 3.48 7.4 100 6.62 Wrist - ADM 80        B.Elbow ADM 8.23 7.4 99.5 7.04 B.Elbow - Wrist 240 4.75 51      A.Elbow ADM 10.52 7.1 95.3 7.60 A.Elbow - B.Elbow 120 2.29 52   L Ulnar - ADM      Wrist ADM 3.42 9.0 100 7.00 Wrist - ADM 80        B.Elbow ADM 8.56 8.0 89.2 7.58 B.Elbow - Wrist 260 5.15 51      A.Elbow ADM 10.73 8.1 89.9 8.04 A.Elbow - B.Elbow 120 2.17 55   R Peroneal - EDB      Ankle EDB 4.21 7.4 100 6.27 Ankle - EDB 80        Fib head EDB 10.90 7.1 96.6 7.73 Fib head - Ankle 310 6.69 46      Pop fossa EDB 12.83 6.8 91.8 7.29 Pop fossa - Fib head 90 1.94 46   R Tibial - AH      Ankle AH 6.17 4.1 100 4.15 Ankle - AH 80        Pop fossa AH 15.17 3.1 75.9 6.35 Pop fossa - Ankle 420 9.00 47                   EMG          EMG Summary Table     Spontaneous MUAP Recruitment   Muscle Nerve Roots IA Fib PSW Fasc H.F. Amp Dur. PPP Pattern   R. Rectus femoris Femoral L2-L4 N None None None None N N N N   R. Extensor digitorum brevis Tibial L5-S1 N None None None None N N N N   R. Abductor hallucis Tibial S1-S2 1+ 1+ 1+ None None 1+ 1+ 1+ 1+   L. Extensor digitorum brevis Tibial L5-S1 1+ 1+ None None None N 1+ 1+ 1+   L. Abductor hallucis Tibial S1-S2 1+ None None None None N N 1+ 1+   R. First dorsal interosseous Ulnar C8-T1 N None None None None N N N N   R. Pronator teres Median C6-C7 N None None None None N N N N   R. Deltoid Axillary C5-C6 N None None None None N N N N       INTERPRETATION  -Bilateral median motor nerve conduction study showed prolonged latency, normal amplitude, and dec conduction velocity  -Bilateral median sensory nerve conduction study showed prolonged peak latency and normal amplitude  -Bilateral ulnar motor nerve  conduction study showed normal latency, amplitude, and conduction velocity  -Bilateral ulnar sensory nerve conduction study showed normal peak latency and amplitude  -Bilateral radial sensory nerve conduction study showed normal peak latency and amplitude  -Right superficial peroneal sensory nerve conduction study showed normal peak latency and amplitude  -Right sural sensory nerve conduction study showed prolonged peak latency and normal amplitude  -Right peroneal motor nerve conduction study showed prolonged latency, normal amplitude, and conduction velocity  -Right tibial motor nerve conduction study showed normal latency, amplitude, and conduction velocity  -Needle EMG examination performed to above mentioned muscles       IMPRESSION  ABNORMAL Study  2. There is electrodiagnostic evidence of a moderate demyelinating median neuropathy (Carpal tunnel syndrome) across bilateral wrists.  There is an acute on chronic radiculopathy of the right S1 and left L5 nerve roots and a subacute on chronic radiculopathy of the left S1 nerve root. There was no evidence of a cervical radiculopathy of the C5-T1 nerve roots     PLAN  Discussed in detail for greater than 30 minutes about diagnosis and treatment plan    1. Follow up with referring provider: Basil Carpio  2. Handouts on radic and CTS provided. Referral to ortho hand provided  3. This study is good for one year. If symptoms worsen or do not improve, please re-consult.    Linda Harvey M.D.  Physical Medicine and Rehab

## 2025-02-15 NOTE — TELEPHONE ENCOUNTER
Care Due:                  Date            Visit Type   Department     Provider  --------------------------------------------------------------------------------                                EP -                              PRIMARY      [facility] INTERNAL  Last Visit: 09-      CARE ([facility])   MEDICINE       Genevieve Mclaughlin  Next Visit: None Scheduled  None         None Found                                                            Last  Test          Frequency    Reason                     Performed    Due Date  --------------------------------------------------------------------------------    HBA1C.......  6 months...  metFORMIN, semaglutide...  09- 03-    [facility] Embedded Care Due Messages. Reference number: [id number].   2/15/2025 9:43:41 AM CST

## 2025-02-16 DIAGNOSIS — E34.9 TESTOSTERONE DEFICIENCY: Primary | ICD-10-CM

## 2025-02-17 RX ORDER — CELECOXIB 200 MG/1
200 CAPSULE ORAL 2 TIMES DAILY
Qty: 60 CAPSULE | Refills: 1 | Status: SHIPPED | OUTPATIENT
Start: 2025-02-17

## 2025-02-18 RX ORDER — TESTOSTERONE CYPIONATE 200 MG/ML
INJECTION, SOLUTION INTRAMUSCULAR
Qty: 5 ML | Refills: 1 | Status: SHIPPED | OUTPATIENT
Start: 2025-02-18

## 2025-03-02 DIAGNOSIS — M51.369 DDD (DEGENERATIVE DISC DISEASE), LUMBAR: ICD-10-CM

## 2025-03-02 DIAGNOSIS — M47.816 LUMBAR SPONDYLOSIS: ICD-10-CM

## 2025-03-02 DIAGNOSIS — M54.16 LUMBAR RADICULOPATHY: ICD-10-CM

## 2025-03-02 DIAGNOSIS — Z02.89 PAIN MANAGEMENT CONTRACT AGREEMENT: ICD-10-CM

## 2025-03-03 NOTE — TELEPHONE ENCOUNTER
Good Morning/Afternoon,     Pt is requesting for a refill of: Hydrocodone-acetaminophen 5-325 mg  Last filed: 1/14/2025  Last encounter: 12/31/2024  Up coming apt: N/a  Pharmacy: [id number] - [place], [place] - [address] AT [facility] [address]   Is this something you can do?  Jerome patient    Mya Matt  Medical Assistant

## 2025-03-04 RX ORDER — HYDROCODONE BITARTRATE AND ACETAMINOPHEN 5; 325 MG/1; MG/1
1 TABLET ORAL EVERY 12 HOURS PRN
Qty: 60 TABLET | Refills: 0 | Status: SHIPPED | OUTPATIENT
Start: 2025-03-04

## 2025-03-07 ENCOUNTER — OFFICE VISIT (OUTPATIENT)
Dept: PAIN MEDICINE | Facility: CLINIC | Age: 50
End: 2025-03-07
Payer: COMMERCIAL

## 2025-03-07 ENCOUNTER — TELEPHONE (OUTPATIENT)
Dept: PAIN MEDICINE | Facility: CLINIC | Age: 50
End: 2025-03-07
Payer: COMMERCIAL

## 2025-03-07 DIAGNOSIS — M54.9 DORSALGIA, UNSPECIFIED: ICD-10-CM

## 2025-03-07 DIAGNOSIS — M47.816 LUMBAR SPONDYLOSIS: ICD-10-CM

## 2025-03-07 DIAGNOSIS — M54.16 LUMBAR RADICULOPATHY: Primary | ICD-10-CM

## 2025-03-07 PROCEDURE — 98006 SYNCH AUDIO-VIDEO EST MOD 30: CPT | Mod: 95,,, | Performed by: PHYSICIAN ASSISTANT

## 2025-03-07 PROCEDURE — 3044F HG A1C LEVEL LT 7.0%: CPT | Mod: CPTII,95,, | Performed by: PHYSICIAN ASSISTANT

## 2025-03-07 RX ORDER — CHLORZOXAZONE 500 MG/1
500 TABLET ORAL 2 TIMES DAILY PRN
Qty: 60 TABLET | Refills: 1 | Status: SHIPPED | OUTPATIENT
Start: 2025-03-07

## 2025-03-07 RX ORDER — METFORMIN HYDROCHLORIDE 750 MG/1
TABLET, EXTENDED RELEASE ORAL
Qty: 90 TABLET | Refills: 0 | Status: SHIPPED | OUTPATIENT
Start: 2025-03-07

## 2025-03-07 NOTE — TELEPHONE ENCOUNTER
Called patient to schedule 3 month follow up from today's visit but patient didn't answer the phone LVM to call back at earliest convenience.    González LOMBARDO

## 2025-03-07 NOTE — PROGRESS NOTES
"Est Patient Chronic Pain Note (Follow up Visit)  The patient location is: home  The chief complaint leading to consultation is: Discuss medication refill     Visit type: audiovisual  Encounter which includes face to face time and non-face to face time preparing to see the patient (eg, review of tests), Obtaining and/or reviewing separately obtained history, Documenting clinical information in the electronic or other health record, Independently interpreting results (not separately reported) and communicating results to the patient/family/caregiver, or Care coordination (not separately reported).      Each patient to whom he or she provides medical services by telemedicine is:  (1) informed of the relationship between the physician and patient and the respective role of any other health care provider with respect to management of the patient; and (2) notified that he or she may decline to receive medical services by telemedicine and may withdraw from such care at any time.       SUBJECTIVE:    Interval History (3/7/2025):  Kendrick Nava presents today for follow-up visit.  Patient was last seen on 12/31/2024. He denies any significant changes. Reports improvement with Gabapentin. He does not get any relief with Flexeril.     He did complete EMG of upper extremities since his last visit.    Interval History (12/31/2024):   Kendrick Nava presents today for follow-up visit. He is here today for a 3 month follow up. Patient reports arms/ legs are "falling asleep." He has numbness/tingling from the proximal calf to the foot. Both hands are numb, however it is more pronounced with the R hand.    Patient continues to utilize Norco 5/325, Gabapentin, Celebrex and Flexeril. He requests refills on Norco and Gabapentin today.       Interval History (9/26/24) Kendrick Nava presents today for follow-up visit.  Patient was last seen on 6/12/2024.  Patient was suppose to follow up sooner but had to reschedule after " attending a  and being exposed to COVID.  Patient reports pain as 6/10 today. He is currently taking Gabapentin, Celebrex, Flexeril, and   Norco. Patient is tolerating medications well without any unwanted side effects. He does request refills for Norco only.  No new issues or concerns today.     Initial HPI (2024):  Kendrick Nava is a 49 y.o. male presents today for persistent lower back and leg pain.  He continues to have numbness and tingling in his left lower extremity with weakness with foot dorsiflexion..  He is s/p repeat bilateral L5-S1 TFESI that occurred on 2024.  He reports that this resulted in 75% relief relief that is waning in relief but still managing it well with current medication regimen of gabapentin, Celebrex, Flexeril, and  occasional use of Norco.  Current pain intensity is 4/10.  He is seeing outside neurosurgeon who discussed potential lumbar spine surgery.       Patient denies night fever/night sweats, urinary incontinence, bowel incontinence, significant weight loss, significant motor weakness, and loss of sensations.    Pain Disability Index Review:      2024    10:31 AM 2024    12:50 PM 2024     8:51 AM   Last 3 PDI Scores   Pain Disability Index (PDI) 42 42 35       Interval HPI 2024:  Kendrick Nava is a 48 y.o. male presents today for persistent lower back and leg pain.  He continues to have numbness and tingling in his left lower extremity with weakness with foot dorsiflexion..  He is s/p bilateral L5-S1 TFESI that occurred on 2023.  He reports that this resulted in 70% relief that is waning in relief.  Current pain intensity is 4/10.  He is seeing outside neurosurgeon who discussed potential lumbar spine surgery, but ultimately left it up to the patient for which the patient was unsure of what he would like to do at this time.    Interval HPI 2023:  Kendrick Nava is a 48 y.o. male presents today for follow-up chronic lower  back pain.   He reports that he has had severe worsening lower back pain and left lower extremity pain with weakness.  He reports that he had a fall off of a ladder due to this weakness, but denies any injuries prior to the initiation of this pain flare.  Current pain intensity is 6/10, but states it can increase to 10/10 at its worst.     Interval HPI 10/18/2023:  Kendrick Nava is a 47 y.o. male presents today for follow-up chronic lower back pain.  Current pain intensity is 4/10.  Reports that his pain is stable with use of gabapentin, Celebrex, Flexeril and Norco p.r.n..  He denies any adverse reactions to these medications he reports his pain is well managed with these medications.  He is able to complete his daily task and labor intensive job without any significant issues.    Interval History (7/18/2023):  Kendrick Nava presents today for follow-up visit.  Patient was last seen on 5/2/2023. He reports his leg pain is well-controlled with Gabapentin, he takes 900 mg in the morning and 900 mg at night. Continues to take Celebrex daily and Flexeril nightly with good relief, Norco sparingly for severe. Pain. Reports right sided pain in his lower back that wraps around into his stomach x 3 weeks. Patient reports pain as 4/10 today.    Interval History (5/1/2023): Kendrick Nava presents today for follow-up visit.  he underwent bilateral L5/S1 TF JONATAN on 4/4/23.  The patient reports that he is/was better following the procedure.  he reports initially experienced worsening pain for about a week after the procedure, followed by  95% pain relief x 1 week before relief dwindled to 20%. Reports continued pressure in his lower back and pain across the lumbosacral region in a band like distribution with radiation into his right lower extremity along L5/S1 distribution. Takes Gabapentin and Flexeril nightly with good relief, no side effects. Norco for severe pain.  Patient reports pain as 6/10 today.    Interval HPI  03/13/2023  Kendrick Nava is a 47 y.o. male presents today for injection follow-up.  He was last seen for L5-S1 IL JONATAN on 02/02/2023.  He reports that this resulted in 80+ % relief for the initial 2 weeks, but now has decreased to about 50-60% relief.  He feels that the pain is of the same type and quality and is in the same location..    Interval History (1/13/2023):  Kendrick Nava presents today for follow-up visit.  Patient was last seen on Visit date not found. Patient reports pain as 5/10 today.  He has been performing physician directed exercises targeting sciatic nerve pain for several weeks without improvement. He reports continued pain in his thoracolumbar region but his worst pain is persistently along his lumbosacral region in a band-like distribution with radiation into his right lower extremity along L5/S1 distribution. Reports occasional radiation into his left leg. He installs italo for a living and reports prolonged bending and stooping worsens his symptoms. He reports by the end of the day he walks with a limp.      Initial HPI 08/29/2022  Kendrick Nava is a 47 y.o. male who presents to the clinic for the evaluation of back pain.  He was referred by his primary care provider for further evaluation and management of this pain.  The pain started a few years ago following a fall from a ladder in 2018 and symptoms have been unchanged.The pain is located in the right thoracolumbar area and radiates to the lumbosacral region with occasional radiation into the left lower extremity extending to the foot and ankle.  The pain is described as aching, numbness, tingling burning and is rated as 6/10. The pain is rated with a score of  4/10 on the BEST day and a score of 8/10 on the WORST day.  Symptoms interfere with daily activity. The pain is exacerbated by work activities, prolonged standing or sitting.  The pain is mitigated by activity modification.       Non-Pharmacologic Treatments:  Physical  Therapy/Home Exercise: yes  Ice/Heat:yes  TENS: no  Acupuncture: no  Massage: yes  Chiropractic: yes    Other: no      Pain Medications:  - Opioids:  Tramadol  - Adjuvant Medications:  Mobic  - Anti-Coagulants:  None     report:  Reviewed and consistent with medication use as prescribed.      Pain Procedures:   -12/07/2023:  Bilateral L5-S1 TFESI, 70% relief overall  -02/02/2023:  L5-S1 IL JONATAN, 80% relief for the initial 2 weeks with continued 50-60% relief  - bilateral L5/S1 TF JONATAN on 4/4/23 with 95% relief x 1 week, then 20% relief  -bilateral L5/S1 TFESI on 12/07/2023, 70% relief  - bilateral L5-S1 TFESI on 03/21/2024, 75% relief    Imaging:     EMG 1/14/2025   IMPRESSION  ABNORMAL Study  2. There is electrodiagnostic evidence of a moderate demyelinating median neuropathy (Carpal tunnel syndrome) across bilateral wrists.  There is an acute on chronic radiculopathy of the right S1 and left L5 nerve roots and a subacute on chronic radiculopathy of the left S1 nerve root. There was no evidence of a cervical radiculopathy of the C5-T1 nerve roots    MRI lumbar spine 11/17/2023:  There are 5 non-rib-bearing lumbar vertebrae.  Alignment is unremarkable with no significant listhesis.  No acute fracture or compression deformity.  No aggressive focal signal abnormality.     Conus medullaris terminates at the L1 level.  Conus medullaris is normal in size and signal.     T12-L1: Mild disc desiccation and trace bulge.  No significant spinal canal or neural foraminal stenosis.     L1-L2: Mild disc desiccation.  No significant spinal canal or neural foraminal stenosis.     L2-L3: Mild disc desiccation and trace bulge.  No significant spinal canal or neural foraminal stenosis.     L3-L4: Disc desiccation and small bulge.  Mild bilateral facet arthropathy.  No significant spinal canal stenosis.  Mild bilateral neural foraminal stenosis.     L4-L5: Disc desiccation and asymmetric to the left disc bulge.  Mild bilateral facet  arthropathy.  No significant spinal canal stenosis.  Moderate left and mild right neural foraminal stenosis.     L5-S1: Disc desiccation and asymmetric to the right disc bulge.  Mild bilateral facet arthropathy.  No significant spinal canal stenosis although there is encroachment on the right lateral recess and descending right S1 nerve root.  No significant neural foraminal stenosis.     Paraspinal soft tissues are unremarkable.  Visualized intra-abdominal and pelvic contents are also unremarkable.    CT lumbar spine 12/02/2022     FINDINGS:  No acute fracture or dislocation.  Moderate disc height loss T11-12 and T12-L1.  Mild disc height loss at L1-2 through L4-5.  Mild facet arthropathy.  No significant endplate sclerosis.  Mild Schmorl's node formation T12-L1 and L1-2.     SI joints unremarkable.  Mild aortoiliac atherosclerotic calcification.     Impression:     No acute abnormality identified.  Chronic mild discogenic degenerative change as described above    CT lumbar spine 07/22/2018  1.  Acute, traumatic fractures involving the left L2, left L3, and left L4 transverse processes.     2.  Multilevel lumbar degenerative disc disease, spondylosis, and stenoses, as discussed above. This is probably most prominent at L5-S1 on the right, with there is mass effect on and posterior displacement of the right S1 nerve root within the lateral recess, with right lateral recess stenosis.    X-ray lumbar spine 12/02/2021:  The vertebral bodies of the lumbar spine demonstrate a normal height and alignment.  There appears to be mild chronic vertebral body height loss at the T11 and T12 levels with some spondylosis noted at these levels.  The disc space heights appear to be relatively well maintained.  Mild spondylosis noted anteriorly at the L3-4 and to a lesser degree the L4-5 levels.  No pars defects are noted.       Past Medical History:   Diagnosis Date    Diabetes mellitus, type 2     Hyperlipidemia     Testosterone  deficiency     LOW Testosterone     Past Surgical History:   Procedure Laterality Date    EPIDURAL STEROID INJECTION N/A 2/2/2023    Procedure: Lumbar L5/S1 IL JONATAN with right paramedian approach RN IV Sedation;  Surgeon: Nico Angeles MD;  Location: [facility];  Service: Pain Management;  Laterality: N/A;    SELECTIVE INJECTION OF ANESTHETIC AGENT AROUND LUMBAR SPINAL NERVE ROOT BY TRANSFORAMINAL APPROACH Bilateral 4/4/2023    Procedure: Bilateral L5/S1 TF JONATAN;  Surgeon: Nico Angeles MD;  Location: [facility];  Service: Pain Management;  Laterality: Bilateral;    SELECTIVE INJECTION OF ANESTHETIC AGENT AROUND LUMBAR SPINAL NERVE ROOT BY TRANSFORAMINAL APPROACH Bilateral 12/7/2023    Procedure: Bilateral L5/S1 TF JONATAN;  Surgeon: Nico Angeles MD;  Location: [facility];  Service: Pain Management;  Laterality: Bilateral;    SELECTIVE INJECTION OF ANESTHETIC AGENT AROUND LUMBAR SPINAL NERVE ROOT BY TRANSFORAMINAL APPROACH Bilateral 3/21/2024    Procedure: Bilateral L5/S1 TF JONATAN;  Surgeon: Nico Angeles MD;  Location: [facility];  Service: Pain Management;  Laterality: Bilateral;     Social History     Socioeconomic History    Marital status:    Tobacco Use    Smoking status: Some Days     Types: Vaping with nicotine    Smokeless tobacco: Never   Substance and Sexual Activity    Alcohol use: Yes     Alcohol/week: 1.0 standard drink of alcohol     Types: 1 Shots of liquor per week     Comment: once a month    Drug use: Never    Sexual activity: Yes     Partners: Male     Family History   Problem Relation Name Age of Onset    Diabetes type II Mother      Diabetes type II Father      Hypertension Father      Diabetes type II Brother      Cancer Maternal Grandmother      Diabetes Maternal Grandfather      No Known Problems Paternal Grandmother      Heart attack Paternal Grandfather         Review of patient's allergies indicates:  No Known Allergies      Current Outpatient Medications    Medication Sig    celecoxib (CELEBREX) 200 MG capsule TAKE 1 CAPSULE(200 MG) BY MOUTH TWICE DAILY    cyclobenzaprine (FLEXERIL) 10 MG tablet Take 1 tablet (10 mg total) by mouth every evening.    gabapentin (NEURONTIN) 600 MG tablet Take 2 tablets (1,200 mg total) by mouth 2 (two) times daily.    HYDROcodone-acetaminophen (NORCO) 5-325 mg per tablet Take 1 tablet by mouth every 12 (twelve) hours as needed for Pain.    metFORMIN (GLUCOPHAGE-XR) 750 MG ER 24hr tablet Take two tablets with breakfast and one in the evenings.    rosuvastatin (CRESTOR) 20 MG tablet TAKE 1 TABLET(20 MG) BY MOUTH EVERY DAY    semaglutide (OZEMPIC) 2 mg/dose (8 mg/3 mL) PnIj Inject 2 mg into the skin every 7 days.    testosterone cypionate (DEPOTESTOTERONE CYPIONATE) 200 mg/mL injection ADMINISTER 1ML(200 MG) INTO THE MUSCLE EVERY 7 DAYS     No current facility-administered medications for this visit.       Review of Systems     GENERAL:  No weight loss, malaise or fevers.  HEENT:   No recent changes in vision or hearing  NECK:  Negative for lumps, no difficulty with swallowing.  RESPIRATORY:  Negative for cough, wheezing or shortness of breath, patient denies any recent URI.  CARDIOVASCULAR:  Negative for chest pain, leg swelling or palpitations.  GI:  Negative for abdominal discomfort, blood in stools or black stools or change in bowel habits.  MUSCULOSKELETAL:  See HPI.  SKIN:  Negative for lesions, rash, and itching.  PSYCH:  No mood disorder or recent psychosocial stressors.  Patients sleep is not disturbed secondary to pain.  HEMATOLOGY/LYMPHOLOGY:  Negative for prolonged bleeding, bruising easily or swollen nodes.  Patient is not currently taking any anti-coagulants  NEURO:   No history of headaches, syncope, paralysis, seizures or tremors.  All other reviewed and negative other than HPI.    OBJECTIVE:  Telemedicine Physical Exam:   There were no vitals filed for this visit.  There is no height or weight on file to calculate BMI.    (reviewed on 3/7/2025)     (Physical exam performed virtually with patient guided on specific actions and diagnostic maneuvers)  GENERAL: Well appearing, in no acute distress, alert and oriented x3.  Cooperative.  PSYCH:  Mood and affect appropriate.  SKIN: Skin color & texture with no obvious abnormalities.    HEAD/FACE:  Normocephalic, atraumatic.    PULM:  No difficulty breathing. No nasal flaring. No obvious wheezing.  EXTREMITIES: No obvious deformities. Moving all extremities well, appears to have symmetric strength throughout.  MUSCULOSKELETAL: No obvious atrophy abnormalities are noted.   NEURO: No obvious neurologic deficit.   GAIT: sitting.     Physical Exam: last in clinic visit:    Physical Exam    GENERAL: Well appearing, in no acute distress, alert and oriented x3.  PSYCH:  Mood and affect appropriate.  SKIN: Skin color, texture, turgor normal, no rashes or lesions.  HEAD/FACE:  Normocephalic, atraumatic. Cranial nerves grossly intact.  PULM: No evidence of respiratory difficulty, symmetric chest rise.  GI:  Soft and non-tender, negative heredia's sign, negative Mcburney's sign, negative CVA tenderness  BACK: Straight leg raising in the sitting and supine positions is positive to radicular pain on the left.   Moderate pain to palpation over the facet joints of the lumbar spine and lumbar paraspinals, mostly on the left.   Limited range of motion secondary to pain reproduction.  EXTREMITIES: No deformities, edema, or skin discoloration. Good capillary refill.  MUSCULOSKELETAL:  Hip and knee provocative maneuvers are negative.  There is no pain with palpation over the sacroiliac joints bilaterally. Moderated tenderness along right quadratus lumborum. FABERs test is negative.  FADIRs test is negative.   Bilateral upper and lower extremity strength is normal and symmetric with the exception of left-sided footdrop at 3/5.  No atrophy or tone abnormalities are noted.  NEURO: Positive Median nerve  compression test, R>L.   GAIT:  slow, antalgic, steppage gait.      LABS:  Lab Results   Component Value Date    WBC 9.05 05/30/2024    HGB 15.0 05/30/2024    HCT 45.3 05/30/2024    MCV 86 05/30/2024     05/30/2024       CMP  Sodium   Date Value Ref Range Status   09/12/2024 139 136 - 145 mmol/L Final     Potassium   Date Value Ref Range Status   09/12/2024 4.7 3.5 - 5.1 mmol/L Final     Chloride   Date Value Ref Range Status   09/12/2024 103 95 - 110 mmol/L Final     CO2   Date Value Ref Range Status   09/12/2024 29 23 - 29 mmol/L Final     Glucose   Date Value Ref Range Status   09/12/2024 125 (H) 70 - 110 mg/dL Final     BUN   Date Value Ref Range Status   09/12/2024 12 6 - 20 mg/dL Final     Creatinine   Date Value Ref Range Status   09/12/2024 1.0 0.5 - 1.4 mg/dL Final     Calcium   Date Value Ref Range Status   09/12/2024 9.4 8.7 - 10.5 mg/dL Final     Total Protein   Date Value Ref Range Status   09/12/2024 6.8 6.0 - 8.4 g/dL Final     Albumin   Date Value Ref Range Status   09/12/2024 4.3 3.5 - 5.2 g/dL Final   11/04/2022 4.8 3.6 - 5.1 g/dL Final     Total Bilirubin   Date Value Ref Range Status   09/12/2024 0.4 0.1 - 1.0 mg/dL Final     Comment:     For infants and newborns, interpretation of results should be based  on gestational age, weight and in agreement with clinical  observations.    Premature Infant recommended reference ranges:  Up to 24 hours.............<8.0 mg/dL  Up to 48 hours............<12.0 mg/dL  3-5 days..................<15.0 mg/dL  6-29 days.................<15.0 mg/dL       Alkaline Phosphatase   Date Value Ref Range Status   09/12/2024 55 55 - 135 U/L Final     AST   Date Value Ref Range Status   09/12/2024 29 10 - 40 U/L Final     ALT   Date Value Ref Range Status   09/12/2024 43 10 - 44 U/L Final     Anion Gap   Date Value Ref Range Status   09/12/2024 7 (L) 8 - 16 mmol/L Final     eGFR if    Date Value Ref Range Status   06/06/2022 >60.0 >60 mL/min/1.73 m^2  Final     eGFR if non    Date Value Ref Range Status   06/06/2022 >60.0 >60 mL/min/1.73 m^2 Final     Comment:     Calculation used to obtain the estimated glomerular filtration  rate (eGFR) is the CKD-EPI equation.          Lab Results   Component Value Date    HGBA1C 6.3 (H) 09/12/2024     ASSESSMENT: 49 y.o. year old male with chronic lower back pain, consistent with     1. Lumbar radiculopathy  chlorzoxazone (PARAFON FORTE) 500 mg Tab      2. Lumbar spondylosis  chlorzoxazone (PARAFON FORTE) 500 mg Tab      3. Dorsalgia, unspecified  chlorzoxazone (PARAFON FORTE) 500 mg Tab        PLAN:   - Interventions:  None at this time.     Advised patient he can message the clinic whenever he needs to schedule another JONATAN.    S/p repeat bilateral L5-S1 TFESI on 03/21/2024, 75% relief  s/p bilateral L5/S1 TF JONATAN on 12/07/2023 with 70% relief overall  -s/p bilateral L5/S1 TF JONATAN on 4/4/23 with 95% relief x 1 week, then 20% relief  s/p L5/S1 IL JONATAN with 80% relief with the initial 2 weeks.      - Anticoagulation use: no       - Medications: I have stressed the importance of physical activity and a home exercise plan to help with pain and improve health. and Patient can continue with medications for now since they are providing benefits, using them appropriately, and without side effects.     An opioid pain contract was completed on 06/12/2024 after having an extensive conversation about chronic opioid use for pain management. We discussed the risks and benefits of opioids.  I discouraged the patient from escalation of opioid use and try to minimize its use to decrease chance of dependence, tolerance, and opioid-based hyperalgesia.  I reviewed the  in great detail and there are no inconsistencies and it is appropriate with patient's history.  There are no signs of aberrant drug behavior.  The opioid risk tool was also completed.  Pt counseled about the side effects of long term use of opioids including  dependence, tolerance, addiction, respiratory depression, somnelence , immune and endocrine dysfunction.  The patient expressed understanding.        Continue Norco 5/325 mg Q 12 p.r.n., 45 tablets. Last fill date 3/4/2025.  -Continue Gabapentin 1200 mg BID.   -Continue Celebrex 100 mg BID for day time pain/inflammation, per PCP. Patient states this helps tremendously with his hand discomfort.  -Stop Flexeril 10 mg nightly. Start Parafon Forte: Take 1 tablet (500 mg total) by mouth 2 (two) times daily as needed (muscle spasms). May cause drowsiness.,      report:  Reviewed and consistent with medication use as prescribed.      - Therapy:  Advised patient continue with activities as tolerated    - Imaging: Reviewed available imaging with patient and answered any questions they had regarding study.      - Consults/Referrals:  Consider referral to Ortho to discuss CTS injections.    - Records:  Reviewed/Obtain old records from outside physicians and imaging    - Follow up visit:  RTC 3 months or sooner if needed. Patient can message when ready to consider Lumbar JONATAN again.      - Counseled patient regarding the importance of activity modification and physical therapy    - This condition does not require this patient to take time off of work, and the primary goal of our Pain Management services is to improve the patient's functional capacity.    - Patient Questions: Answered all of the patient's questions regarding diagnosis, therapy, and treatment        The above plan and management options were discussed at length with patient. Patient is in agreement with the above and verbalized understanding.    Visit today included increased complexity associated with the care of the episodic problem of chronic pain which was addressed and continue to manage the longitudinal care of the patient due to the serious and/or complex managed problem(s) listed above.    Urine Drug Screen- 9/26/2024: Compliant with  Medication    Basil Carpio PA-C  Interventional Pain Management  [facility]

## 2025-03-07 NOTE — TELEPHONE ENCOUNTER
Provider Staff:  Action required for this patient    Requires labs      Please see care gap opportunities below in Care Due Message.    Thanks!  [facility]     Appointments      Date Provider   Last Visit   9/26/2024 Genevieve Mclaughlin MD   Next Visit   4/2/2025 Genevieve Mclaughlin MD     Refill Decision Note   Kendrick Nava  is requesting a refill authorization.  Brief Assessment and Rationale for Refill:  Approve     Medication Therapy Plan:        Comments:     Note composed:2:24 PM 03/07/2025

## 2025-03-07 NOTE — TELEPHONE ENCOUNTER
Care Due:                  Date            Visit Type   Department     Provider  --------------------------------------------------------------------------------                                EP -                              PRIMARY      CEN INTERNAL  Last Visit: 09-      CARE ([facility])   PAMELA Mclaughlin                              EP -                              PRIMARY      [facility] INTERNAL  Next Visit: 04-      CARE ([facility])   PAMELA Mclaughlin                                                            Last  Test          Frequency    Reason                     Performed    Due Date  --------------------------------------------------------------------------------    CBC.........  12 months..  celecoxib................  05- 05-    Lipid Panel.  12 months..  rosuvastatin.............  05- 05-    [facility] Embedded Care Due Messages. Reference number: [id number].   3/07/2025 11:22:27 AM CST

## 2025-03-20 ENCOUNTER — PATIENT MESSAGE (OUTPATIENT)
Dept: PAIN MEDICINE | Facility: CLINIC | Age: 50
End: 2025-03-20
Payer: COMMERCIAL

## 2025-03-26 ENCOUNTER — LAB VISIT (OUTPATIENT)
Dept: LAB | Facility: HOSPITAL | Age: 50
End: 2025-03-26
Attending: FAMILY MEDICINE
Payer: COMMERCIAL

## 2025-03-26 DIAGNOSIS — E11.9 TYPE 2 DIABETES MELLITUS WITHOUT COMPLICATION, WITHOUT LONG-TERM CURRENT USE OF INSULIN: ICD-10-CM

## 2025-03-26 DIAGNOSIS — E34.9 TESTOSTERONE DEFICIENCY: ICD-10-CM

## 2025-03-26 LAB
ABSOLUTE EOSINOPHIL (OHS): 0.24 K/UL
ABSOLUTE MONOCYTE (OHS): 0.39 K/UL (ref 0.3–1)
ABSOLUTE NEUTROPHIL COUNT (OHS): 3.78 K/UL (ref 1.8–7.7)
ALBUMIN SERPL BCP-MCNC: 4.1 G/DL (ref 3.5–5.2)
ALP SERPL-CCNC: 47 UNIT/L (ref 40–150)
ALT SERPL W/O P-5'-P-CCNC: 47 UNIT/L (ref 10–44)
ANION GAP (OHS): 9 MMOL/L (ref 8–16)
AST SERPL-CCNC: 45 UNIT/L (ref 11–45)
BASOPHILS # BLD AUTO: 0.07 K/UL
BASOPHILS NFR BLD AUTO: 1 %
BILIRUB SERPL-MCNC: 0.3 MG/DL (ref 0.1–1)
BUN SERPL-MCNC: 10 MG/DL (ref 6–20)
CALCIUM SERPL-MCNC: 8.7 MG/DL (ref 8.7–10.5)
CHLORIDE SERPL-SCNC: 103 MMOL/L (ref 95–110)
CHOLEST SERPL-MCNC: 125 MG/DL (ref 120–199)
CHOLEST/HDLC SERPL: 3.4 {RATIO} (ref 2–5)
CO2 SERPL-SCNC: 26 MMOL/L (ref 23–29)
CREAT SERPL-MCNC: 1 MG/DL (ref 0.5–1.4)
ERYTHROCYTE [DISTWIDTH] IN BLOOD BY AUTOMATED COUNT: 13.2 % (ref 11.5–14.5)
GFR SERPLBLD CREATININE-BSD FMLA CKD-EPI: >60 ML/MIN/1.73/M2
GLUCOSE SERPL-MCNC: 151 MG/DL (ref 70–110)
HCT VFR BLD AUTO: 48.7 % (ref 40–54)
HDLC SERPL-MCNC: 37 MG/DL (ref 40–75)
HDLC SERPL: 29.6 % (ref 20–50)
HGB BLD-MCNC: 16 GM/DL (ref 14–18)
IMM GRANULOCYTES # BLD AUTO: 0.01 K/UL (ref 0–0.04)
IMM GRANULOCYTES NFR BLD AUTO: 0.1 % (ref 0–0.5)
LDLC SERPL CALC-MCNC: 67.2 MG/DL (ref 63–159)
LYMPHOCYTES # BLD AUTO: 2.2 K/UL (ref 1–4.8)
MCH RBC QN AUTO: 28.2 PG (ref 27–50)
MCHC RBC AUTO-ENTMCNC: 32.9 G/DL (ref 32–36)
MCV RBC AUTO: 86 FL (ref 82–98)
NONHDLC SERPL-MCNC: 88 MG/DL
NUCLEATED RBC (/100WBC) (OHS): 0 /100 WBC
PLATELET # BLD AUTO: 265 K/UL (ref 150–450)
PMV BLD AUTO: 11 FL (ref 9.2–12.9)
POTASSIUM SERPL-SCNC: 4.3 MMOL/L (ref 3.5–5.1)
PROT SERPL-MCNC: 6.9 GM/DL (ref 6–8.4)
RBC # BLD AUTO: 5.67 M/UL (ref 4.6–6.2)
RELATIVE EOSINOPHIL (OHS): 3.6 %
RELATIVE LYMPHOCYTE (OHS): 32.9 % (ref 18–48)
RELATIVE MONOCYTE (OHS): 5.8 % (ref 4–15)
RELATIVE NEUTROPHIL (OHS): 56.6 % (ref 38–73)
SODIUM SERPL-SCNC: 138 MMOL/L (ref 136–145)
TRIGL SERPL-MCNC: 104 MG/DL (ref 30–150)
WBC # BLD AUTO: 6.69 K/UL (ref 3.9–12.7)

## 2025-03-26 PROCEDURE — 85025 COMPLETE CBC W/AUTO DIFF WBC: CPT

## 2025-03-26 PROCEDURE — 83036 HEMOGLOBIN GLYCOSYLATED A1C: CPT

## 2025-03-26 PROCEDURE — 80061 LIPID PANEL: CPT

## 2025-03-26 PROCEDURE — 36415 COLL VENOUS BLD VENIPUNCTURE: CPT | Mod: PO

## 2025-03-26 PROCEDURE — 80053 COMPREHEN METABOLIC PANEL: CPT

## 2025-03-26 PROCEDURE — 84403 ASSAY OF TOTAL TESTOSTERONE: CPT

## 2025-03-27 LAB
EAG (OHS): 148 MG/DL (ref 68–131)
HBA1C MFR BLD: 6.8 % (ref 4–5.6)
TESTOST SERPL-MCNC: 1202 NG/DL (ref 304–1227)

## 2025-04-02 ENCOUNTER — OFFICE VISIT (OUTPATIENT)
Dept: INTERNAL MEDICINE | Facility: CLINIC | Age: 50
End: 2025-04-02
Payer: COMMERCIAL

## 2025-04-02 VITALS
HEART RATE: 84 BPM | BODY MASS INDEX: 35.81 KG/M2 | OXYGEN SATURATION: 99 % | HEIGHT: 71 IN | DIASTOLIC BLOOD PRESSURE: 74 MMHG | SYSTOLIC BLOOD PRESSURE: 122 MMHG | TEMPERATURE: 98 F | WEIGHT: 255.75 LBS

## 2025-04-02 DIAGNOSIS — E34.9 TESTOSTERONE DEFICIENCY: ICD-10-CM

## 2025-04-02 DIAGNOSIS — E66.01 SEVERE OBESITY (BMI 35.0-39.9) WITH COMORBIDITY: ICD-10-CM

## 2025-04-02 DIAGNOSIS — E11.9 TYPE 2 DIABETES MELLITUS WITHOUT COMPLICATION, WITHOUT LONG-TERM CURRENT USE OF INSULIN: Primary | ICD-10-CM

## 2025-04-02 DIAGNOSIS — E78.5 HYPERLIPIDEMIA, UNSPECIFIED HYPERLIPIDEMIA TYPE: ICD-10-CM

## 2025-04-02 DIAGNOSIS — Z12.11 COLON CANCER SCREENING: ICD-10-CM

## 2025-04-02 DIAGNOSIS — K76.0 HEPATIC STEATOSIS: ICD-10-CM

## 2025-04-02 PROCEDURE — 1159F MED LIST DOCD IN RCRD: CPT | Mod: CPTII,S$GLB,, | Performed by: FAMILY MEDICINE

## 2025-04-02 PROCEDURE — 99214 OFFICE O/P EST MOD 30 MIN: CPT | Mod: S$GLB,,, | Performed by: FAMILY MEDICINE

## 2025-04-02 PROCEDURE — 99999 PR PBB SHADOW E&M-EST. PATIENT-LVL III: CPT | Mod: PBBFAC,,, | Performed by: FAMILY MEDICINE

## 2025-04-02 PROCEDURE — G2211 COMPLEX E/M VISIT ADD ON: HCPCS | Mod: S$GLB,,, | Performed by: FAMILY MEDICINE

## 2025-04-02 PROCEDURE — 3044F HG A1C LEVEL LT 7.0%: CPT | Mod: CPTII,S$GLB,, | Performed by: FAMILY MEDICINE

## 2025-04-02 PROCEDURE — 3078F DIAST BP <80 MM HG: CPT | Mod: CPTII,S$GLB,, | Performed by: FAMILY MEDICINE

## 2025-04-02 PROCEDURE — 3074F SYST BP LT 130 MM HG: CPT | Mod: CPTII,S$GLB,, | Performed by: FAMILY MEDICINE

## 2025-04-02 PROCEDURE — 3008F BODY MASS INDEX DOCD: CPT | Mod: CPTII,S$GLB,, | Performed by: FAMILY MEDICINE

## 2025-04-02 RX ORDER — METFORMIN HYDROCHLORIDE 750 MG/1
TABLET, EXTENDED RELEASE ORAL
Qty: 90 TABLET | Refills: 0 | Status: SHIPPED | OUTPATIENT
Start: 2025-04-02

## 2025-04-02 RX ORDER — ROSUVASTATIN CALCIUM 20 MG/1
20 TABLET, COATED ORAL DAILY
Qty: 90 TABLET | Refills: 3 | Status: SHIPPED | OUTPATIENT
Start: 2025-04-02

## 2025-04-02 RX ORDER — TESTOSTERONE CYPIONATE 200 MG/ML
200 INJECTION, SOLUTION INTRAMUSCULAR
Qty: 5 ML | Refills: 5 | Status: SHIPPED | OUTPATIENT
Start: 2025-04-02

## 2025-04-02 RX ORDER — TIRZEPATIDE 10 MG/.5ML
10 INJECTION, SOLUTION SUBCUTANEOUS
Qty: 4 ML | Refills: 0 | Status: SHIPPED | OUTPATIENT
Start: 2025-04-02

## 2025-04-02 NOTE — PROGRESS NOTES
Subjective:      Patient ID: Kendrick Nava is a 49 y.o. male.    Chief Complaint: Follow-up      History of Present Illness    CHIEF COMPLAINT:  Mr. Nava presents today for follow up    NEUROLOGICAL SYMPTOMS:  He experiences tingling and numbness in bilateral upper and lower extremities, severe enough to cause sleep disturbance. He underwent nerve conduction testing on his leg and expresses concern about possible neuropathy. He has not received any injections since March of last year.    HORMONE THERAPY:  He self-administers testosterone injections every Sunday. He reports difficulty assessing the effectiveness of testosterone therapy due to ongoing back issues affecting his motivation and activity levels. He feels capable but is hesitant to increase activity due to back discomfort.    DIABETES:  After initial appetite suppression with Ozempic, he reports increased appetite and eating following dose increase to 2 units. He denies any GI side effects. Fasting glucose was elevated with increased A1c.    LABS:  LDL was under 70, though patient reports eating prior to labs. Liver function tests showed mild elevation, though improved from previous results. Kidney function was normal. A1c controlled.       Past Medical History:   Diagnosis Date    Diabetes mellitus, type 2     Hyperlipidemia     Testosterone deficiency     LOW Testosterone          Past Surgical History:   Procedure Laterality Date    EPIDURAL STEROID INJECTION N/A 2/2/2023    Procedure: Lumbar L5/S1 IL JONATAN with right paramedian approach RN IV Sedation;  Surgeon: Nico Angeles MD;  Location: [facility] T;  Service: Pain Management;  Laterality: N/A;    SELECTIVE INJECTION OF ANESTHETIC AGENT AROUND LUMBAR SPINAL NERVE ROOT BY TRANSFORAMINAL APPROACH Bilateral 4/4/2023    Procedure: Bilateral L5/S1 TF JONATAN;  Surgeon: Nico Angeles MD;  Location: [facility];  Service: Pain Management;  Laterality: Bilateral;    SELECTIVE INJECTION OF  "ANESTHETIC AGENT AROUND LUMBAR SPINAL NERVE ROOT BY TRANSFORAMINAL APPROACH Bilateral 12/7/2023    Procedure: Bilateral L5/S1 TF JONATAN;  Surgeon: Nico Angeles MD;  Location: [facility];  Service: Pain Management;  Laterality: Bilateral;    SELECTIVE INJECTION OF ANESTHETIC AGENT AROUND LUMBAR SPINAL NERVE ROOT BY TRANSFORAMINAL APPROACH Bilateral 3/21/2024    Procedure: Bilateral L5/S1 TF JONATAN;  Surgeon: Nico Angeles MD;  Location: [facility];  Service: Pain Management;  Laterality: Bilateral;     Family History   Problem Relation Name Age of Onset    Diabetes type II Mother      Diabetes type II Father      Hypertension Father      Diabetes type II Brother      Cancer Maternal Grandmother      Diabetes Maternal Grandfather      No Known Problems Paternal Grandmother      Heart attack Paternal Grandfather       Social History[1]  Review of patient's allergies indicates:  No Known Allergies    Review of Systems   Constitutional:  Positive for malaise/fatigue. Negative for chills and fever.   HENT:  Negative for congestion.    Respiratory:  Negative for cough and shortness of breath.    Cardiovascular:  Negative for chest pain and palpitations.   Gastrointestinal:  Negative for abdominal pain.   Musculoskeletal:  Positive for back pain. Negative for myalgias.   Skin:  Negative for rash.   Neurological:  Positive for tingling.   Psychiatric/Behavioral:  The patient has insomnia.      Objective:       /74   Pulse 84   Temp 97.6 °F (36.4 °C) (Tympanic)   Ht 5' 11" (1.803 m)   Wt 116 kg (255 lb 11.7 oz)   SpO2 99%   BMI 35.67 kg/m²   Physical Exam             Physical Exam  Constitutional:       General: He is not in acute distress.     Appearance: Normal appearance. He is well-developed. He is not ill-appearing or diaphoretic.   Cardiovascular:      Rate and Rhythm: Normal rate and regular rhythm.      Heart sounds: Normal heart sounds.   Pulmonary:      Effort: Pulmonary effort is normal.      " Breath sounds: Normal breath sounds.   Musculoskeletal:      Right lower leg: No edema.      Left lower leg: No edema.   Neurological:      General: No focal deficit present.      Mental Status: He is alert and oriented to person, place, and time.   Psychiatric:         Mood and Affect: Mood normal.         Behavior: Behavior normal.         Thought Content: Thought content normal.         Judgment: Judgment normal.         Assessment:     1. Type 2 diabetes mellitus without complication, without long-term current use of insulin    2. Hyperlipidemia, unspecified hyperlipidemia type    3. Hepatic steatosis    4. Testosterone deficiency    5. Colon cancer screening    6. Severe obesity (BMI 35.0-39.9) with comorbidity      Plan:   Assessment & Plan    - Mr. Nava reported tingling and numbness in arms and legs, potentially indicative of diabetic polyneuropathy.  - Discontinued Ozempic and switched to Mounjaro for better glucose control and tolerability.  - Prescribed Mounjaro starting at an intermediate dose with a plan to increase: 10 mg next month, then 12 mg, and finally 15 mg as tolerated.  - Instructed the patient to contact the office in about 3 weeks before needing Mounjaro refill to report tolerability and discuss potential dose increase.  - Acknowledged that the patient forgot about fasting before the blood test, which may have affected glucose levels but not A1c.    - Mr. Nava reported ongoing back issues affecting activity levels and mood.  - Confirmed that the patient is waiting for approval for an injection to manage back pain.  - Noted that the last injection was administered in March of the previous year.    - Reviewed testosterone levels, noting at upper end of normal range midweek and explained that levels are typically higher in the morning.  - Considered potential need to adjust testosterone dosage if levels continue to rise.  - Return colon cancer screening kit to hospital lab when  completed.  - Continued testosterone injections at current dose and frequency (once weekly on Sundays).  - Ordered colon cancer screening kit for at-home use.  - Follow up with labs in 6 months.        Type 2 diabetes mellitus without complication, without long-term current use of insulin  -     tirzepatide (MOUNJARO) 10 mg/0.5 mL PnIj; Inject 10 mg into the skin every 7 days.  Dispense: 4 mL; Refill: 0  -     Hemoglobin A1C; Future; Expected date: 09/29/2025  -     Lipid Panel; Future; Expected date: 09/29/2025  -     Comprehensive Metabolic Panel; Future; Expected date: 09/29/2025  -     CBC Auto Differential; Future; Expected date: 09/29/2025  -     Testosterone; Future; Expected date: 09/29/2025    Hyperlipidemia, unspecified hyperlipidemia type    Hepatic steatosis    Testosterone deficiency  -     testosterone cypionate (DEPOTESTOTERONE CYPIONATE) 200 mg/mL injection; Inject 1 mL (200 mg total) into the muscle every 7 days.  Dispense: 5 mL; Refill: 5  -     Testosterone; Future; Expected date: 09/29/2025    Colon cancer screening  -     Fecal Immunochemical Test (iFOBT); Future; Expected date: 04/02/2025    Severe obesity (BMI 35.0-39.9) with comorbidity    Other orders  -     metFORMIN (GLUCOPHAGE-XR) 750 MG ER 24hr tablet; TAKE 2 TABLETS BY MOUTH DAILY WITH BREAKFAST AND 1 TABLET IN THE EVENING  Dispense: 90 tablet; Refill: 0  -     rosuvastatin (CRESTOR) 20 MG tablet; Take 1 tablet (20 mg total) by mouth once daily.  Dispense: 90 tablet; Refill: 3    Continue all other current medications.   Above labs in 6 months prior to visit with me.    Medication List with Changes/Refills   New Medications    TIRZEPATIDE (MOUNJARO) 10 MG/0.5 ML PNIJ    Inject 10 mg into the skin every 7 days.   Current Medications    CELECOXIB (CELEBREX) 200 MG CAPSULE    TAKE 1 CAPSULE(200 MG) BY MOUTH TWICE DAILY    CHLORZOXAZONE (PARAFON FORTE) 500 MG TAB    Take 1 tablet (500 mg total) by mouth 2 (two) times daily as needed (muscle  spasms). May cause drowsiness.    CYCLOBENZAPRINE (FLEXERIL) 10 MG TABLET    Take 1 tablet (10 mg total) by mouth every evening.    GABAPENTIN (NEURONTIN) 600 MG TABLET    Take 2 tablets (1,200 mg total) by mouth 2 (two) times daily.    HYDROCODONE-ACETAMINOPHEN (NORCO) 5-325 MG PER TABLET    Take 1 tablet by mouth every 12 (twelve) hours as needed for Pain.    SEMAGLUTIDE (OZEMPIC) 2 MG/DOSE (8 MG/3 ML) PNIJ    Inject 2 mg into the skin every 7 days.   Changed and/or Refilled Medications    Modified Medication Previous Medication    METFORMIN (GLUCOPHAGE-XR) 750 MG ER 24HR TABLET metFORMIN (GLUCOPHAGE-XR) 750 MG ER 24hr tablet       TAKE 2 TABLETS BY MOUTH DAILY WITH BREAKFAST AND 1 TABLET IN THE EVENING    TAKE 2 TABLETS BY MOUTH DAILY WITH BREAKFAST AND 1 TABLET IN THE EVENING    ROSUVASTATIN (CRESTOR) 20 MG TABLET rosuvastatin (CRESTOR) 20 MG tablet       Take 1 tablet (20 mg total) by mouth once daily.    TAKE 1 TABLET(20 MG) BY MOUTH EVERY DAY    TESTOSTERONE CYPIONATE (DEPOTESTOTERONE CYPIONATE) 200 MG/ML INJECTION testosterone cypionate (DEPOTESTOTERONE CYPIONATE) 200 mg/mL injection       Inject 1 mL (200 mg total) into the muscle every 7 days.    ADMINISTER 1ML(200 MG) INTO THE MUSCLE EVERY 7 DAYS       This note was generated with the assistance of ambient listening technology. Verbal consent was obtained by the patient and accompanying visitor(s) for the recording of patient appointment to facilitate this note. I attest to having reviewed and edited the generated note for accuracy, though some syntax or spelling errors may persist. Please contact the author of this note for any clarification.          [1]   Social History  Socioeconomic History    Marital status:    Tobacco Use    Smoking status: Some Days     Types: Vaping with nicotine    Smokeless tobacco: Never   Substance and Sexual Activity    Alcohol use: Yes     Alcohol/week: 1.0 standard drink of alcohol     Types: 1 Shots of liquor per  week     Comment: once a month    Drug use: Never    Sexual activity: Yes     Partners: Male

## 2025-04-09 NOTE — PRE-PROCEDURE INSTRUCTIONS
Spoke with patient regarding procedure scheduled on 4.24     Arrival time 0615     Has patient been sick with fever or on antibiotics within the last 7 days? No     Does the patient have any open wounds, sores or rashes? No     Does the patient have any recent fractures? no     Has patient received a vaccination within the last 7 days? No     Received the COVID vaccination? yes     Has the patient stopped all medications as directed? na     Does patient have a pacemaker, defibrillator, or implantable stimulator? No     Does the patient have a ride to and from procedure and someone reliable to remain with patient?  WIFE     Is the patient diabetic? YES     Does the patient have sleep apnea? Or use O2 at home? no     Is the patient receiving sedation? yes     Is the patient instructed to remain NPO beginning at midnight the night before their procedure? yes     Procedure location confirmed with patient? Yes     Covid- Denies signs/symptoms. Instructed to notify PAT/MD if any changes.

## 2025-04-16 RX ORDER — CELECOXIB 200 MG/1
200 CAPSULE ORAL 2 TIMES DAILY
Qty: 60 CAPSULE | Refills: 1 | Status: SHIPPED | OUTPATIENT
Start: 2025-04-16

## 2025-04-16 NOTE — TELEPHONE ENCOUNTER
No care due was identified.  [facility] Embedded Care Due Messages. Reference number: [id number].   4/16/2025 3:49:34 AM CDT

## 2025-04-24 ENCOUNTER — HOSPITAL ENCOUNTER (OUTPATIENT)
Facility: HOSPITAL | Age: 50
Discharge: HOME | End: 2025-04-24
Attending: PHYSICAL MEDICINE & REHABILITATION | Admitting: PHYSICAL MEDICINE & REHABILITATION
Payer: COMMERCIAL

## 2025-04-24 VITALS
RESPIRATION RATE: 18 BRPM | HEIGHT: 71 IN | DIASTOLIC BLOOD PRESSURE: 70 MMHG | OXYGEN SATURATION: 97 % | WEIGHT: 253.5 LBS | SYSTOLIC BLOOD PRESSURE: 127 MMHG | TEMPERATURE: 97 F | BODY MASS INDEX: 35.49 KG/M2 | HEART RATE: 78 BPM

## 2025-04-24 DIAGNOSIS — M54.16 LUMBAR RADICULOPATHY: Primary | ICD-10-CM

## 2025-04-24 LAB — POCT GLUCOSE: 117 MG/DL (ref 70–110)

## 2025-04-24 PROCEDURE — 25500020 PHARM REV CODE 255: Performed by: PHYSICAL MEDICINE & REHABILITATION

## 2025-04-24 PROCEDURE — 63600175 PHARM REV CODE 636 W HCPCS: Mod: JZ,TB | Performed by: PHYSICAL MEDICINE & REHABILITATION

## 2025-04-24 PROCEDURE — 82962 GLUCOSE BLOOD TEST: CPT | Performed by: PHYSICAL MEDICINE & REHABILITATION

## 2025-04-24 PROCEDURE — 64483 NJX AA&/STRD TFRM EPI L/S 1: CPT | Mod: 50 | Performed by: PHYSICAL MEDICINE & REHABILITATION

## 2025-04-24 PROCEDURE — 64483 NJX AA&/STRD TFRM EPI L/S 1: CPT | Mod: 50,,, | Performed by: PHYSICAL MEDICINE & REHABILITATION

## 2025-04-24 RX ORDER — MIDAZOLAM HYDROCHLORIDE 1 MG/ML
INJECTION, SOLUTION INTRAMUSCULAR; INTRAVENOUS
Status: DISCONTINUED | OUTPATIENT
Start: 2025-04-24 | End: 2025-04-24 | Stop reason: HOSPADM

## 2025-04-24 RX ORDER — ONDANSETRON HYDROCHLORIDE 2 MG/ML
4 INJECTION, SOLUTION INTRAVENOUS ONCE AS NEEDED
Status: DISCONTINUED | OUTPATIENT
Start: 2025-04-24 | End: 2025-04-24 | Stop reason: HOSPADM

## 2025-04-24 RX ORDER — METHYLPREDNISOLONE ACETATE 40 MG/ML
INJECTION, SUSPENSION INTRA-ARTICULAR; INTRALESIONAL; INTRAMUSCULAR; SOFT TISSUE
Status: DISCONTINUED | OUTPATIENT
Start: 2025-04-24 | End: 2025-04-24 | Stop reason: HOSPADM

## 2025-04-24 RX ORDER — BUPIVACAINE HYDROCHLORIDE 2.5 MG/ML
INJECTION, SOLUTION EPIDURAL; INFILTRATION; INTRACAUDAL; PERINEURAL
Status: DISCONTINUED | OUTPATIENT
Start: 2025-04-24 | End: 2025-04-24 | Stop reason: HOSPADM

## 2025-04-24 RX ORDER — FENTANYL CITRATE 50 UG/ML
INJECTION, SOLUTION INTRAMUSCULAR; INTRAVENOUS
Status: DISCONTINUED | OUTPATIENT
Start: 2025-04-24 | End: 2025-04-24 | Stop reason: HOSPADM

## 2025-04-24 NOTE — DISCHARGE INSTRUCTIONS

## 2025-04-24 NOTE — PLAN OF CARE
Pt discharged home, awake, alert, oriented x's 4, no apparent distress noted. All questions and concerns addressed and answered, pt verbalizes understanding of discharge process, pt meets discharge criteria and is being discharged to car via wheelchair.

## 2025-04-24 NOTE — DISCHARGE SUMMARY
Discharge Note  Short Stay      SUMMARY     Admit Date: 4/24/2025    Attending Physician: Nico Angeles MD        Discharge Physician: Nico Angeles MD        Discharge Date: 4/24/2025 7:32 AM    Procedure(s) (LRB):  Bilateral Lumbar L5/S1 TF JONATAN (Bilateral)    Final Diagnosis: Lumbar radiculopathy [M54.16]  Lumbar spondylosis [M47.816]  Dorsalgia, unspecified [M54.9]  Degeneration of intervertebral disc of lumbar region with discogenic back pain and lower extremity pain [M51.362]    Disposition: Home or self care    Patient Instructions:   Current Discharge Medication List        CONTINUE these medications which have NOT CHANGED    Details   celecoxib (CELEBREX) 200 MG capsule TAKE 1 CAPSULE(200 MG) BY MOUTH TWICE DAILY  Qty: 60 capsule, Refills: 1      chlorzoxazone (PARAFON FORTE) 500 mg Tab Take 1 tablet (500 mg total) by mouth 2 (two) times daily as needed (muscle spasms). May cause drowsiness.  Qty: 60 tablet, Refills: 1    Associated Diagnoses: Lumbar radiculopathy; Lumbar spondylosis; Dorsalgia, unspecified      cyclobenzaprine (FLEXERIL) 10 MG tablet Take 1 tablet (10 mg total) by mouth every evening.  Qty: 90 tablet, Refills: 3    Associated Diagnoses: Lumbar radiculopathy; Chronic bilateral low back pain without sciatica      gabapentin (NEURONTIN) 600 MG tablet Take 2 tablets (1,200 mg total) by mouth 2 (two) times daily.  Qty: 120 tablet, Refills: 11    Associated Diagnoses: Numbness and tingling of both upper extremities; Lumbar radiculopathy      HYDROcodone-acetaminophen (NORCO) 5-325 mg per tablet Take 1 tablet by mouth every 12 (twelve) hours as needed for Pain.  Qty: 60 tablet, Refills: 0    Comments: Greater than 7 day supply medically necessary.  Associated Diagnoses: Lumbar radiculopathy; Lumbar spondylosis; DDD (degenerative disc disease), lumbar; Pain management contract agreement      metFORMIN (GLUCOPHAGE-XR) 750 MG ER 24hr tablet TAKE 2 TABLETS BY MOUTH DAILY WITH BREAKFAST AND 1  TABLET IN THE EVENING  Qty: 90 tablet, Refills: 0      rosuvastatin (CRESTOR) 20 MG tablet Take 1 tablet (20 mg total) by mouth once daily.  Qty: 90 tablet, Refills: 3      testosterone cypionate (DEPOTESTOTERONE CYPIONATE) 200 mg/mL injection Inject 1 mL (200 mg total) into the muscle every 7 days.  Qty: 5 mL, Refills: 5    Associated Diagnoses: Testosterone deficiency      tirzepatide (MOUNJARO) 10 mg/0.5 mL PnIj Inject 10 mg into the skin every 7 days.  Qty: 4 mL, Refills: 0    Associated Diagnoses: Type 2 diabetes mellitus without complication, without long-term current use of insulin                 Discharge Diagnosis: Lumbar radiculopathy [M54.16]  Lumbar spondylosis [M47.816]  Dorsalgia, unspecified [M54.9]  Degeneration of intervertebral disc of lumbar region with discogenic back pain and lower extremity pain [M51.362]  Condition on Discharge: Stable with no complications to procedure   Diet on Discharge: Same as before.  Activity: as per instruction sheet.  Discharge to: Home with a responsible adult.  Follow up: 2-4 weeks       Please call the office at [phone] if you experience any weakness or loss of sensation, fever > 101.5, pain uncontrolled with oral medications, persistent nausea/vomiting/or diarrhea, redness or drainage from the incisions, or any other worrisome concerns. If physician on call was not reached or could not communicate with our office for any reason please go to the nearest emergency department

## 2025-04-24 NOTE — OP NOTE
INFORMED CONSENT: The procedure, risks, benefits and options were discussed with patient. There are no contraindications to the procedure. The patient expressed understanding and agreed to proceed. The personnel performing the procedure was discussed.    04/24/2025    Surgeon: Nico Angeles MD    Assistants: None    SEDATION: Conscious sedation provided by M.D    The patient was monitored with continuous pulse oximetry, EKG, and intermittent blood pressure monitors, immediately prior to administration of sedation.  The patient was hemodynamically stable throughout the entire process was responsive to voice, and breathing spontaneously.  Supplemental O2 was provided at 2L/min via nasal cannula.  Patient was comfortable for the duration of the procedure.     There was a total of 2mg IV Midazolam and 75mcg Fentanyl titrated for the procedure    Total sedation time was >10minutes and <20minutes      PROCEDURE:  Bilateral  L5/S1  1) Left  L5/S1 TRANSFORAMINAL EPIDURAL STEROID INJECTION  2) Right  L5/S1 TRANSFORAMINAL EPIDURAL STEROID INJECTION      Pre Procedure diagnosis:  Bilateral L5/S1 Lumbar radiculopathy [M54.16]  Lumbar spondylosis [M47.816]  Dorsalgia, unspecified [M54.9]  Degeneration of intervertebral disc of lumbar region with discogenic back pain and lower extremity pain [M51.362]    Post-Procedure diagnosis:   same    Complications: None    Specimens: None      DESCRIPTION OF PROCEDURE: The patient was brought to the procedure room. IV access was obtained prior to the procedure. The patient was positioned prone on the fluoroscopy table. Continuous hemodynamic monitoring was initiated including blood pressure, EKG, and pulse oximetry. . The skin was prepped with chlorhexidine and draped in a sterile fashion. Skin anesthesia was achieved using a total of 10mL of lidocaine, 5mL over each respective injection site.     The  L5/S1 transforaminal spaces were identified with fluoroscopy in the  AP, oblique, and  lateral views.  A 22 gauge spinal quinke needle was then advanced into the area of the trans foraminal spaces bilaterally with confirmation of proper needle position using AP, oblique, and lateral fluoroscopic views. Once the needle tip was in the area of the transforaminal space, and there was no blood, CSF or paraesthesias,  1.5 mL of Omnipaque 300mg/ml was injected on each side for a total of 3mL.  Fluoroscopic imaging in the AP and lateral views revealed a clear outline of the spinal nerve with proximal spread of agent through the neural foramen into the epidural space. A total combination of 1 mL of Bupivicaine 0.25% and 40 mg depo medrol was injected on each side for a total of 4mL of injected medications with displacement of the contrast dye confirming that the medication went into the area of the transforaminal spaces bilaterally. A sterile dressing was applied.   Patient tolerated the procedure well.    Patient was taken back to the recovery room for further observation.     The patient was discharged to home in stable condition

## 2025-04-24 NOTE — H&P
"HPI  Patient presenting for Procedure(s) (LRB):  Bilateral Lumbar L5/S1 TF JONATAN (Bilateral)     No health changes since previous encounter    Past Medical History:   Diagnosis Date    Diabetes mellitus, type 2     Hyperlipidemia     Testosterone deficiency     LOW Testosterone     Past Surgical History:   Procedure Laterality Date    EPIDURAL STEROID INJECTION N/A 2/2/2023    Procedure: Lumbar L5/S1 IL JONATAN with right paramedian approach RN IV Sedation;  Surgeon: Nico Angeles MD;  Location: [facility];  Service: Pain Management;  Laterality: N/A;    SELECTIVE INJECTION OF ANESTHETIC AGENT AROUND LUMBAR SPINAL NERVE ROOT BY TRANSFORAMINAL APPROACH Bilateral 4/4/2023    Procedure: Bilateral L5/S1 TF JONATAN;  Surgeon: Nico Angeles MD;  Location: [facility];  Service: Pain Management;  Laterality: Bilateral;    SELECTIVE INJECTION OF ANESTHETIC AGENT AROUND LUMBAR SPINAL NERVE ROOT BY TRANSFORAMINAL APPROACH Bilateral 12/7/2023    Procedure: Bilateral L5/S1 TF JONATAN;  Surgeon: Nico Angeles MD;  Location: [facility];  Service: Pain Management;  Laterality: Bilateral;    SELECTIVE INJECTION OF ANESTHETIC AGENT AROUND LUMBAR SPINAL NERVE ROOT BY TRANSFORAMINAL APPROACH Bilateral 3/21/2024    Procedure: Bilateral L5/S1 TF JONATAN;  Surgeon: Nico Angeles MD;  Location: [facility];  Service: Pain Management;  Laterality: Bilateral;     Review of patient's allergies indicates:  No Known Allergies     Medications Ordered Prior to Encounter[1]     PMHx, PSHx, Allergies, Medications reviewed in epic    ROS negative except pain complaints in HPI    OBJECTIVE:    /66 (BP Location: Right arm, Patient Position: Sitting)   Pulse 84   Temp 96.9 °F (36.1 °C)   Resp 18   Ht 5' 11" (1.803 m)   Wt 115 kg (253 lb 8.5 oz)   SpO2 100%   BMI 35.36 kg/m²     PHYSICAL EXAMINATION:    GENERAL: Well appearing, in no acute distress, alert and oriented x3.  PSYCH:  Mood and affect appropriate.  SKIN: Skin color, " texture, turgor normal, no rashes or lesions which will impact the procedure.  CV: RRR with palpation of the radial artery.  PULM: No evidence of respiratory difficulty, symmetric chest rise. Clear to auscultation.  NEURO: Cranial nerves grossly intact.    Plan:    Proceed with procedure as planned Procedure(s) (LRB):  Bilateral Lumbar L5/S1 TF JONATAN (Bilateral)    Nico Angeles MD  04/24/2025                 [1]   No current facility-administered medications on file prior to encounter.     Current Outpatient Medications on File Prior to Encounter   Medication Sig Dispense Refill    chlorzoxazone (PARAFON FORTE) 500 mg Tab Take 1 tablet (500 mg total) by mouth 2 (two) times daily as needed (muscle spasms). May cause drowsiness. 60 tablet 1    cyclobenzaprine (FLEXERIL) 10 MG tablet Take 1 tablet (10 mg total) by mouth every evening. 90 tablet 3    gabapentin (NEURONTIN) 600 MG tablet Take 2 tablets (1,200 mg total) by mouth 2 (two) times daily. 120 tablet 11    HYDROcodone-acetaminophen (NORCO) 5-325 mg per tablet Take 1 tablet by mouth every 12 (twelve) hours as needed for Pain. 60 tablet 0    metFORMIN (GLUCOPHAGE-XR) 750 MG ER 24hr tablet TAKE 2 TABLETS BY MOUTH DAILY WITH BREAKFAST AND 1 TABLET IN THE EVENING 90 tablet 0    rosuvastatin (CRESTOR) 20 MG tablet Take 1 tablet (20 mg total) by mouth once daily. 90 tablet 3    testosterone cypionate (DEPOTESTOTERONE CYPIONATE) 200 mg/mL injection Inject 1 mL (200 mg total) into the muscle every 7 days. 5 mL 5    tirzepatide (MOUNJARO) 10 mg/0.5 mL PnIj Inject 10 mg into the skin every 7 days. 4 mL 0    [DISCONTINUED] semaglutide (OZEMPIC) 2 mg/dose (8 mg/3 mL) PnIj Inject 2 mg into the skin every 7 days. 3 mL 11

## 2025-04-28 ENCOUNTER — PATIENT MESSAGE (OUTPATIENT)
Dept: PAIN MEDICINE | Facility: CLINIC | Age: 50
End: 2025-04-28
Payer: COMMERCIAL

## 2025-05-02 ENCOUNTER — TELEPHONE (OUTPATIENT)
Dept: PAIN MEDICINE | Facility: CLINIC | Age: 50
End: 2025-05-02
Payer: COMMERCIAL

## 2025-05-02 NOTE — TELEPHONE ENCOUNTER
----- Message from LatjudithHDF sent at 5/2/2025  4:01 PM CDT -----  Contact: melanie/ [facility]  Type:  RX Refill RequestWho Called: ermaRefill or New Rx:refRX Name and Strength:cyclobenzaprine (FLEXERIL) 10 MG tablet How is the patient currently taking it? (ex. 1XDay):Is this a 30 day or 90 day RX:Preferred Pharmacy with phone number:[id number] - [place], [place] - [address] AT [facility] [id number] [place] [address]Ynsht: [phone] Fax: [phone] or Mail Order:localOrdering Provider:Would the patient rather a call back or a response via [address]? Call naheed Best Call Back Number:[phone] Information:

## 2025-05-05 DIAGNOSIS — M47.816 LUMBAR SPONDYLOSIS: ICD-10-CM

## 2025-05-05 DIAGNOSIS — M54.16 LUMBAR RADICULOPATHY: ICD-10-CM

## 2025-05-05 DIAGNOSIS — M54.9 DORSALGIA, UNSPECIFIED: ICD-10-CM

## 2025-05-05 NOTE — TELEPHONE ENCOUNTER
No care due was identified.  [facility] Embedded Care Due Messages. Reference number: [id number].   5/05/2025 6:10:42 PM CDT

## 2025-05-06 ENCOUNTER — TELEPHONE (OUTPATIENT)
Dept: PAIN MEDICINE | Facility: CLINIC | Age: 50
End: 2025-05-06
Payer: COMMERCIAL

## 2025-05-06 RX ORDER — CHLORZOXAZONE 500 MG/1
TABLET ORAL
Qty: 60 TABLET | Refills: 1 | Status: SHIPPED | OUTPATIENT
Start: 2025-05-06

## 2025-05-06 RX ORDER — METFORMIN HYDROCHLORIDE 750 MG/1
TABLET, EXTENDED RELEASE ORAL
Qty: 270 TABLET | Refills: 1 | Status: SHIPPED | OUTPATIENT
Start: 2025-05-06

## 2025-05-06 NOTE — TELEPHONE ENCOUNTER
Good Morning,     Pt is requesting for a refill of: chlorzoxazone (PARAFON FORTE) 500 mg Tab   Last filed: 03/07/25  Last encounter: 03/07/25  Up coming apt: 05/27/25  Pharmacy: [id number] - [place], [place] - [address] AT [facility] [address]

## 2025-05-06 NOTE — TELEPHONE ENCOUNTER
Refill Routing Note   Medication(s) are not appropriate for processing by [facility] for the following reason(s):        Drug-disease interaction    [facility] action(s):  Defer        Medication Therapy Plan: Drug-Disease: metFORMIN and Hepatic steatosis; DEFER      Appointments  past 12m or future 3m with PCP    Date Provider   Last Visit   4/2/2025 Genevieve Mclaughlin MD   Next Visit   10/2/2025 Genevieve Mclaughlin MD   ED visits in past 90 days: 0        Note composed:4:58 AM 05/06/2025

## 2025-05-06 NOTE — TELEPHONE ENCOUNTER
Reached out to pt in regards to medication (Flexeril) per Mrs. Carpio she wanted to know if he is still taking medication.    Pt states that he had a procedure done on the 24th of April and hasn't gotten any relief from it.     Wanting to know what else he can take in regards to his pain.    Steven YANES MA

## 2025-05-07 ENCOUNTER — PATIENT MESSAGE (OUTPATIENT)
Dept: PAIN MEDICINE | Facility: CLINIC | Age: 50
End: 2025-05-07
Payer: COMMERCIAL

## 2025-05-07 ENCOUNTER — TELEPHONE (OUTPATIENT)
Dept: PAIN MEDICINE | Facility: CLINIC | Age: 50
End: 2025-05-07
Payer: COMMERCIAL

## 2025-05-07 NOTE — TELEPHONE ENCOUNTER
Reached out to pt to get him scheduled for an in person exam. Pt states that he is having a lot of leg, hip, and foot pain. Left side.    Steven YANES MA

## 2025-05-08 ENCOUNTER — TELEPHONE (OUTPATIENT)
Dept: NEUROSURGERY | Facility: CLINIC | Age: 50
End: 2025-05-08
Payer: COMMERCIAL

## 2025-05-08 ENCOUNTER — HOSPITAL ENCOUNTER (OUTPATIENT)
Dept: RADIOLOGY | Facility: HOSPITAL | Age: 50
Discharge: HOME | End: 2025-05-08
Attending: PHYSICIAN ASSISTANT
Payer: COMMERCIAL

## 2025-05-08 ENCOUNTER — OFFICE VISIT (OUTPATIENT)
Dept: PAIN MEDICINE | Facility: CLINIC | Age: 50
End: 2025-05-08
Payer: COMMERCIAL

## 2025-05-08 VITALS — BODY MASS INDEX: 35.29 KG/M2 | WEIGHT: 253 LBS

## 2025-05-08 DIAGNOSIS — M54.16 LUMBAR RADICULOPATHY: ICD-10-CM

## 2025-05-08 DIAGNOSIS — M54.16 LUMBAR RADICULOPATHY: Primary | ICD-10-CM

## 2025-05-08 DIAGNOSIS — M48.07 SPINAL STENOSIS, LUMBOSACRAL REGION: ICD-10-CM

## 2025-05-08 DIAGNOSIS — M54.9 DORSALGIA, UNSPECIFIED: ICD-10-CM

## 2025-05-08 PROCEDURE — 3008F BODY MASS INDEX DOCD: CPT | Mod: CPTII,S$GLB,, | Performed by: PHYSICIAN ASSISTANT

## 2025-05-08 PROCEDURE — 99999 PR PBB SHADOW E&M-EST. PATIENT-LVL III: CPT | Mod: PBBFAC,,, | Performed by: PHYSICIAN ASSISTANT

## 2025-05-08 PROCEDURE — 73502 X-RAY EXAM HIP UNI 2-3 VIEWS: CPT | Mod: TC,LT

## 2025-05-08 PROCEDURE — 3044F HG A1C LEVEL LT 7.0%: CPT | Mod: CPTII,S$GLB,, | Performed by: PHYSICIAN ASSISTANT

## 2025-05-08 PROCEDURE — 73502 X-RAY EXAM HIP UNI 2-3 VIEWS: CPT | Mod: 26,LT,, | Performed by: STUDENT IN AN ORGANIZED HEALTH CARE EDUCATION/TRAINING PROGRAM

## 2025-05-08 PROCEDURE — 99215 OFFICE O/P EST HI 40 MIN: CPT | Mod: S$GLB,,, | Performed by: PHYSICIAN ASSISTANT

## 2025-05-08 NOTE — PROGRESS NOTES
"Est Patient Chronic Pain Note (Follow up Visit)    PCP: Genevieve Mclaughlin MD    Chief Complaint   Patient presents with    Leg Pain     Left leg pain radiating arcoss lower back       Notes:       SUBJECTIVE:    Interval History (5/8/2025):  Kendrick Nava presents today for follow-up visit.  Patient was last seen on 3/7/2025. He underwent bilateral Lumbar L5/S1 TF JONATAN on 4/24/25 with 100% relief for 2-3 days. Patient reports pain as 10/10 today. He started having severe pain two days after the injection. "My foot glows- it's so red."   He has pain throughout the LLE - down to the bottom of his foot. Due to the pressure and pain with the left hip and groin he has difficulty sitting on the toilet. Patient states it hurts for him to have a BM but once completed the pain returns.    Patient does not have much pain with the RLE as compared to the LLE. He still has tingling in the leg.  He was evaluated at a local Urgent care. Had Toradol and steroid injections which did not relieve the pain.     Patient admits that he's been taking Norco  5/325 mg every 4 hrs as opposed to every 12 hrs due to the pain.       Interval History (3/7/2025):  Kendrick Nava presents today for follow-up visit.  Patient was last seen on 12/31/24. He denies any significant changes. Reports improvement with Gabapentin. He does not get any relief with Flexeril.     He did complete EMG of upper extremities since his last visit.    Interval History (12/31/2024):   Kendrick Nava presents today for follow-up visit. He is here today for a 3 month follow up. Patient reports arms/ legs are "falling asleep." He has numbness/tingling from the proximal calf to the foot. Both hands are numb, however it is more pronounced with the R hand.    Patient continues to utilize Norco 5/325, Gabapentin, Celebrex and Flexeril. He requests refills on Norco and Gabapentin today.       Interval History (9/26/24) Kendrick Nava presents today for follow-up " visit.  Patient was last seen on 2024.  Patient was suppose to follow up sooner but had to reschedule after attending a  and being exposed to COVID.  Patient reports pain as 6/10 today. He is currently taking Gabapentin, Celebrex, Flexeril, and   Norco. Patient is tolerating medications well without any unwanted side effects. He does request refills for Norco only.  No new issues or concerns today.     Initial HPI (2024):  Kendrick Nava is a 49 y.o. male presents today for persistent lower back and leg pain.  He continues to have numbness and tingling in his left lower extremity with weakness with foot dorsiflexion..  He is s/p repeat bilateral L5-S1 TFESI that occurred on 2024.  He reports that this resulted in 75% relief relief that is waning in relief but still managing it well with current medication regimen of gabapentin, Celebrex, Flexeril, and  occasional use of Norco.  Current pain intensity is 4/10.  He is seeing outside neurosurgeon who discussed potential lumbar spine surgery.       Patient denies night fever/night sweats, urinary incontinence, bowel incontinence, significant weight loss, significant motor weakness, and loss of sensations.    Pain Disability Index Review:      2024    10:31 AM 2024    12:50 PM 2024     8:51 AM   Last 3 PDI Scores   Pain Disability Index (PDI) 42 [phone] 35       Interval HPI 2024:  Kendrick Nava is a 48 y.o. male presents today for persistent lower back and leg pain.  He continues to have numbness and tingling in his left lower extremity with weakness with foot dorsiflexion..  He is s/p bilateral L5-S1 TFESI that occurred on 2023.  He reports that this resulted in 70% relief that is waning in relief.  Current pain intensity is 4/10.  He is seeing outside neurosurgeon who discussed potential lumbar spine surgery, but ultimately left it up to the patient for which the patient was unsure of what he would like to do at this  time.    Interval HPI 11/13/2023:  Kendrick Nava is a 48 y.o. male presents today for follow-up chronic lower back pain.   He reports that he has had severe worsening lower back pain and left lower extremity pain with weakness.  He reports that he had a fall off of a ladder due to this weakness, but denies any injuries prior to the initiation of this pain flare.  Current pain intensity is 6/10, but states it can increase to 10/10 at its worst.     Interval HPI 10/18/2023:  eKndrick Nava is a 47 y.o. male presents today for follow-up chronic lower back pain.  Current pain intensity is 4/10.  Reports that his pain is stable with use of gabapentin, Celebrex, Flexeril and Norco p.r.n..  He denies any adverse reactions to these medications he reports his pain is well managed with these medications.  He is able to complete his daily task and labor intensive job without any significant issues.    Interval History (7/18/2023):  Kendrick Nava presents today for follow-up visit.  Patient was last seen on 5/2/2023. He reports his leg pain is well-controlled with Gabapentin, he takes 900 mg in the morning and 900 mg at night. Continues to take Celebrex daily and Flexeril nightly with good relief, Norco sparingly for severe. Pain. Reports right sided pain in his lower back that wraps around into his stomach x 3 weeks. Patient reports pain as 4/10 today.    Interval History (5/1/2023): Kendrick Nava presents today for follow-up visit.  he underwent bilateral L5/S1 TF JOANTAN on 4/4/23.  The patient reports that he is/was better following the procedure.  he reports initially experienced worsening pain for about a week after the procedure, followed by  95% pain relief x 1 week before relief dwindled to 20%. Reports continued pressure in his lower back and pain across the lumbosacral region in a band like distribution with radiation into his right lower extremity along L5/S1 distribution. Takes Gabapentin and Flexeril  nightly with good relief, no side effects. Norco for severe pain.  Patient reports pain as 6/10 today.    Interval HPI 03/13/2023  Kendrick Nava is a 47 y.o. male presents today for injection follow-up.  He was last seen for L5-S1 IL JONATAN on 02/02/2023.  He reports that this resulted in 80+ % relief for the initial 2 weeks, but now has decreased to about 50-60% relief.  He feels that the pain is of the same type and quality and is in the same location..    Interval History (1/13/2023):  Kendrick Nava presents today for follow-up visit.  Patient was last seen on Visit date not found. Patient reports pain as 5/10 today.  He has been performing physician directed exercises targeting sciatic nerve pain for several weeks without improvement. He reports continued pain in his thoracolumbar region but his worst pain is persistently along his lumbosacral region in a band-like distribution with radiation into his right lower extremity along L5/S1 distribution. Reports occasional radiation into his left leg. He installs italo for a living and reports prolonged bending and stooping worsens his symptoms. He reports by the end of the day he walks with a limp.      Initial HPI 08/29/2022  Kendrick Nava is a 47 y.o. male who presents to the clinic for the evaluation of back pain.  He was referred by his primary care provider for further evaluation and management of this pain.  The pain started a few years ago following a fall from a ladder in 2018 and symptoms have been unchanged.The pain is located in the right thoracolumbar area and radiates to the lumbosacral region with occasional radiation into the left lower extremity extending to the foot and ankle.  The pain is described as aching, numbness, tingling burning and is rated as 6/10. The pain is rated with a score of  4/10 on the BEST day and a score of 8/10 on the WORST day.  Symptoms interfere with daily activity. The pain is exacerbated by work activities,  prolonged standing or sitting.  The pain is mitigated by activity modification.       Non-Pharmacologic Treatments:  Physical Therapy/Home Exercise: yes  Ice/Heat:yes  TENS: no  Acupuncture: no  Massage: yes  Chiropractic: yes    Other: no      Pain Medications:  - Opioids:  Tramadol  - Adjuvant Medications:  Mobic  - Anti-Coagulants:  None     report:  Reviewed and consistent with medication use as prescribed.      Pain Procedures:   -12/07/2023:  Bilateral L5-S1 TFESI, 70% relief overall  -02/02/2023:  L5-S1 IL JONATAN, 80% relief for the initial 2 weeks with continued 50-60% relief  - bilateral L5/S1 TF JONATAN on 4/4/23 with 95% relief x 1 week, then 20% relief  -bilateral L5/S1 TFESI on 12/07/2023, 70% relief  - bilateral L5-S1 TFESI on 03/21/2024, 75% relief  - Bilateral L5/S1 TF JONATAN on 4/24/25 with 100% relief for 2-3 days only    Imaging:     EMG 1/14/2025   IMPRESSION  ABNORMAL Study  2. There is electrodiagnostic evidence of a moderate demyelinating median neuropathy (Carpal tunnel syndrome) across bilateral wrists.  There is an acute on chronic radiculopathy of the right S1 and left L5 nerve roots and a subacute on chronic radiculopathy of the left S1 nerve root. There was no evidence of a cervical radiculopathy of the C5-T1 nerve roots    MRI lumbar spine 11/17/2023:  There are 5 non-rib-bearing lumbar vertebrae.  Alignment is unremarkable with no significant listhesis.  No acute fracture or compression deformity.  No aggressive focal signal abnormality.     Conus medullaris terminates at the L1 level.  Conus medullaris is normal in size and signal.     T12-L1: Mild disc desiccation and trace bulge.  No significant spinal canal or neural foraminal stenosis.     L1-L2: Mild disc desiccation.  No significant spinal canal or neural foraminal stenosis.     L2-L3: Mild disc desiccation and trace bulge.  No significant spinal canal or neural foraminal stenosis.     L3-L4: Disc desiccation and small bulge.  Mild  bilateral facet arthropathy.  No significant spinal canal stenosis.  Mild bilateral neural foraminal stenosis.     L4-L5: Disc desiccation and asymmetric to the left disc bulge.  Mild bilateral facet arthropathy.  No significant spinal canal stenosis.  Moderate left and mild right neural foraminal stenosis.     L5-S1: Disc desiccation and asymmetric to the right disc bulge.  Mild bilateral facet arthropathy.  No significant spinal canal stenosis although there is encroachment on the right lateral recess and descending right S1 nerve root.  No significant neural foraminal stenosis.     Paraspinal soft tissues are unremarkable.  Visualized intra-abdominal and pelvic contents are also unremarkable.    CT lumbar spine 12/02/2022     FINDINGS:  No acute fracture or dislocation.  Moderate disc height loss T11-12 and T12-L1.  Mild disc height loss at L1-2 through L4-5.  Mild facet arthropathy.  No significant endplate sclerosis.  Mild Schmorl's node formation T12-L1 and L1-2.     SI joints unremarkable.  Mild aortoiliac atherosclerotic calcification.     Impression:     No acute abnormality identified.  Chronic mild discogenic degenerative change as described above    CT lumbar spine 07/22/2018  1.  Acute, traumatic fractures involving the left L2, left L3, and left L4 transverse processes.     2.  Multilevel lumbar degenerative disc disease, spondylosis, and stenoses, as discussed above. This is probably most prominent at L5-S1 on the right, with there is mass effect on and posterior displacement of the right S1 nerve root within the lateral recess, with right lateral recess stenosis.    X-ray lumbar spine 12/02/2021:  The vertebral bodies of the lumbar spine demonstrate a normal height and alignment.  There appears to be mild chronic vertebral body height loss at the T11 and T12 levels with some spondylosis noted at these levels.  The disc space heights appear to be relatively well maintained.  Mild spondylosis noted  anteriorly at the L3-4 and to a lesser degree the L4-5 levels.  No pars defects are noted.       Past Medical History:   Diagnosis Date    Diabetes mellitus, type 2     Hyperlipidemia     Testosterone deficiency     LOW Testosterone     Past Surgical History:   Procedure Laterality Date    EPIDURAL STEROID INJECTION N/A 2/2/2023    Procedure: Lumbar L5/S1 IL JONATAN with right paramedian approach RN IV Sedation;  Surgeon: Nico Angeles MD;  Location: [facility];  Service: Pain Management;  Laterality: N/A;    SELECTIVE INJECTION OF ANESTHETIC AGENT AROUND LUMBAR SPINAL NERVE ROOT BY TRANSFORAMINAL APPROACH Bilateral 4/4/2023    Procedure: Bilateral L5/S1 TF JONATAN;  Surgeon: Nico Angeles MD;  Location: [facility];  Service: Pain Management;  Laterality: Bilateral;    SELECTIVE INJECTION OF ANESTHETIC AGENT AROUND LUMBAR SPINAL NERVE ROOT BY TRANSFORAMINAL APPROACH Bilateral 12/7/2023    Procedure: Bilateral L5/S1 TF JONATAN;  Surgeon: Nico Angeles MD;  Location: [facility];  Service: Pain Management;  Laterality: Bilateral;    SELECTIVE INJECTION OF ANESTHETIC AGENT AROUND LUMBAR SPINAL NERVE ROOT BY TRANSFORAMINAL APPROACH Bilateral 3/21/2024    Procedure: Bilateral L5/S1 TF JONATAN;  Surgeon: Nico Angeles MD;  Location: [facility];  Service: Pain Management;  Laterality: Bilateral;    SELECTIVE INJECTION OF ANESTHETIC AGENT AROUND LUMBAR SPINAL NERVE ROOT BY TRANSFORAMINAL APPROACH Bilateral 4/24/2025    Procedure: Bilateral Lumbar L5/S1 TF JONATAN;  Surgeon: Nico Angeles MD;  Location: [facility];  Service: Pain Management;  Laterality: Bilateral;     Social History     Socioeconomic History    Marital status:    Tobacco Use    Smoking status: Some Days     Types: Vaping with nicotine    Smokeless tobacco: Never   Substance and Sexual Activity    Alcohol use: Yes     Alcohol/week: 1.0 standard drink of alcohol     Types: 1 Shots of liquor per week     Comment: once a month    Drug  use: Never    Sexual activity: Yes     Partners: Male     Family History   Problem Relation Name Age of Onset    Diabetes type II Mother      Diabetes type II Father      Hypertension Father      Diabetes type II Brother      Cancer Maternal Grandmother      Diabetes Maternal Grandfather      No Known Problems Paternal Grandmother      Heart attack Paternal Grandfather         Review of patient's allergies indicates:  No Known Allergies      Current Outpatient Medications   Medication Sig    celecoxib (CELEBREX) 200 MG capsule TAKE 1 CAPSULE(200 MG) BY MOUTH TWICE DAILY    chlorzoxazone (PARAFON FORTE) 500 mg Tab TAKE 1 TABLET(500 MG) BY MOUTH TWICE DAILY AS NEEDED FOR MUSCLE SPASMS. MAY CAUSE DROWSINESS    gabapentin (NEURONTIN) 600 MG tablet Take 2 tablets (1,200 mg total) by mouth 2 (two) times daily.    HYDROcodone-acetaminophen (NORCO) 5-325 mg per tablet Take 1 tablet by mouth every 12 (twelve) hours as needed for Pain.    metFORMIN (GLUCOPHAGE-XR) 750 MG ER 24hr tablet TAKE 2 TABLETS BY MOUTH EVERY DAY WITH BREAKFAST AND 1 TABLET BY MOUTH EVERY EVENING    rosuvastatin (CRESTOR) 20 MG tablet Take 1 tablet (20 mg total) by mouth once daily.    testosterone cypionate (DEPOTESTOTERONE CYPIONATE) 200 mg/mL injection Inject 1 mL (200 mg total) into the muscle every 7 days.    tirzepatide (MOUNJARO) 10 mg/0.5 mL PnIj Inject 10 mg into the skin every 7 days.     No current facility-administered medications for this visit.       Review of Systems     GENERAL:  No weight loss, malaise or fevers.  HEENT:   No recent changes in vision or hearing  NECK:  Negative for lumps, no difficulty with swallowing.  RESPIRATORY:  Negative for cough, wheezing or shortness of breath, patient denies any recent URI.  CARDIOVASCULAR:  Negative for chest pain, leg swelling or palpitations.  GI:  Negative for abdominal discomfort, blood in stools or black stools or change in bowel habits.  MUSCULOSKELETAL:  See HPI.  SKIN:  Negative for  lesions, rash, and itching.  PSYCH:  No mood disorder or recent psychosocial stressors.  Patients sleep is not disturbed secondary to pain.  HEMATOLOGY/LYMPHOLOGY:  Negative for prolonged bleeding, bruising easily or swollen nodes.  Patient is not currently taking any anti-coagulants  NEURO:   No history of headaches, syncope, paralysis, seizures or tremors.  All other reviewed and negative other than HPI.    OBJECTIVE:    Vitals:    05/08/25 1132   Weight: 114.8 kg (253 lb)     Body mass index is 35.29 kg/m².       Physical Exam    GENERAL: Well appearing, in no acute distress, alert and oriented x3.  PSYCH:  Mood and affect appropriate.  SKIN: Skin color, texture, turgor normal, no rashes or lesions.  HEAD/FACE:  Normocephalic, atraumatic. Cranial nerves grossly intact.  PULM: No evidence of respiratory difficulty, symmetric chest rise.  GI:  Soft and non-tender, negative heredia's sign, negative Mcburney's sign, negative CVA tenderness  BACK: Straight leg raising in the sitting and supine positions is positive to radicular pain on the left.   Moderate pain to palpation over the facet joints of the lumbar spine and lumbar paraspinals, mostly on the left.   Limited range of motion secondary to pain reproduction. Flexion relieves the pain.  EXTREMITIES: No deformities, edema, or skin discoloration. Good capillary refill.  MUSCULOSKELETAL:  Hip and knee provocative maneuvers are negative.  There is pain with palpation over the sacroiliac joint on the left side. TTP over left piriformis. Moderated tenderness along right quadratus lumborum. FABERs test is negative.  FADIRs test is negative.   Bilateral upper and lower extremity strength is normal and symmetric with the exception of left-sided footdrop at 3/5, 4/5 inversion/eversion of left foot.  No atrophy or tone abnormalities are noted.    GAIT:  slow, antalgic, gait.       LABS:  Lab Results   Component Value Date    WBC 6.69 03/26/2025    HGB 16.0 03/26/2025    HCT  48.7 03/26/2025    MCV 86 03/26/2025     03/26/2025       CMP  Sodium   Date Value Ref Range Status   03/26/2025 138 136 - 145 mmol/L Final   09/12/2024 139 136 - 145 mmol/L Final     Potassium   Date Value Ref Range Status   03/26/2025 4.3 3.5 - 5.1 mmol/L Final   09/12/2024 4.7 3.5 - 5.1 mmol/L Final     Chloride   Date Value Ref Range Status   03/26/2025 103 95 - 110 mmol/L Final   09/12/2024 103 95 - 110 mmol/L Final     CO2   Date Value Ref Range Status   03/26/2025 26 23 - 29 mmol/L Final   09/12/2024 29 23 - 29 mmol/L Final     Glucose   Date Value Ref Range Status   03/26/2025 151 (H) 70 - 110 mg/dL Final   09/12/2024 125 (H) 70 - 110 mg/dL Final     BUN   Date Value Ref Range Status   03/26/2025 10 6 - 20 mg/dL Final     Creatinine   Date Value Ref Range Status   03/26/2025 1.0 0.5 - 1.4 mg/dL Final     Calcium   Date Value Ref Range Status   03/26/2025 8.7 8.7 - 10.5 mg/dL Final   09/12/2024 9.4 8.7 - 10.5 mg/dL Final     Protein Total   Date Value Ref Range Status   03/26/2025 6.9 6.0 - 8.4 gm/dL Final     Total Protein   Date Value Ref Range Status   09/12/2024 6.8 6.0 - 8.4 g/dL Final     Albumin   Date Value Ref Range Status   03/26/2025 4.1 3.5 - 5.2 g/dL Final   09/12/2024 4.3 3.5 - 5.2 g/dL Final   11/04/2022 4.8 3.6 - 5.1 g/dL Final     Total Bilirubin   Date Value Ref Range Status   09/12/2024 0.4 0.1 - 1.0 mg/dL Final     Comment:     For infants and newborns, interpretation of results should be based  on gestational age, weight and in agreement with clinical  observations.    Premature Infant recommended reference ranges:  Up to 24 hours.............<8.0 mg/dL  Up to 48 hours............<12.0 mg/dL  3-5 days..................<15.0 mg/dL  6-29 days.................<15.0 mg/dL       Bilirubin Total   Date Value Ref Range Status   03/26/2025 0.3 0.1 - 1.0 mg/dL Final     Comment:     For infants and newborns, interpretation of results should be based   on gestational age, weight and in  agreement with clinical   observations.    Premature Infant recommended reference ranges:   0-24 hours:  <8.0 mg/dL   24-48 hours: <12.0 mg/dL   3-5 days:    <15.0 mg/dL   6-29 days:   <15.0 mg/dL     Alkaline Phosphatase   Date Value Ref Range Status   09/12/2024 55 55 - 135 U/L Final     ALP   Date Value Ref Range Status   03/26/2025 47 40 - 150 unit/L Final     AST   Date Value Ref Range Status   03/26/2025 45 11 - 45 unit/L Final   09/12/2024 29 10 - 40 U/L Final     ALT   Date Value Ref Range Status   03/26/2025 47 (H) 10 - 44 unit/L Final   09/12/2024 43 10 - 44 U/L Final     Anion Gap   Date Value Ref Range Status   03/26/2025 9 8 - 16 mmol/L Final     eGFR if    Date Value Ref Range Status   06/06/2022 >60.0 >60 mL/min/1.73 m^2 Final     eGFR if non    Date Value Ref Range Status   06/06/2022 >60.0 >60 mL/min/1.73 m^2 Final     Comment:     Calculation used to obtain the estimated glomerular filtration  rate (eGFR) is the CKD-EPI equation.          Lab Results   Component Value Date    HGBA1C 6.8 (H) 03/26/2025     ASSESSMENT: 49 y.o. year old male with chronic lower back pain, consistent with     1. Lumbar radiculopathy  Ambulatory referral/consult to Pain Clinic    Ambulatory referral/consult to Neurosurgery    CANCELED: X-Ray Hip 4 or more views Left (with Pelvis when performed)      2. Dorsalgia, unspecified  MRI Lumbar Spine Without Contrast    Ambulatory referral/consult to Pain Clinic    Ambulatory referral/consult to Neurosurgery    CANCELED: X-Ray Hip 4 or more views Left (with Pelvis when performed)      3. Spinal stenosis, lumbosacral region  MRI Lumbar Spine Without Contrast    Ambulatory referral/consult to Pain Clinic    Ambulatory referral/consult to Neurosurgery    CANCELED: X-Ray Hip 4 or more views Left (with Pelvis when performed)          PLAN:   - Interventions:  None at this time.     S/p Bilateral Lumbar L5/S1 TFESI on 4/24/25  with 100% for 2-3 days  "  S/p repeat bilateral L5-S1 TFESI on 03/21/2024, 75% relief  s/p bilateral L5/S1 TF JONATAN on 12/07/2023 with 70% relief overall  -s/p bilateral L5/S1 TF JONATAN on 4/4/23 with 95% relief x 1 week, then 20% relief  s/p L5/S1 IL JONATAN with 80% relief with the initial 2 weeks.      - Anticoagulation use: no       - Medications: I have stressed the importance of physical activity and a home exercise plan to help with pain and improve health. and Patient can continue with medications for now since they are providing benefits, using them appropriately, and without side effects.     An opioid pain contract was completed on 06/12/2024 after having an extensive conversation about chronic opioid use for pain management. We discussed the risks and benefits of opioids.  I discouraged the patient from escalation of opioid use and try to minimize its use to decrease chance of dependence, tolerance, and opioid-based hyperalgesia.  I reviewed the  in great detail and there are no inconsistencies and it is appropriate with patient's history.  There are no signs of aberrant drug behavior.  The opioid risk tool was also completed.  Pt counseled about the side effects of long term use of opioids including dependence, tolerance, addiction, respiratory depression, somnelence , immune and endocrine dysfunction.  The patient expressed understanding.        --Will increase to Norco  7.5/325 mg Q 12 p.r.n., 60 tablets. Routed to Dr. Angeles for approval.  -Continue Gabapentin 600 mg BID. Previously tried Lyrica- unable to tolerate "interfered with patient mentally."    -Continue Celebrex 200 mg BID for day time pain/inflammation, per PCP. Patient states this helps tremendously with his hand discomfort.    - Continue Parafon Forte: Take 1 tablet (500 mg total) by mouth 2 (two) times daily as needed (muscle spasms). May cause drowsiness.,    (Previously Rx'd Flexeril- not effective)       report:  Reviewed and consistent with medication use as " prescribed.      - Therapy:  Advised patient continue with activities as tolerated    - Imaging: Reviewed available imaging with patient and answered any questions they had regarding study.  Order updated MRI lumbar spine, x-rays of hip/pelvis.    - Consults/Referrals:   -  Refer to Dr. Still for consideration of medical marijuana for chronic pain treatment - if patient chooses to explore this treatment option.   - Referral to Neurosurgery for further eval/tx recommendations (after MRI is completed)    - Records:  Reviewed/Obtain old records from outside physicians and imaging    - Follow up visit:  can follow up after imaging studies are completed or after seeing NSGY.    - Counseled patient regarding the importance of activity modification and physical therapy    - This condition does not require this patient to take time off of work, and the primary goal of our Pain Management services is to improve the patient's functional capacity.    - Patient Questions: Answered all of the patient's questions regarding diagnosis, therapy, and treatment        The above plan and management options were discussed at length with patient. Patient is in agreement with the above and verbalized understanding.    Visit today included increased complexity associated with the care of the episodic problem of chronic pain which was addressed and continue to manage the longitudinal care of the patient due to the serious and/or complex managed problem(s) listed above.    Urine Drug Screen- 9/26/2024: Compliant with Medication    Basil Carpio PA-C  Interventional Pain Management  [facility]

## 2025-05-08 NOTE — TELEPHONE ENCOUNTER
Contacted patient regarding referral with Neurosurgery. Patient states no surgeries within last 2 years, nor was pain/injury caused by any accident that would be involved in any type of future litigation.    Daphne Banegas RN

## 2025-05-11 DIAGNOSIS — M54.16 LUMBAR RADICULOPATHY: ICD-10-CM

## 2025-05-11 DIAGNOSIS — M54.9 DORSALGIA, UNSPECIFIED: Primary | ICD-10-CM

## 2025-05-11 DIAGNOSIS — M47.816 LUMBAR SPONDYLOSIS: ICD-10-CM

## 2025-05-11 DIAGNOSIS — M48.07 SPINAL STENOSIS, LUMBOSACRAL REGION: ICD-10-CM

## 2025-05-12 RX ORDER — HYDROCODONE BITARTRATE AND ACETAMINOPHEN 7.5; 325 MG/1; MG/1
1 TABLET ORAL EVERY 12 HOURS PRN
Qty: 60 TABLET | Refills: 0 | Status: SHIPPED | OUTPATIENT
Start: 2025-05-12 | End: 2025-06-11

## 2025-05-14 NOTE — TELEPHONE ENCOUNTER
No care due was identified.  [facility] Embedded Care Due Messages. Reference number: [id number].   2/16/2025 7:22:59 PM CST   Quality 226: Preventive Care And Screening: Tobacco Use: Screening And Cessation Intervention: Patient screened for tobacco use and is an ex/non-smoker Quality 111:Pneumonia Vaccination Status For Older Adults: Pneumococcal Vaccination not Administered or Previously Received, Reason not Otherwise Specified Quality 131: Pain Assessment And Follow-Up: Pain assessment using a standardized tool is documented as negative, no follow-up plan required Detail Level: Detailed Quality 130: Documentation Of Current Medications In The Medical Record: Current Medications Documented Quality 431: Preventive Care And Screening: Unhealthy Alcohol Use - Screening: Patient screened for unhealthy alcohol use using a single question and scores less than 2 times per year Quality 110: Preventive Care And Screening: Influenza Immunization: Influenza Immunization previously received during influenza season

## 2025-05-18 ENCOUNTER — HOSPITAL ENCOUNTER (OUTPATIENT)
Dept: RADIOLOGY | Facility: HOSPITAL | Age: 50
Discharge: HOME | End: 2025-05-18
Attending: PHYSICIAN ASSISTANT
Payer: COMMERCIAL

## 2025-05-18 DIAGNOSIS — M54.9 DORSALGIA, UNSPECIFIED: ICD-10-CM

## 2025-05-18 DIAGNOSIS — M48.07 SPINAL STENOSIS, LUMBOSACRAL REGION: ICD-10-CM

## 2025-05-18 PROCEDURE — 72148 MRI LUMBAR SPINE W/O DYE: CPT | Mod: TC

## 2025-05-18 PROCEDURE — 72148 MRI LUMBAR SPINE W/O DYE: CPT | Mod: 26,,, | Performed by: RADIOLOGY

## 2025-05-27 ENCOUNTER — PATIENT OUTREACH (OUTPATIENT)
Dept: ADMINISTRATIVE | Facility: HOSPITAL | Age: 50
End: 2025-05-27
Payer: COMMERCIAL

## 2025-05-27 ENCOUNTER — OFFICE VISIT (OUTPATIENT)
Dept: PAIN MEDICINE | Facility: CLINIC | Age: 50
End: 2025-05-27
Payer: COMMERCIAL

## 2025-05-27 VITALS
HEART RATE: 86 BPM | BODY MASS INDEX: 34.64 KG/M2 | HEIGHT: 71 IN | SYSTOLIC BLOOD PRESSURE: 115 MMHG | WEIGHT: 247.44 LBS | DIASTOLIC BLOOD PRESSURE: 77 MMHG

## 2025-05-27 DIAGNOSIS — M51.362 DEGENERATION OF INTERVERTEBRAL DISC OF LUMBAR REGION WITH DISCOGENIC BACK PAIN AND LOWER EXTREMITY PAIN: ICD-10-CM

## 2025-05-27 DIAGNOSIS — M48.061 NEURAL FORAMINAL STENOSIS OF LUMBAR SPINE: Primary | ICD-10-CM

## 2025-05-27 DIAGNOSIS — M54.16 LUMBAR RADICULOPATHY: ICD-10-CM

## 2025-05-27 PROCEDURE — G2211 COMPLEX E/M VISIT ADD ON: HCPCS | Mod: S$GLB,,, | Performed by: PHYSICIAN ASSISTANT

## 2025-05-27 PROCEDURE — 3074F SYST BP LT 130 MM HG: CPT | Mod: CPTII,S$GLB,, | Performed by: PHYSICIAN ASSISTANT

## 2025-05-27 PROCEDURE — 3078F DIAST BP <80 MM HG: CPT | Mod: CPTII,S$GLB,, | Performed by: PHYSICIAN ASSISTANT

## 2025-05-27 PROCEDURE — 3044F HG A1C LEVEL LT 7.0%: CPT | Mod: CPTII,S$GLB,, | Performed by: PHYSICIAN ASSISTANT

## 2025-05-27 PROCEDURE — 3008F BODY MASS INDEX DOCD: CPT | Mod: CPTII,S$GLB,, | Performed by: PHYSICIAN ASSISTANT

## 2025-05-27 PROCEDURE — 99999 PR PBB SHADOW E&M-EST. PATIENT-LVL III: CPT | Mod: PBBFAC,,, | Performed by: PHYSICIAN ASSISTANT

## 2025-05-27 PROCEDURE — 99214 OFFICE O/P EST MOD 30 MIN: CPT | Mod: S$GLB,,, | Performed by: PHYSICIAN ASSISTANT

## 2025-05-27 RX ORDER — PREDNISONE 5 MG/1
TABLET ORAL
Qty: 21 TABLET | Refills: 0 | Status: SHIPPED | OUTPATIENT
Start: 2025-05-27 | End: 2025-06-02

## 2025-05-27 NOTE — PROGRESS NOTES
"Est Patient Chronic Pain Note (Follow up Visit)    PCP: Genevieve Mclaughlin MD    Chief Complaint   Patient presents with    Follow-up            Notes:       SUBJECTIVE:      Interval History (5/27/2025):   Kendrick Nava presents today for follow-up visit.  Patient was last seen on 5/8/2025. At that visit, the plan was to complete imaging studies. Patient reports pain as ranging from 5/7 today.    Patient c/o numbness, tingling and a 'static' paiin in the LLE (entire leg). He also has numbness /tingling in posterior calf and foot on RLE.    Interval History (5/8/2025):  Kendrick Nava presents today for follow-up visit.  Patient was last seen on 3/7/2025. He underwent bilateral Lumbar L5/S1 TF JONATAN on 4/24/25 with 100% relief for 2-3 days. Patient reports pain as 10/10 today. He started having severe pain two days after the injection. "My foot glows- it's so red."   He has pain throughout the LLE - down to the bottom of his foot. Due to the pressure and pain with the left hip and groin he has difficulty sitting on the toilet. Patient states it hurts for him to have a BM but once completed the pain returns.    Patient does not have much pain with the RLE as compared to the LLE. He still has tingling in the leg.  He was evaluated at a local Urgent care. Had Toradol and steroid injections which did not relieve the pain.     Patient admits that he's been taking Norco  5/325 mg every 4 hrs as opposed to every 12 hrs due to the pain.       Interval History (3/7/2025):  Kendrick Nava presents today for follow-up visit.  Patient was last seen on 12/31/24. He denies any significant changes. Reports improvement with Gabapentin. He does not get any relief with Flexeril.     He did complete EMG of upper extremities since his last visit.    Interval History (12/31/2024):   Kendrick Nava presents today for follow-up visit. He is here today for a 3 month follow up. Patient reports arms/ legs are "falling asleep." He " has numbness/tingling from the proximal calf to the foot. Both hands are numb, however it is more pronounced with the R hand.    Patient continues to utilize Norco 5/325, Gabapentin, Celebrex and Flexeril. He requests refills on Norco and Gabapentin today.       Interval History (24) Kendrick Nava presents today for follow-up visit.  Patient was last seen on 2024.  Patient was suppose to follow up sooner but had to reschedule after attending a  and being exposed to COVID.  Patient reports pain as 6/10 today. He is currently taking Gabapentin, Celebrex, Flexeril, and   Norco. Patient is tolerating medications well without any unwanted side effects. He does request refills for Norco only.  No new issues or concerns today.     Initial HPI (2024):  Kendrick Nava is a 49 y.o. male presents today for persistent lower back and leg pain.  He continues to have numbness and tingling in his left lower extremity with weakness with foot dorsiflexion..  He is s/p repeat bilateral L5-S1 TFESI that occurred on 2024.  He reports that this resulted in 75% relief relief that is waning in relief but still managing it well with current medication regimen of gabapentin, Celebrex, Flexeril, and  occasional use of Norco.  Current pain intensity is 4/10.  He is seeing outside neurosurgeon who discussed potential lumbar spine surgery.       Patient denies night fever/night sweats, urinary incontinence, bowel incontinence, significant weight loss, significant motor weakness, and loss of sensations.    Pain Disability Index Review:      2024    10:31 AM 2024    12:50 PM 2024     8:51 AM   Last 3 PDI Scores   Pain Disability Index (PDI) 42 42 35       Interval HPI 2024:  Kendrick Nava is a 48 y.o. male presents today for persistent lower back and leg pain.  He continues to have numbness and tingling in his left lower extremity with weakness with foot dorsiflexion..  He is s/p bilateral  L5-S1 TFESI that occurred on 12/07/2023.  He reports that this resulted in 70% relief that is waning in relief.  Current pain intensity is 4/10.  He is seeing outside neurosurgeon who discussed potential lumbar spine surgery, but ultimately left it up to the patient for which the patient was unsure of what he would like to do at this time.    Interval HPI 11/13/2023:  Kendrick Nava is a 48 y.o. male presents today for follow-up chronic lower back pain.   He reports that he has had severe worsening lower back pain and left lower extremity pain with weakness.  He reports that he had a fall off of a ladder due to this weakness, but denies any injuries prior to the initiation of this pain flare.  Current pain intensity is 6/10, but states it can increase to 10/10 at its worst.     Interval HPI 10/18/2023:  Kendrick Nava is a 47 y.o. male presents today for follow-up chronic lower back pain.  Current pain intensity is 4/10.  Reports that his pain is stable with use of gabapentin, Celebrex, Flexeril and Norco p.r.n..  He denies any adverse reactions to these medications he reports his pain is well managed with these medications.  He is able to complete his daily task and labor intensive job without any significant issues.    Interval History (7/18/2023):  Kendrick Nava presents today for follow-up visit.  Patient was last seen on 5/2/2023. He reports his leg pain is well-controlled with Gabapentin, he takes 900 mg in the morning and 900 mg at night. Continues to take Celebrex daily and Flexeril nightly with good relief, Norco sparingly for severe. Pain. Reports right sided pain in his lower back that wraps around into his stomach x 3 weeks. Patient reports pain as 4/10 today.    Interval History (5/1/2023): Kendrick Nava presents today for follow-up visit.  he underwent bilateral L5/S1 TF JONATAN on 4/4/23.  The patient reports that he is/was better following the procedure.  he reports initially experienced  worsening pain for about a week after the procedure, followed by  95% pain relief x 1 week before relief dwindled to 20%. Reports continued pressure in his lower back and pain across the lumbosacral region in a band like distribution with radiation into his right lower extremity along L5/S1 distribution. Takes Gabapentin and Flexeril nightly with good relief, no side effects. Norco for severe pain.  Patient reports pain as 6/10 today.    Interval HPI 03/13/2023  Kendrick Nava is a 47 y.o. male presents today for injection follow-up.  He was last seen for L5-S1 IL JONATAN on 02/02/2023.  He reports that this resulted in 80+ % relief for the initial 2 weeks, but now has decreased to about 50-60% relief.  He feels that the pain is of the same type and quality and is in the same location..    Interval History (1/13/2023):  Kendrick Nava presents today for follow-up visit.  Patient was last seen on Visit date not found. Patient reports pain as 5/10 today.  He has been performing physician directed exercises targeting sciatic nerve pain for several weeks without improvement. He reports continued pain in his thoracolumbar region but his worst pain is persistently along his lumbosacral region in a band-like distribution with radiation into his right lower extremity along L5/S1 distribution. Reports occasional radiation into his left leg. He installs italo for a living and reports prolonged bending and stooping worsens his symptoms. He reports by the end of the day he walks with a limp.      Initial HPI 08/29/2022  Kendrick Nava is a 47 y.o. male who presents to the clinic for the evaluation of back pain.  He was referred by his primary care provider for further evaluation and management of this pain.  The pain started a few years ago following a fall from a ladder in 2018 and symptoms have been unchanged.The pain is located in the right thoracolumbar area and radiates to the lumbosacral region with occasional  radiation into the left lower extremity extending to the foot and ankle.  The pain is described as aching, numbness, tingling burning and is rated as 6/10. The pain is rated with a score of  4/10 on the BEST day and a score of 8/10 on the WORST day.  Symptoms interfere with daily activity. The pain is exacerbated by work activities, prolonged standing or sitting.  The pain is mitigated by activity modification.       Non-Pharmacologic Treatments:  Physical Therapy/Home Exercise: yes  Ice/Heat:yes  TENS: no  Acupuncture: no  Massage: yes  Chiropractic: yes    Other: no      Pain Medications:  - Opioids:  Tramadol  - Adjuvant Medications:  Mobic  - Anti-Coagulants:  None     report:  Reviewed and consistent with medication use as prescribed.      Pain Procedures:   -12/07/2023:  Bilateral L5-S1 TFESI, 70% relief overall  -02/02/2023:  L5-S1 IL JONATAN, 80% relief for the initial 2 weeks with continued 50-60% relief  - bilateral L5/S1 TF JONATAN on 4/4/23 with 95% relief x 1 week, then 20% relief  -bilateral L5/S1 TFESI on 12/07/2023, 70% relief  - bilateral L5-S1 TFESI on 03/21/2024, 75% relief  - Bilateral L5/S1 TF JONATAN on 4/24/25 with 100% relief for 2-3 days only    Imaging:     Results for orders placed during the hospital encounter of 05/18/25    MRI Lumbar Spine Without Contrast    Narrative  EXAMINATION:  MRI LUMBAR SPINE WITHOUT CONTRAST    CLINICAL HISTORY:  Spinal stenosis, lumbar;Low back pain, symptoms persist with > 6wks conservative treatment; Dorsalgia, unspecified    TECHNIQUE:  Multiplanar, multisequence MR images were acquired from the thoracolumbar junction to the sacrum without the administration of contrast.    COMPARISON:  11/17/2023 MRI lumbar spine    FINDINGS:  Vertebral body heights maintained with minimal multilevel Schmorl node endplate changes.  Grade 1 retrolisthesis of L5 on S1.  Similar multilevel degenerative disc desiccation.  Multilevel degenerative disc height loss similar.  No concerning  marrow signal abnormality.    Conus terminates normally.  Imaged intra-abdominal/pelvic structures are unremarkable.    L1-L2: No significant posterior disc bulge, spinal canal stenosis or neural foraminal narrowing.    L2-L3: No significant posterior disc bulge, spinal canal stenosis or neural foraminal narrowing.  Facet arthropathy.    L3-L4: No significant posterior disc bulge.  Facet arthropathy noted.  No high-grade spinal canal stenosis with minimal bilateral inferior neural foraminal narrowing.    L4-L5: Similar circumferential disc bulge asymmetric to the left foraminal location.  Facet degenerative findings in congenitally short pedicles contribute similar mild spinal canal stenosis.  Mild right neural foraminal narrowing.  Severe left neural foraminal narrowing with impingement of the exiting nerve root suspected.    L5-S1: Retrolisthesis with mild circumferential disc bulge.  Small superimposed right paracentral disc protrusion component causing mild mass effect/posterior displacement of the descending right S1 nerve root.  No high-grade spinal canal stenosis.  Facet arthropathy noted.  Mild bilateral neural foraminal narrowing noted.    Impression  Multilevel degenerative findings most prevalent at L4-5 and L5-S1 including severe left L4-5 neural foraminal narrowing and impingement of the exiting nerve root.      Electronically signed by: John Brush MD  Date:    05/18/2025  Time:    11:25     X-rays hip/pelvis 5/8/2025:  EXAMINATION:  XR HIP WITH PELVIS WHEN PERFORMED 2 OR 3 VIEWS LEFT     CLINICAL HISTORY:  Left Hip X-ray;  Dorsalgia, unspecified     TECHNIQUE:  XR HIP WITH PELVIS WHEN PERFORMED 2 OR 3 VIEWS LEFT     COMPARISON:  None.     FINDINGS:  No evidence of acute fracture or dislocation.  No radiopaque foreign body seen.  Hip joint spaces are symmetric.     Impression:     As above.        Electronically signed by:Valdemar Corley  Date:                                             05/08/2025  Time:                                           13:28    EMG 1/14/2025   IMPRESSION  ABNORMAL Study  2. There is electrodiagnostic evidence of a moderate demyelinating median neuropathy (Carpal tunnel syndrome) across bilateral wrists.  There is an acute on chronic radiculopathy of the right S1 and left L5 nerve roots and a subacute on chronic radiculopathy of the left S1 nerve root. There was no evidence of a cervical radiculopathy of the C5-T1 nerve roots    MRI lumbar spine 11/17/2023:  There are 5 non-rib-bearing lumbar vertebrae.  Alignment is unremarkable with no significant listhesis.  No acute fracture or compression deformity.  No aggressive focal signal abnormality.     Conus medullaris terminates at the L1 level.  Conus medullaris is normal in size and signal.     T12-L1: Mild disc desiccation and trace bulge.  No significant spinal canal or neural foraminal stenosis.     L1-L2: Mild disc desiccation.  No significant spinal canal or neural foraminal stenosis.     L2-L3: Mild disc desiccation and trace bulge.  No significant spinal canal or neural foraminal stenosis.     L3-L4: Disc desiccation and small bulge.  Mild bilateral facet arthropathy.  No significant spinal canal stenosis.  Mild bilateral neural foraminal stenosis.     L4-L5: Disc desiccation and asymmetric to the left disc bulge.  Mild bilateral facet arthropathy.  No significant spinal canal stenosis.  Moderate left and mild right neural foraminal stenosis.     L5-S1: Disc desiccation and asymmetric to the right disc bulge.  Mild bilateral facet arthropathy.  No significant spinal canal stenosis although there is encroachment on the right lateral recess and descending right S1 nerve root.  No significant neural foraminal stenosis.     Paraspinal soft tissues are unremarkable.  Visualized intra-abdominal and pelvic contents are also unremarkable.    CT lumbar spine 12/02/2022     FINDINGS:  No acute fracture or dislocation.   Moderate disc height loss T11-12 and T12-L1.  Mild disc height loss at L1-2 through L4-5.  Mild facet arthropathy.  No significant endplate sclerosis.  Mild Schmorl's node formation T12-L1 and L1-2.     SI joints unremarkable.  Mild aortoiliac atherosclerotic calcification.     Impression:     No acute abnormality identified.  Chronic mild discogenic degenerative change as described above    CT lumbar spine 07/22/2018  1.  Acute, traumatic fractures involving the left L2, left L3, and left L4 transverse processes.     2.  Multilevel lumbar degenerative disc disease, spondylosis, and stenoses, as discussed above. This is probably most prominent at L5-S1 on the right, with there is mass effect on and posterior displacement of the right S1 nerve root within the lateral recess, with right lateral recess stenosis.    X-ray lumbar spine 12/02/2021:  The vertebral bodies of the lumbar spine demonstrate a normal height and alignment.  There appears to be mild chronic vertebral body height loss at the T11 and T12 levels with some spondylosis noted at these levels.  The disc space heights appear to be relatively well maintained.  Mild spondylosis noted anteriorly at the L3-4 and to a lesser degree the L4-5 levels.  No pars defects are noted.       Past Medical History:   Diagnosis Date    Diabetes mellitus, type 2     Hyperlipidemia     Testosterone deficiency     LOW Testosterone     Past Surgical History:   Procedure Laterality Date    EPIDURAL STEROID INJECTION N/A 2/2/2023    Procedure: Lumbar L5/S1 IL JONATAN with right paramedian approach RN IV Sedation;  Surgeon: Nico Angeles MD;  Location: [facility];  Service: Pain Management;  Laterality: N/A;    SELECTIVE INJECTION OF ANESTHETIC AGENT AROUND LUMBAR SPINAL NERVE ROOT BY TRANSFORAMINAL APPROACH Bilateral 4/4/2023    Procedure: Bilateral L5/S1 TF JONATAN;  Surgeon: Nico Angeles MD;  Location: [facility];  Service: Pain Management;  Laterality: Bilateral;     SELECTIVE INJECTION OF ANESTHETIC AGENT AROUND LUMBAR SPINAL NERVE ROOT BY TRANSFORAMINAL APPROACH Bilateral 12/7/2023    Procedure: Bilateral L5/S1 TF JONATAN;  Surgeon: Nico Angeles MD;  Location: [facility];  Service: Pain Management;  Laterality: Bilateral;    SELECTIVE INJECTION OF ANESTHETIC AGENT AROUND LUMBAR SPINAL NERVE ROOT BY TRANSFORAMINAL APPROACH Bilateral 3/21/2024    Procedure: Bilateral L5/S1 TF JONATAN;  Surgeon: Nico Angeles MD;  Location: [facility];  Service: Pain Management;  Laterality: Bilateral;    SELECTIVE INJECTION OF ANESTHETIC AGENT AROUND LUMBAR SPINAL NERVE ROOT BY TRANSFORAMINAL APPROACH Bilateral 4/24/2025    Procedure: Bilateral Lumbar L5/S1 TF JONATAN;  Surgeon: Nico Angeles MD;  Location: [facility];  Service: Pain Management;  Laterality: Bilateral;     Social History     Socioeconomic History    Marital status:    Tobacco Use    Smoking status: Some Days     Types: Vaping with nicotine    Smokeless tobacco: Never   Substance and Sexual Activity    Alcohol use: Yes     Alcohol/week: 1.0 standard drink of alcohol     Types: 1 Shots of liquor per week     Comment: once a month    Drug use: Never    Sexual activity: Yes     Partners: Male     Family History   Problem Relation Name Age of Onset    Diabetes type II Mother      Diabetes type II Father      Hypertension Father      Diabetes type II Brother      Cancer Maternal Grandmother      Diabetes Maternal Grandfather      No Known Problems Paternal Grandmother      Heart attack Paternal Grandfather         Review of patient's allergies indicates:  No Known Allergies      Current Outpatient Medications   Medication Sig    celecoxib (CELEBREX) 200 MG capsule TAKE 1 CAPSULE(200 MG) BY MOUTH TWICE DAILY    chlorzoxazone (PARAFON FORTE) 500 mg Tab TAKE 1 TABLET(500 MG) BY MOUTH TWICE DAILY AS NEEDED FOR MUSCLE SPASMS. MAY CAUSE DROWSINESS    gabapentin (NEURONTIN) 600 MG tablet Take 2 tablets (1,200 mg total) by  "mouth 2 (two) times daily.    HYDROcodone-acetaminophen (NORCO) 5-325 mg per tablet Take 1 tablet by mouth every 12 (twelve) hours as needed for Pain.    HYDROcodone-acetaminophen (NORCO) 7.5-325 mg per tablet Take 1 tablet by mouth every 12 (twelve) hours as needed for Pain.    metFORMIN (GLUCOPHAGE-XR) 750 MG ER 24hr tablet TAKE 2 TABLETS BY MOUTH EVERY DAY WITH BREAKFAST AND 1 TABLET BY MOUTH EVERY EVENING    rosuvastatin (CRESTOR) 20 MG tablet Take 1 tablet (20 mg total) by mouth once daily.    testosterone cypionate (DEPOTESTOTERONE CYPIONATE) 200 mg/mL injection Inject 1 mL (200 mg total) into the muscle every 7 days.    tirzepatide (MOUNJARO) 10 mg/0.5 mL PnIj Inject 10 mg into the skin every 7 days.     No current facility-administered medications for this visit.       Review of Systems     GENERAL:  No weight loss, malaise or fevers.  HEENT:   No recent changes in vision or hearing  NECK:  Negative for lumps, no difficulty with swallowing.  RESPIRATORY:  Negative for cough, wheezing or shortness of breath, patient denies any recent URI.  CARDIOVASCULAR:  Negative for chest pain, leg swelling or palpitations.  GI:  Negative for abdominal discomfort, blood in stools or black stools or change in bowel habits.  MUSCULOSKELETAL:  See HPI.  SKIN:  Negative for lesions, rash, and itching.  PSYCH:  No mood disorder or recent psychosocial stressors.  Patients sleep is not disturbed secondary to pain.  HEMATOLOGY/LYMPHOLOGY:  Negative for prolonged bleeding, bruising easily or swollen nodes.  Patient is not currently taking any anti-coagulants  NEURO:   No history of headaches, syncope, paralysis, seizures or tremors.  All other reviewed and negative other than HPI.    OBJECTIVE:    Vitals:    05/27/25 0818   BP: 115/77   Pulse: 86   Weight: 112.2 kg (247 lb 7.5 oz)   Height: 5' 11" (1.803 m)     Body mass index is 34.51 kg/m².       Physical Exam    GENERAL: Well appearing, in no acute distress, alert and oriented " x3.  PSYCH:  Mood and affect appropriate.  SKIN: Skin color, texture, turgor normal, no rashes or lesions.  HEAD/FACE:  Normocephalic, atraumatic. Cranial nerves grossly intact.  PULM: No evidence of respiratory difficulty, symmetric chest rise.  GI:  Soft and non-tender, negative heredia's sign, negative Mcburney's sign, negative CVA tenderness  BACK: Straight leg raising in the sitting and supine positions is positive to radicular pain on the left.   Moderate pain to palpation over the facet joints of the lumbar spine and lumbar paraspinals, mostly on the left.   Limited range of motion secondary to pain reproduction. Flexion relieves the pain.  EXTREMITIES: No deformities, edema, or skin discoloration. Good capillary refill.  MUSCULOSKELETAL:  Hip and knee provocative maneuvers are negative.  There is pain with palpation over the sacroiliac joint on the left side. TTP over left piriformis. Moderated tenderness along right quadratus lumborum. FABERs test is negative.  FADIRs test is negative.   Bilateral upper and lower extremity strength is normal and symmetric with the exception of left-sided footdrop at 3/5, 4/5 inversion/eversion of left foot.  No atrophy or tone abnormalities are noted.    GAIT:  slow, antalgic, gait.       LABS:  Lab Results   Component Value Date    WBC 6.69 03/26/2025    HGB 16.0 03/26/2025    HCT 48.7 03/26/2025    MCV 86 03/26/2025     03/26/2025       CMP  Sodium   Date Value Ref Range Status   03/26/2025 138 136 - 145 mmol/L Final   09/12/2024 139 136 - 145 mmol/L Final     Potassium   Date Value Ref Range Status   03/26/2025 4.3 3.5 - 5.1 mmol/L Final   09/12/2024 4.7 3.5 - 5.1 mmol/L Final     Chloride   Date Value Ref Range Status   03/26/2025 103 95 - 110 mmol/L Final   09/12/2024 103 95 - 110 mmol/L Final     CO2   Date Value Ref Range Status   03/26/2025 26 23 - 29 mmol/L Final   09/12/2024 29 23 - 29 mmol/L Final     Glucose   Date Value Ref Range Status   03/26/2025 151  (H) 70 - 110 mg/dL Final   09/12/2024 125 (H) 70 - 110 mg/dL Final     BUN   Date Value Ref Range Status   03/26/2025 10 6 - 20 mg/dL Final     Creatinine   Date Value Ref Range Status   03/26/2025 1.0 0.5 - 1.4 mg/dL Final     Calcium   Date Value Ref Range Status   03/26/2025 8.7 8.7 - 10.5 mg/dL Final   09/12/2024 9.4 8.7 - 10.5 mg/dL Final     Protein Total   Date Value Ref Range Status   03/26/2025 6.9 6.0 - 8.4 gm/dL Final     Total Protein   Date Value Ref Range Status   09/12/2024 6.8 6.0 - 8.4 g/dL Final     Albumin   Date Value Ref Range Status   03/26/2025 4.1 3.5 - 5.2 g/dL Final   09/12/2024 4.3 3.5 - 5.2 g/dL Final   11/04/2022 4.8 3.6 - 5.1 g/dL Final     Total Bilirubin   Date Value Ref Range Status   09/12/2024 0.4 0.1 - 1.0 mg/dL Final     Comment:     For infants and newborns, interpretation of results should be based  on gestational age, weight and in agreement with clinical  observations.    Premature Infant recommended reference ranges:  Up to 24 hours.............<8.0 mg/dL  Up to 48 hours............<12.0 mg/dL  3-5 days..................<15.0 mg/dL  6-29 days.................<15.0 mg/dL       Bilirubin Total   Date Value Ref Range Status   03/26/2025 0.3 0.1 - 1.0 mg/dL Final     Comment:     For infants and newborns, interpretation of results should be based   on gestational age, weight and in agreement with clinical   observations.    Premature Infant recommended reference ranges:   0-24 hours:  <8.0 mg/dL   24-48 hours: <12.0 mg/dL   3-5 days:    <15.0 mg/dL   6-29 days:   <15.0 mg/dL     Alkaline Phosphatase   Date Value Ref Range Status   09/12/2024 55 55 - 135 U/L Final     ALP   Date Value Ref Range Status   03/26/2025 47 40 - 150 unit/L Final     AST   Date Value Ref Range Status   03/26/2025 45 11 - 45 unit/L Final   09/12/2024 29 10 - 40 U/L Final     ALT   Date Value Ref Range Status   03/26/2025 47 (H) 10 - 44 unit/L Final   09/12/2024 43 10 - 44 U/L Final     Anion Gap   Date  Value Ref Range Status   03/26/2025 9 8 - 16 mmol/L Final     eGFR if    Date Value Ref Range Status   06/06/2022 >60.0 >60 mL/min/1.73 m^2 Final     eGFR if non    Date Value Ref Range Status   06/06/2022 >60.0 >60 mL/min/1.73 m^2 Final     Comment:     Calculation used to obtain the estimated glomerular filtration  rate (eGFR) is the CKD-EPI equation.          Lab Results   Component Value Date    HGBA1C 6.8 (H) 03/26/2025     ASSESSMENT: 49 y.o. year old male with chronic lower back pain, consistent with     1. Neural foraminal stenosis of lumbar spine  predniSONE (DELTASONE) 5 MG tablet      2. Lumbar radiculopathy  predniSONE (DELTASONE) 5 MG tablet      3. Degeneration of intervertebral disc of lumbar region with discogenic back pain and lower extremity pain              PLAN:   - Interventions:  None at this time.     S/p Bilateral Lumbar L5/S1 TFESI on 4/24/25  with 100% for 2-3 days   S/p repeat bilateral L5-S1 TFESI on 03/21/2024, 75% relief  s/p bilateral L5/S1 TF JONATAN on 12/07/2023 with 70% relief overall  -s/p bilateral L5/S1 TF JONATAN on 4/4/23 with 95% relief x 1 week, then 20% relief  s/p L5/S1 IL JONATAN with 80% relief with the initial 2 weeks.      - Anticoagulation use: no       - Medications: I have stressed the importance of physical activity and a home exercise plan to help with pain and improve health. and Patient can continue with medications for now since they are providing benefits, using them appropriately, and without side effects.     An opioid pain contract was completed on 06/12/2024 after having an extensive conversation about chronic opioid use for pain management. We discussed the risks and benefits of opioids.  I discouraged the patient from escalation of opioid use and try to minimize its use to decrease chance of dependence, tolerance, and opioid-based hyperalgesia.  I reviewed the  in great detail and there are no inconsistencies and it is appropriate  "with patient's history.  There are no signs of aberrant drug behavior.  The opioid risk tool was also completed.  Pt counseled about the side effects of long term use of opioids including dependence, tolerance, addiction, respiratory depression, somnelence , immune and endocrine dysfunction.  The patient expressed understanding.    - start Prednisone 5 mg taper.     --Continue Norco  7.5/325 mg Q 12 p.r.n., 60 tablets. Last filled 5/12/2025.  -Continue Gabapentin 600 mg BID. Previously tried Lyrica- unable to tolerate "interfered with patient mentally."    -Continue Celebrex 200 mg BID for day time pain/inflammation, per PCP. Patient states this helps tremendously with his hand discomfort.    - Continue Parafon Forte: Take 1 tablet (500 mg total) by mouth 2 (two) times daily as needed (muscle spasms). May cause drowsiness.,    (Previously Rx'd Flexeril- not effective)       report:  Reviewed and consistent with medication use as prescribed.      - Therapy:  Advised patient continue with activities as tolerated    - Imaging: Reviewed available imaging with patient and answered any questions they had regarding study.  Reviewed updated MRI lumbar spine, x-rays of hip/pelvis.    - Consults/Referrals:   - Previously referred to Dr. Still for consideration of medical marijuana for chronic pain treatment - if patient chooses to explore this treatment option.   - Referral to Neurosurgery for further eval/tx recommendations. Patient has been scheduled for 7/3/25.      - Records:  Reviewed/Obtain old records from outside physicians and imaging    - Follow up visit:  can follow up after seeing NSGY or when needed.    - Counseled patient regarding the importance of activity modification and physical therapy    - This condition does not require this patient to take time off of work, and the primary goal of our Pain Management services is to improve the patient's functional capacity.    - Patient Questions: Answered all of " the patient's questions regarding diagnosis, therapy, and treatment        The above plan and management options were discussed at length with patient. Patient is in agreement with the above and verbalized understanding.    Visit today included increased complexity associated with the care of the episodic problem of chronic pain which was addressed and continue to manage the longitudinal care of the patient due to the serious and/or complex managed problem(s) listed above.    Urine Drug Screen- 9/26/2024: Compliant with Medication    Basil Carpio PA-C  Interventional Pain Management  [facility]

## 2025-05-27 NOTE — LETTER
AUTHORIZATION FOR RELEASE OF   CONFIDENTIAL INFORMATION    Dear [facility],    We are seeing Kendrick Nava, date of birth 1975, in the clinic at [facility]. Genevieve Mclaughlin MD is the patient's PCP. Kendrick Nava has an outstanding lab/procedure at the time we reviewed his chart. In order to help keep his health information updated, he has authorized us to request the following medical record(s):        (  )  MAMMOGRAM                                      (  )  COLONOSCOPY      (  )  PAP SMEAR                                          (  )  OUTSIDE LAB RESULTS     (  )  DEXA SCAN                                          ( X)  DIABETIC EYE EXAM            (  )  FOOT EXAM                                          (  )  ENTIRE RECORD     (  )  OUTSIDE IMMUNIZATIONS                 (  )  _______________         Please fax records to [facility], [facility] Care Coordination @ [phone].       If you have any questions, please contact [facility]  [phone]            Patient Name: Kendrick Nava  : 1975  Patient Phone #: [phone]

## 2025-05-27 NOTE — PROGRESS NOTES
Working [facility] eye exams    CAROL to [facility], [facility] for DM EYE EXAM   Reminder set x 2 weeks.

## 2025-06-04 ENCOUNTER — PATIENT MESSAGE (OUTPATIENT)
Dept: PAIN MEDICINE | Facility: CLINIC | Age: 50
End: 2025-06-04
Payer: COMMERCIAL

## 2025-06-04 DIAGNOSIS — M48.07 SPINAL STENOSIS, LUMBOSACRAL REGION: ICD-10-CM

## 2025-06-04 DIAGNOSIS — M54.16 LUMBAR RADICULOPATHY: ICD-10-CM

## 2025-06-04 DIAGNOSIS — M47.816 LUMBAR SPONDYLOSIS: ICD-10-CM

## 2025-06-04 DIAGNOSIS — M54.9 DORSALGIA, UNSPECIFIED: ICD-10-CM

## 2025-06-05 ENCOUNTER — PATIENT OUTREACH (OUTPATIENT)
Dept: ADMINISTRATIVE | Facility: HOSPITAL | Age: 50
End: 2025-06-05
Payer: COMMERCIAL

## 2025-06-06 DIAGNOSIS — E11.9 TYPE 2 DIABETES MELLITUS WITHOUT COMPLICATION, WITHOUT LONG-TERM CURRENT USE OF INSULIN: ICD-10-CM

## 2025-06-06 NOTE — TELEPHONE ENCOUNTER
No care due was identified.  [facility] Embedded Care Due Messages. Reference number: [id number].   6/06/2025 6:21:37 PM CDT

## 2025-06-07 NOTE — TELEPHONE ENCOUNTER
No care due was identified.  [facility] Embedded Care Due Messages. Reference number: [id number].   6/07/2025 10:02:33 AM CDT

## 2025-06-07 NOTE — TELEPHONE ENCOUNTER
Refill Routing Note   Medication(s) are not appropriate for processing by [facility] for the following reason(s):        New or recently adjusted medication    [facility] action(s):  Defer             Appointments  past 12m or future 3m with PCP    Date Provider   Last Visit   4/2/2025 Genevieve Mclaughlin MD   Next Visit   10/2/2025 Genevieve Mclaughlin MD   ED visits in past 90 days: 0        Note composed:2:03 PM 06/07/2025

## 2025-06-08 RX ORDER — TIRZEPATIDE 10 MG/.5ML
INJECTION, SOLUTION SUBCUTANEOUS
Qty: 4 ML | Refills: 0 | Status: SHIPPED | OUTPATIENT
Start: 2025-06-08

## 2025-06-09 RX ORDER — HYDROCODONE BITARTRATE AND ACETAMINOPHEN 7.5; 325 MG/1; MG/1
1 TABLET ORAL EVERY 12 HOURS PRN
Qty: 60 TABLET | Refills: 0 | Status: SHIPPED | OUTPATIENT
Start: 2025-06-09 | End: 2025-07-09

## 2025-06-29 NOTE — TELEPHONE ENCOUNTER
Care Due:                  Date            Visit Type   Department     Provider  --------------------------------------------------------------------------------                                EP -                              PRIMARY      CEN INTERNAL  Last Visit: 04-      CARE ([facility])   PAMELA Mclaughlin                              EP -                              PRIMARY      [facility] INTERNAL  Next Visit: 10-      CARE ([facility])   PAMELA Mclaughlin                                                            Last  Test          Frequency    Reason                     Performed    Due Date  --------------------------------------------------------------------------------    HBA1C.......  6 months...  metFORMIN................  03- 09-    [facility] Embedded Care Due Messages. Reference number: [id number].   6/29/2025 5:40:12 PM CDT

## 2025-06-30 RX ORDER — CELECOXIB 200 MG/1
200 CAPSULE ORAL 2 TIMES DAILY
Qty: 60 CAPSULE | Refills: 5 | Status: SHIPPED | OUTPATIENT
Start: 2025-06-30

## 2025-07-03 ENCOUNTER — OFFICE VISIT (OUTPATIENT)
Dept: NEUROSURGERY | Facility: CLINIC | Age: 50
End: 2025-07-03
Payer: COMMERCIAL

## 2025-07-03 VITALS — BODY MASS INDEX: 34.84 KG/M2 | WEIGHT: 248.88 LBS | HEIGHT: 71 IN

## 2025-07-03 DIAGNOSIS — F17.200 NEEDS SMOKING CESSATION EDUCATION: ICD-10-CM

## 2025-07-03 DIAGNOSIS — M54.9 DORSALGIA, UNSPECIFIED: ICD-10-CM

## 2025-07-03 DIAGNOSIS — M48.07 SPINAL STENOSIS, LUMBOSACRAL REGION: ICD-10-CM

## 2025-07-03 DIAGNOSIS — M54.16 LUMBAR RADICULOPATHY: ICD-10-CM

## 2025-07-03 PROCEDURE — 99205 OFFICE O/P NEW HI 60 MIN: CPT | Mod: S$GLB,,, | Performed by: NEUROLOGICAL SURGERY

## 2025-07-03 PROCEDURE — G2211 COMPLEX E/M VISIT ADD ON: HCPCS | Mod: S$GLB,,, | Performed by: NEUROLOGICAL SURGERY

## 2025-07-03 PROCEDURE — 1159F MED LIST DOCD IN RCRD: CPT | Mod: CPTII,S$GLB,, | Performed by: NEUROLOGICAL SURGERY

## 2025-07-03 PROCEDURE — 1160F RVW MEDS BY RX/DR IN RCRD: CPT | Mod: CPTII,S$GLB,, | Performed by: NEUROLOGICAL SURGERY

## 2025-07-03 PROCEDURE — 99999 PR PBB SHADOW E&M-EST. PATIENT-LVL III: CPT | Mod: PBBFAC,,, | Performed by: NEUROLOGICAL SURGERY

## 2025-07-03 PROCEDURE — 3008F BODY MASS INDEX DOCD: CPT | Mod: CPTII,S$GLB,, | Performed by: NEUROLOGICAL SURGERY

## 2025-07-03 PROCEDURE — 3044F HG A1C LEVEL LT 7.0%: CPT | Mod: CPTII,S$GLB,, | Performed by: NEUROLOGICAL SURGERY

## 2025-07-03 NOTE — PROGRESS NOTES
Subjective:      Patient ID: Kendrick Nava is a 49 y.o. male.    Chief Complaint: NEW CONSULT    This is a nice 48 yo gentleman here for an evaluation of issues that began in 11/23. He fell out of a three in 2018 and things started getting worse after that time. He had back pain at that time. He was seeing Dr. Angeles for pain management. In 11/23 he went to the ED because of severe worsening of pain in the left leg. With rest those symptoms resolved with rest. The pain in the left leg came back with initially just back pain in April/May. He had an injection in April 2025 and the left leg pain got worse following the procedure.  Since that worsening of pain things have been improving slowly,    Currently he is having pain in the left lower back, buttock thigh and a static like electrical pain in the anterior and medial calf. The pain is starting to calm down a bit, not quite as bad, but still a problem.       Review of Systems   Musculoskeletal:  Positive for back pain and gait problem.   Neurological:  Positive for weakness and numbness.      Objective:     Physical Exam:    Constitutional: He appears well-developed and well-nourished. He is not diaphoretic. No distress.     Eyes: Pupils are equal, round, and reactive to light. Conjunctivae and EOM are normal. Right eye exhibits no discharge. Left eye exhibits no discharge.   Fundoscopic exam:       The right eye shows no papilledema and no hemorrhage.        The left eye shows no papilledema and no hemorrhage.   No scleral icterus.     Cardiovascular: Normal rate and regular rhythm.     Abdominal: Soft.     Skin: Skin displays no rash on trunk and no rash on extremities. Skin displays no lesions on trunk and no lesions on extremities.     Psych/Behavior: He is alert. He is oriented to person, place, and time. He has a normal mood and affect.     Musculoskeletal: Gait is abnormal.        Neck: Range of motion is full. Muscle strength is 5/5.        Back: Range of  motion is limited. ROM severity is Limited bending, flexion and extension. Muscle strength is 4/5. Tone is abnormal.        Right Upper Extremities: Range of motion is full. Tone is normal.        Left Upper Extremities: Range of motion is full. Tone is normal.       Right Lower Extremities: Range of motion is full. There is no tenderness. Muscle strength is 4/5. Tone is normal.        Left Lower Extremities: Range of motion is limited. There is no tenderness. Muscle strength is 4/5.      Comments: Positive straight leg raise on the left     Neurological:        Sensory: There is (Paresthesia left thigh anterior/medial shin) sensory deficit in the extremities.        DTRs: DTRs are DTRS NORMAL AND SYMMETRICnormal and symmetric.        Cranial nerves: Cranial nerve(s) II, III, IV, V, VI, VII, VIII, IX, X, XI and XII are intact.     Assessment:     1. Dorsalgia, unspecified    2. Spinal stenosis, lumbosacral region    3. Lumbar radiculopathy       Plan:     Dorsalgia, unspecified  -     Ambulatory referral/consult to Neurosurgery    Spinal stenosis, lumbosacral region  -     Ambulatory referral/consult to Neurosurgery    Lumbar radiculopathy  -     Ambulatory referral/consult to Neurosurgery     This is a nice 49-year-old gentleman here for an initial evaluation of back pain and really left much greater than right radiculopathy.  He has had issues since 2018 with a significant flare-up in 2023 and then another flare up earlier this year.  In 2023 his symptoms resolved with some rest during did really well for a while.  He had worsening pain this year which was initially back pain that then progressed to more pain in the left leg.  He had a bilateral transforaminal injection at L5-S1 and since the injection he has had more pain in the left leg with some weakness dorsiflexion, which she had before.  These symptoms are improving but have not improved all the way to normal.  He still has a lot of trouble walking and  getting up and about.  We reviewed his MRI compared to the MRI from 2023 discussed the findings.  He has got a paramedian to lateral disc herniation at L4-5 causing some subarticular and some foraminal narrowing over on that left side that I think is the main culprit.  We discussed treatment options including observation, a repeated injection versus surgery.  Ideally he would like to avoid surgery if possible.  We can try a left L4-5, L5-S1 transforaminal injection to see if this helps improve his symptoms and get him back to a more functional level.  If this does not help I think he would be a candidate for a left L4-5 diskectomy procedure.  I will get the injection ordered and then I can see him back to see how that goes and we can make further decisions at that time.

## 2025-07-05 DIAGNOSIS — M54.16 LUMBAR RADICULOPATHY: ICD-10-CM

## 2025-07-05 DIAGNOSIS — M47.816 LUMBAR SPONDYLOSIS: ICD-10-CM

## 2025-07-05 DIAGNOSIS — M54.9 DORSALGIA, UNSPECIFIED: ICD-10-CM

## 2025-07-07 RX ORDER — CHLORZOXAZONE 500 MG/1
TABLET ORAL
Qty: 60 TABLET | Refills: 1 | Status: SHIPPED | OUTPATIENT
Start: 2025-07-07

## 2025-07-11 DIAGNOSIS — M54.16 LUMBAR RADICULOPATHY: Primary | ICD-10-CM

## 2025-07-11 DIAGNOSIS — M48.07 SPINAL STENOSIS, LUMBOSACRAL REGION: ICD-10-CM

## 2025-07-11 DIAGNOSIS — M54.16 LUMBAR RADICULOPATHY: ICD-10-CM

## 2025-07-11 DIAGNOSIS — M54.9 DORSALGIA, UNSPECIFIED: ICD-10-CM

## 2025-07-11 DIAGNOSIS — M47.816 LUMBAR SPONDYLOSIS: ICD-10-CM

## 2025-07-14 ENCOUNTER — PATIENT MESSAGE (OUTPATIENT)
Dept: PAIN MEDICINE | Facility: CLINIC | Age: 50
End: 2025-07-14
Payer: COMMERCIAL

## 2025-07-14 RX ORDER — HYDROCODONE BITARTRATE AND ACETAMINOPHEN 7.5; 325 MG/1; MG/1
1 TABLET ORAL EVERY 12 HOURS PRN
Qty: 60 TABLET | Refills: 0 | Status: SHIPPED | OUTPATIENT
Start: 2025-07-14 | End: 2025-08-13

## 2025-07-14 NOTE — TELEPHONE ENCOUNTER
Good Morning/Afternoon,     Pt is requesting for a refill of: HYDROcodone-acetaminophen (NORCO) 5-325 mg per tablet   Last filed: 04/14/2025  Last encounter: 05/27/2025  Up coming apt: can follow up after seeing NSGY or when needed   [id number] - [place], [place] - [address] [facility] [address]     Medical Assistant  Suzan AIKEN (CCMA, Interventional Pain Management Surgery Scheduler)

## 2025-07-16 DIAGNOSIS — E11.9 TYPE 2 DIABETES MELLITUS WITHOUT COMPLICATION: ICD-10-CM

## 2025-07-17 NOTE — PRE-PROCEDURE INSTRUCTIONS
Spoke with patient regarding procedure scheduled on 7.31     Arrival time 0730     Has patient been sick with fever or on antibiotics within the last 7 days? No     Does the patient have any open wounds, sores or rashes? No     Does the patient have any recent fractures? no     Has patient received a vaccination within the last 7 days? No     Received the COVID vaccination? yes     Has the patient stopped all medications as directed? na     Does patient have a pacemaker, defibrillator, or implantable stimulator? No     Does the patient have a ride to and from procedure and someone reliable to remain with patient?  wife     Is the patient diabetic? yes      Does the patient have sleep apnea? Or use O2 at home? no     Is the patient receiving sedation? Yes      Is the patient instructed to remain NPO beginning at midnight the night before their procedure? yes     Procedure location confirmed with patient? Yes     Covid- Denies signs/symptoms. Instructed to notify PAT/MD if any changes.

## 2025-07-31 ENCOUNTER — HOSPITAL ENCOUNTER (OUTPATIENT)
Facility: HOSPITAL | Age: 50
Discharge: HOME | End: 2025-07-31
Attending: PHYSICAL MEDICINE & REHABILITATION | Admitting: PHYSICAL MEDICINE & REHABILITATION
Payer: COMMERCIAL

## 2025-07-31 DIAGNOSIS — G89.4 CHRONIC PAIN SYNDROME: ICD-10-CM

## 2025-07-31 DIAGNOSIS — M54.16 LUMBAR RADICULOPATHY: Primary | ICD-10-CM

## 2025-07-31 LAB — POCT GLUCOSE: 147 MG/DL (ref 70–110)

## 2025-07-31 PROCEDURE — 64484 NJX AA&/STRD TFRM EPI L/S EA: CPT | Mod: LT,,, | Performed by: PHYSICAL MEDICINE & REHABILITATION

## 2025-07-31 PROCEDURE — 64483 NJX AA&/STRD TFRM EPI L/S 1: CPT | Mod: LT | Performed by: PHYSICAL MEDICINE & REHABILITATION

## 2025-07-31 PROCEDURE — 25500020 PHARM REV CODE 255: Performed by: PHYSICAL MEDICINE & REHABILITATION

## 2025-07-31 PROCEDURE — 64483 NJX AA&/STRD TFRM EPI L/S 1: CPT | Mod: LT,,, | Performed by: PHYSICAL MEDICINE & REHABILITATION

## 2025-07-31 PROCEDURE — 64484 NJX AA&/STRD TFRM EPI L/S EA: CPT | Mod: LT | Performed by: PHYSICAL MEDICINE & REHABILITATION

## 2025-07-31 PROCEDURE — 82962 GLUCOSE BLOOD TEST: CPT | Performed by: PHYSICAL MEDICINE & REHABILITATION

## 2025-07-31 PROCEDURE — 63600175 PHARM REV CODE 636 W HCPCS: Performed by: PHYSICAL MEDICINE & REHABILITATION

## 2025-07-31 RX ORDER — ONDANSETRON HYDROCHLORIDE 2 MG/ML
4 INJECTION, SOLUTION INTRAVENOUS ONCE AS NEEDED
Status: DISCONTINUED | OUTPATIENT
Start: 2025-07-31 | End: 2025-07-31 | Stop reason: HOSPADM

## 2025-07-31 RX ORDER — MIDAZOLAM HYDROCHLORIDE 1 MG/ML
INJECTION INTRAMUSCULAR; INTRAVENOUS
Status: DISCONTINUED | OUTPATIENT
Start: 2025-07-31 | End: 2025-07-31 | Stop reason: HOSPADM

## 2025-07-31 RX ORDER — BUPIVACAINE HYDROCHLORIDE 2.5 MG/ML
INJECTION, SOLUTION EPIDURAL; INFILTRATION; INTRACAUDAL; PERINEURAL
Status: DISCONTINUED | OUTPATIENT
Start: 2025-07-31 | End: 2025-07-31 | Stop reason: HOSPADM

## 2025-07-31 RX ORDER — METHYLPREDNISOLONE ACETATE 40 MG/ML
INJECTION, SUSPENSION INTRA-ARTICULAR; INTRALESIONAL; INTRAMUSCULAR; SOFT TISSUE
Status: DISCONTINUED | OUTPATIENT
Start: 2025-07-31 | End: 2025-07-31 | Stop reason: HOSPADM

## 2025-07-31 RX ORDER — FENTANYL CITRATE 50 UG/ML
INJECTION, SOLUTION INTRAMUSCULAR; INTRAVENOUS
Status: DISCONTINUED | OUTPATIENT
Start: 2025-07-31 | End: 2025-07-31 | Stop reason: HOSPADM

## 2025-07-31 NOTE — DISCHARGE INSTRUCTIONS

## 2025-07-31 NOTE — DISCHARGE SUMMARY
Discharge Note  Short Stay      SUMMARY     Admit Date: 7/31/2025    Attending Physician: Nico Angeels MD        Discharge Physician: Nico Angeles MD        Discharge Date: 7/31/2025 8:53 AM    Procedure(s) (LRB):  Left L4-5 and L5-S1 TFESI (Left)    Final Diagnosis: Lumbar radiculopathy [M54.16]    Disposition: Home or self care    Patient Instructions:   Current Discharge Medication List        CONTINUE these medications which have NOT CHANGED    Details   gabapentin (NEURONTIN) 600 MG tablet Take 2 tablets (1,200 mg total) by mouth 2 (two) times daily.  Qty: 120 tablet, Refills: 11    Associated Diagnoses: Numbness and tingling of both upper extremities; Lumbar radiculopathy      metFORMIN (GLUCOPHAGE-XR) 750 MG ER 24hr tablet TAKE 2 TABLETS BY MOUTH EVERY DAY WITH BREAKFAST AND 1 TABLET BY MOUTH EVERY EVENING  Qty: 270 tablet, Refills: 1      MOUNJARO 10 mg/0.5 mL PnIj ADMINISTER 10 MG UNDER THE SKIN EVERY 7 DAYS  Qty: 4 mL, Refills: 0    Associated Diagnoses: Type 2 diabetes mellitus without complication, without long-term current use of insulin      rosuvastatin (CRESTOR) 20 MG tablet Take 1 tablet (20 mg total) by mouth once daily.  Qty: 90 tablet, Refills: 3      celecoxib (CELEBREX) 200 MG capsule TAKE 1 CAPSULE(200 MG) BY MOUTH TWICE DAILY  Qty: 60 capsule, Refills: 5      chlorzoxazone (PARAFON FORTE) 500 mg Tab TAKE 1 TABLET(500 MG) BY MOUTH TWICE DAILY AS NEEDED FOR MUSCLE SPASMS. MAY CAUSE DROWSINESS  Qty: 60 tablet, Refills: 1    Associated Diagnoses: Lumbar radiculopathy; Lumbar spondylosis; Dorsalgia, unspecified      HYDROcodone-acetaminophen (NORCO) 5-325 mg per tablet Take 1 tablet by mouth every 12 (twelve) hours as needed for Pain.  Qty: 60 tablet, Refills: 0    Comments: Greater than 7 day supply medically necessary.  Associated Diagnoses: Lumbar radiculopathy; Lumbar spondylosis; DDD (degenerative disc disease), lumbar; Pain management contract agreement       HYDROcodone-acetaminophen (NORCO) 7.5-325 mg per tablet Take 1 tablet by mouth every 12 (twelve) hours as needed for Pain.  Qty: 60 tablet, Refills: 0    Comments: Quantity prescribed more than 7 day supply? Yes, quantity medically necessary  Associated Diagnoses: Dorsalgia, unspecified; Spinal stenosis, lumbosacral region; Lumbar radiculopathy; Lumbar spondylosis      testosterone cypionate (DEPOTESTOTERONE CYPIONATE) 200 mg/mL injection Inject 1 mL (200 mg total) into the muscle every 7 days.  Qty: 5 mL, Refills: 5    Associated Diagnoses: Testosterone deficiency                 Discharge Diagnosis: Lumbar radiculopathy [M54.16]  Condition on Discharge: Stable with no complications to procedure   Diet on Discharge: Same as before.  Activity: as per instruction sheet.  Discharge to: Home with a responsible adult.  Follow up: 2-4 weeks       Please call the office at [phone] if you experience any weakness or loss of sensation, fever > 101.5, pain uncontrolled with oral medications, persistent nausea/vomiting/or diarrhea, redness or drainage from the incisions, or any other worrisome concerns. If physician on call was not reached or could not communicate with our office for any reason please go to the nearest emergency department

## 2025-07-31 NOTE — OP NOTE
INFORMED CONSENT: The procedure, risks, benefits and options were discussed with patient. There are no contraindications to the procedure. The patient expressed understanding and agreed to proceed. The personnel performing the procedure was discussed.    07/31/2025    Surgeon: Nico Angeles MD    Assistants: None    Sedation: Conscious sedation provided by M.D    The patient was monitored with continuous pulse oximetry, EKG, and intermittent blood pressure monitors, immediately prior to administration of sedation.  The patient was hemodynamically stable throughout the entire process was responsive to voice, and breathing spontaneously.  Supplemental O2 was provided at 2L/min via nasal cannula.  Patient was comfortable for the duration of the procedure.     There was a total of 2mg IV Midazolam and 75mcg Fentanyl titrated for the procedure    Total sedation time was >10minutes and <20minutes      PROCEDURE:    1)  Left  L4-5 TRANSFORAMINAL EPIDURAL STEROID INJECTION    2)  Left  L5-S1 TRANSFORAMINAL EPIDURAL STEROID INJECTION    Pre Procedure diagnosis:    Left  L4 and L5  Lumbar radiculopathy [M54.16]    Post-Procedure diagnosis:   same    Complications: None    Specimens: None      DESCRIPTION OF PROCEDURE: The patient was brought to the procedure room. IV access was obtained prior to the procedure. The patient was positioned prone on the fluoroscopy table. Continuous hemodynamic monitoring was initiated including blood pressure, EKG, and pulse oximetry. . The skin was prepped with chlorhexidine and draped in a sterile fashion. Skin anesthesia was achieved using a total of 10mL of lidocaine, 5mL over each respective injection site.     The  L4-5 and L5-S1  transforaminal spaces were identified with fluoroscopy in the  AP, oblique, and lateral views.  A 22 gauge spinal quinke needle was then advanced into the area of the trans foraminal spaces at the above levels with confirmation of proper needle position using  AP, oblique, and lateral fluoroscopic views. Once the needle tip was in the area of the transforaminal space, and there was no blood, CSF or paraesthesias,  1 mL of Omnipaque 300mg/ml was injected on each side for a total of 2 mL.  Fluoroscopic imaging in the AP and lateral views revealed a clear outline of the spinal nerve with proximal spread of agent through the neural foramen into the epidural space. A total combination of 1 mL of Bupivicaine 0.25% and 40 mg depo medrol was injected into each level for a total of 4mL of injected medications with displacement of the contrast dye confirming that the medication went into the area of the transforaminal spaces at each level. A sterile dressing was applied.   Patient tolerated the procedure well.    Patient was taken back to the recovery room for further observation.     The patient was discharged to home in stable condition

## 2025-07-31 NOTE — H&P
HPI  Patient presenting for Procedure(s) (LRB):  Left L4-5 and L5-S1 TFESI (Left)         No health changes since previous encounter    Past Medical History:   Diagnosis Date    Diabetes mellitus, type 2     Hyperlipidemia     Testosterone deficiency     LOW Testosterone     Past Surgical History:   Procedure Laterality Date    EPIDURAL STEROID INJECTION N/A 2/2/2023    Procedure: Lumbar L5/S1 IL JONATAN with right paramedian approach RN IV Sedation;  Surgeon: Nico Angeles MD;  Location: [facility];  Service: Pain Management;  Laterality: N/A;    SELECTIVE INJECTION OF ANESTHETIC AGENT AROUND LUMBAR SPINAL NERVE ROOT BY TRANSFORAMINAL APPROACH Bilateral 4/4/2023    Procedure: Bilateral L5/S1 TF JONATAN;  Surgeon: Nico Angeles MD;  Location: [facility];  Service: Pain Management;  Laterality: Bilateral;    SELECTIVE INJECTION OF ANESTHETIC AGENT AROUND LUMBAR SPINAL NERVE ROOT BY TRANSFORAMINAL APPROACH Bilateral 12/7/2023    Procedure: Bilateral L5/S1 TF JONATAN;  Surgeon: Nico Angeles MD;  Location: [facility];  Service: Pain Management;  Laterality: Bilateral;    SELECTIVE INJECTION OF ANESTHETIC AGENT AROUND LUMBAR SPINAL NERVE ROOT BY TRANSFORAMINAL APPROACH Bilateral 3/21/2024    Procedure: Bilateral L5/S1 TF JONATAN;  Surgeon: Nico Angeles MD;  Location: [facility];  Service: Pain Management;  Laterality: Bilateral;    SELECTIVE INJECTION OF ANESTHETIC AGENT AROUND LUMBAR SPINAL NERVE ROOT BY TRANSFORAMINAL APPROACH Bilateral 4/24/2025    Procedure: Bilateral Lumbar L5/S1 TF JONATAN;  Surgeon: Nico Angeles MD;  Location: [facility];  Service: Pain Management;  Laterality: Bilateral;     Review of patient's allergies indicates:  No Known Allergies     Medications Ordered Prior to Encounter[1]     PMHx, PSHx, Allergies, Medications reviewed in epic    ROS negative except pain complaints in HPI    OBJECTIVE:    /69 (BP Location: Right arm, Patient Position: Sitting)   Pulse 75    "Temp 97.8 °F (36.6 °C) (Temporal)   Resp 16   Ht 5' 11" (1.803 m)   Wt 114.4 kg (252 lb 5.1 oz)   SpO2 98%   BMI 35.19 kg/m²     PHYSICAL EXAMINATION:    GENERAL: Well appearing, in no acute distress, alert and oriented x3.  PSYCH:  Mood and affect appropriate.  SKIN: Skin color, texture, turgor normal, no rashes or lesions which will impact the procedure.  CV: RRR with palpation of the radial artery.  PULM: No evidence of respiratory difficulty, symmetric chest rise. Clear to auscultation.  NEURO: Cranial nerves grossly intact.    Plan:    Proceed with procedure as planned Procedure(s) (LRB):  Left L4-5 and L5-S1 TFESI (Left)    Nico Angeles MD  07/31/2025                 [1]   No current facility-administered medications on file prior to encounter.     Current Outpatient Medications on File Prior to Encounter   Medication Sig Dispense Refill    gabapentin (NEURONTIN) 600 MG tablet Take 2 tablets (1,200 mg total) by mouth 2 (two) times daily. 120 tablet 11    metFORMIN (GLUCOPHAGE-XR) 750 MG ER 24hr tablet TAKE 2 TABLETS BY MOUTH EVERY DAY WITH BREAKFAST AND 1 TABLET BY MOUTH EVERY EVENING 270 tablet 1    MOUNJARO 10 mg/0.5 mL PnIj ADMINISTER 10 MG UNDER THE SKIN EVERY 7 DAYS 4 mL 0    rosuvastatin (CRESTOR) 20 MG tablet Take 1 tablet (20 mg total) by mouth once daily. 90 tablet 3    celecoxib (CELEBREX) 200 MG capsule TAKE 1 CAPSULE(200 MG) BY MOUTH TWICE DAILY 60 capsule 5    chlorzoxazone (PARAFON FORTE) 500 mg Tab TAKE 1 TABLET(500 MG) BY MOUTH TWICE DAILY AS NEEDED FOR MUSCLE SPASMS. MAY CAUSE DROWSINESS 60 tablet 1    HYDROcodone-acetaminophen (NORCO) 5-325 mg per tablet Take 1 tablet by mouth every 12 (twelve) hours as needed for Pain. 60 tablet 0    testosterone cypionate (DEPOTESTOTERONE CYPIONATE) 200 mg/mL injection Inject 1 mL (200 mg total) into the muscle every 7 days. 5 mL 5     "

## 2025-08-01 VITALS
OXYGEN SATURATION: 98 % | DIASTOLIC BLOOD PRESSURE: 74 MMHG | HEIGHT: 71 IN | RESPIRATION RATE: 15 BRPM | TEMPERATURE: 98 F | SYSTOLIC BLOOD PRESSURE: 130 MMHG | WEIGHT: 252.31 LBS | BODY MASS INDEX: 35.32 KG/M2 | HEART RATE: 74 BPM

## 2025-08-10 DIAGNOSIS — E11.9 TYPE 2 DIABETES MELLITUS WITHOUT COMPLICATION, WITHOUT LONG-TERM CURRENT USE OF INSULIN: ICD-10-CM

## 2025-08-11 RX ORDER — TIRZEPATIDE 10 MG/.5ML
INJECTION, SOLUTION SUBCUTANEOUS
Qty: 12 PEN | Refills: 0 | Status: SHIPPED | OUTPATIENT
Start: 2025-08-11

## 2025-08-12 ENCOUNTER — PATIENT MESSAGE (OUTPATIENT)
Dept: PAIN MEDICINE | Facility: CLINIC | Age: 50
End: 2025-08-12
Payer: COMMERCIAL

## 2025-08-12 DIAGNOSIS — M54.9 DORSALGIA, UNSPECIFIED: ICD-10-CM

## 2025-08-12 DIAGNOSIS — M47.816 LUMBAR SPONDYLOSIS: ICD-10-CM

## 2025-08-12 DIAGNOSIS — M54.16 LUMBAR RADICULOPATHY: ICD-10-CM

## 2025-08-12 DIAGNOSIS — M48.07 SPINAL STENOSIS, LUMBOSACRAL REGION: ICD-10-CM

## 2025-08-12 RX ORDER — HYDROCODONE BITARTRATE AND ACETAMINOPHEN 7.5; 325 MG/1; MG/1
1 TABLET ORAL EVERY 12 HOURS PRN
Qty: 60 TABLET | Refills: 0 | Status: SHIPPED | OUTPATIENT
Start: 2025-08-12 | End: 2025-09-11

## 2025-08-21 DIAGNOSIS — M54.50 CHRONIC BILATERAL LOW BACK PAIN WITHOUT SCIATICA: ICD-10-CM

## 2025-08-21 DIAGNOSIS — G89.29 CHRONIC BILATERAL LOW BACK PAIN WITHOUT SCIATICA: ICD-10-CM

## 2025-08-21 DIAGNOSIS — M54.16 LUMBAR RADICULOPATHY: ICD-10-CM

## 2025-08-21 RX ORDER — CYCLOBENZAPRINE HCL 10 MG
10 TABLET ORAL NIGHTLY
Qty: 90 TABLET | Refills: 3 | Status: SHIPPED | OUTPATIENT
Start: 2025-08-21

## 2025-08-21 RX ORDER — ROSUVASTATIN CALCIUM 20 MG/1
20 TABLET, COATED ORAL
Qty: 90 TABLET | Refills: 2 | Status: SHIPPED | OUTPATIENT
Start: 2025-08-21

## 2025-08-21 RX ORDER — CYCLOBENZAPRINE HCL 10 MG
10 TABLET ORAL 3 TIMES DAILY PRN
COMMUNITY

## 2025-08-29 ENCOUNTER — OFFICE VISIT (OUTPATIENT)
Dept: PAIN MEDICINE | Facility: CLINIC | Age: 50
End: 2025-08-29
Payer: COMMERCIAL

## 2025-08-29 VITALS — BODY MASS INDEX: 35.31 KG/M2 | HEIGHT: 71 IN | RESPIRATION RATE: 17 BRPM | WEIGHT: 252.19 LBS

## 2025-08-29 DIAGNOSIS — M51.362 DEGENERATION OF INTERVERTEBRAL DISC OF LUMBAR REGION WITH DISCOGENIC BACK PAIN AND LOWER EXTREMITY PAIN: ICD-10-CM

## 2025-08-29 DIAGNOSIS — M54.16 LUMBAR RADICULOPATHY: ICD-10-CM

## 2025-08-29 DIAGNOSIS — M48.061 NEURAL FORAMINAL STENOSIS OF LUMBAR SPINE: Primary | ICD-10-CM

## 2025-08-29 RX ORDER — PREDNISONE 10 MG/1
TABLET ORAL 2 TIMES DAILY PRN
Qty: 14 TABLET | Refills: 0 | Status: SHIPPED | OUTPATIENT
Start: 2025-08-29

## 2025-09-02 ENCOUNTER — TELEPHONE (OUTPATIENT)
Dept: PAIN MEDICINE | Facility: CLINIC | Age: 50
End: 2025-09-02
Payer: COMMERCIAL